# Patient Record
Sex: FEMALE | Race: WHITE | Employment: OTHER | URBAN - METROPOLITAN AREA
[De-identification: names, ages, dates, MRNs, and addresses within clinical notes are randomized per-mention and may not be internally consistent; named-entity substitution may affect disease eponyms.]

---

## 2017-08-15 ENCOUNTER — APPOINTMENT (OUTPATIENT)
Dept: CT IMAGING | Age: 82
End: 2017-08-15
Attending: EMERGENCY MEDICINE
Payer: MEDICARE

## 2017-08-15 ENCOUNTER — HOSPITAL ENCOUNTER (EMERGENCY)
Age: 82
Discharge: HOME | End: 2017-08-15
Attending: EMERGENCY MEDICINE
Payer: MEDICARE

## 2017-08-15 VITALS
RESPIRATION RATE: 16 BRPM | WEIGHT: 144 LBS | DIASTOLIC BLOOD PRESSURE: 61 MMHG | BODY MASS INDEX: 26.5 KG/M2 | HEIGHT: 62 IN | SYSTOLIC BLOOD PRESSURE: 126 MMHG | HEART RATE: 68 BPM | OXYGEN SATURATION: 94 % | TEMPERATURE: 98.9 F

## 2017-08-15 DIAGNOSIS — N30.00 ACUTE CYSTITIS WITHOUT HEMATURIA: Primary | ICD-10-CM

## 2017-08-15 LAB
ALBUMIN SERPL BCP-MCNC: 3.3 G/DL (ref 3.5–5)
ALBUMIN/GLOB SERPL: 0.7 {RATIO} (ref 1.1–2.2)
ALP SERPL-CCNC: 90 U/L (ref 45–117)
ALT SERPL-CCNC: 18 U/L (ref 12–78)
ANION GAP BLD CALC-SCNC: 7 MMOL/L (ref 5–15)
APPEARANCE UR: ABNORMAL
AST SERPL W P-5'-P-CCNC: 16 U/L (ref 15–37)
ATRIAL RATE: 63 BPM
BACTERIA URNS QL MICRO: ABNORMAL /HPF
BASOPHILS # BLD AUTO: 0.1 K/UL (ref 0–0.1)
BASOPHILS # BLD: 1 % (ref 0–1)
BILIRUB SERPL-MCNC: 0.3 MG/DL (ref 0.2–1)
BILIRUB UR QL: NEGATIVE
BUN SERPL-MCNC: 14 MG/DL (ref 6–20)
BUN/CREAT SERPL: 16 (ref 12–20)
CALCIUM SERPL-MCNC: 8.8 MG/DL (ref 8.5–10.1)
CALCULATED P AXIS, ECG09: 53 DEGREES
CALCULATED R AXIS, ECG10: -34 DEGREES
CALCULATED T AXIS, ECG11: -21 DEGREES
CAOX CRY URNS QL MICRO: ABNORMAL
CHLORIDE SERPL-SCNC: 107 MMOL/L (ref 97–108)
CO2 SERPL-SCNC: 26 MMOL/L (ref 21–32)
COLOR UR: ABNORMAL
CREAT SERPL-MCNC: 0.89 MG/DL (ref 0.55–1.02)
DIAGNOSIS, 93000: NORMAL
EOSINOPHIL # BLD: 0.2 K/UL (ref 0–0.4)
EOSINOPHIL NFR BLD: 2 % (ref 0–7)
EPITH CASTS URNS QL MICRO: ABNORMAL /LPF
ERYTHROCYTE [DISTWIDTH] IN BLOOD BY AUTOMATED COUNT: 14.1 % (ref 11.5–14.5)
GLOBULIN SER CALC-MCNC: 4.5 G/DL (ref 2–4)
GLUCOSE SERPL-MCNC: 93 MG/DL (ref 65–100)
GLUCOSE UR STRIP.AUTO-MCNC: NEGATIVE MG/DL
HCT VFR BLD AUTO: 41.4 % (ref 35–47)
HGB BLD-MCNC: 12.9 G/DL (ref 11.5–16)
HGB UR QL STRIP: ABNORMAL
KETONES UR QL STRIP.AUTO: NEGATIVE MG/DL
LACTATE SERPL-SCNC: 0.8 MMOL/L (ref 0.4–2)
LEUKOCYTE ESTERASE UR QL STRIP.AUTO: ABNORMAL
LYMPHOCYTES # BLD AUTO: 34 % (ref 12–49)
LYMPHOCYTES # BLD: 2.5 K/UL (ref 0.8–3.5)
MCH RBC QN AUTO: 29.9 PG (ref 26–34)
MCHC RBC AUTO-ENTMCNC: 31.2 G/DL (ref 30–36.5)
MCV RBC AUTO: 96.1 FL (ref 80–99)
MONOCYTES # BLD: 0.6 K/UL (ref 0–1)
MONOCYTES NFR BLD AUTO: 8 % (ref 5–13)
NEUTS SEG # BLD: 4.1 K/UL (ref 1.8–8)
NEUTS SEG NFR BLD AUTO: 55 % (ref 32–75)
NITRITE UR QL STRIP.AUTO: NEGATIVE
P-R INTERVAL, ECG05: 162 MS
PH UR STRIP: 5.5 [PH] (ref 5–8)
PLATELET # BLD AUTO: 234 K/UL (ref 150–400)
POTASSIUM SERPL-SCNC: 3.9 MMOL/L (ref 3.5–5.1)
PROT SERPL-MCNC: 7.8 G/DL (ref 6.4–8.2)
PROT UR STRIP-MCNC: ABNORMAL MG/DL
Q-T INTERVAL, ECG07: 468 MS
QRS DURATION, ECG06: 68 MS
QTC CALCULATION (BEZET), ECG08: 478 MS
RBC # BLD AUTO: 4.31 M/UL (ref 3.8–5.2)
RBC #/AREA URNS HPF: ABNORMAL /HPF (ref 0–5)
SODIUM SERPL-SCNC: 140 MMOL/L (ref 136–145)
SP GR UR REFRACTOMETRY: 1.03 (ref 1–1.03)
TROPONIN I SERPL-MCNC: <0.04 NG/ML
UROBILINOGEN UR QL STRIP.AUTO: 0.2 EU/DL (ref 0.2–1)
VENTRICULAR RATE, ECG03: 63 BPM
WBC # BLD AUTO: 7.4 K/UL (ref 3.6–11)
WBC URNS QL MICRO: ABNORMAL /HPF (ref 0–4)

## 2017-08-15 PROCEDURE — 74011636320 HC RX REV CODE- 636/320: Performed by: EMERGENCY MEDICINE

## 2017-08-15 PROCEDURE — 74177 CT ABD & PELVIS W/CONTRAST: CPT

## 2017-08-15 PROCEDURE — 80053 COMPREHEN METABOLIC PANEL: CPT | Performed by: EMERGENCY MEDICINE

## 2017-08-15 PROCEDURE — 74011000258 HC RX REV CODE- 258: Performed by: EMERGENCY MEDICINE

## 2017-08-15 PROCEDURE — 83605 ASSAY OF LACTIC ACID: CPT | Performed by: EMERGENCY MEDICINE

## 2017-08-15 PROCEDURE — 84484 ASSAY OF TROPONIN QUANT: CPT | Performed by: EMERGENCY MEDICINE

## 2017-08-15 PROCEDURE — 99285 EMERGENCY DEPT VISIT HI MDM: CPT

## 2017-08-15 PROCEDURE — 85025 COMPLETE CBC W/AUTO DIFF WBC: CPT | Performed by: EMERGENCY MEDICINE

## 2017-08-15 PROCEDURE — 87086 URINE CULTURE/COLONY COUNT: CPT | Performed by: EMERGENCY MEDICINE

## 2017-08-15 PROCEDURE — 96365 THER/PROPH/DIAG IV INF INIT: CPT

## 2017-08-15 PROCEDURE — 93005 ELECTROCARDIOGRAM TRACING: CPT

## 2017-08-15 PROCEDURE — 36415 COLL VENOUS BLD VENIPUNCTURE: CPT | Performed by: EMERGENCY MEDICINE

## 2017-08-15 PROCEDURE — 81001 URINALYSIS AUTO W/SCOPE: CPT | Performed by: EMERGENCY MEDICINE

## 2017-08-15 PROCEDURE — 74011250636 HC RX REV CODE- 250/636: Performed by: EMERGENCY MEDICINE

## 2017-08-15 RX ORDER — ACETAMINOPHEN 325 MG/1
TABLET ORAL
COMMUNITY

## 2017-08-15 RX ORDER — ALBUTEROL SULFATE 90 UG/1
2 AEROSOL, METERED RESPIRATORY (INHALATION)
COMMUNITY
End: 2018-10-04 | Stop reason: SDUPTHER

## 2017-08-15 RX ORDER — CEPHALEXIN 500 MG/1
500 CAPSULE ORAL 2 TIMES DAILY
Qty: 10 CAP | Refills: 0 | OUTPATIENT
Start: 2017-08-15 | End: 2020-04-30

## 2017-08-15 RX ORDER — CHOLECALCIFEROL (VITAMIN D3) 125 MCG
1 CAPSULE ORAL DAILY
COMMUNITY
End: 2021-10-20

## 2017-08-15 RX ORDER — ESCITALOPRAM OXALATE 10 MG/1
10 TABLET ORAL DAILY
COMMUNITY
End: 2018-02-10 | Stop reason: SDUPTHER

## 2017-08-15 RX ORDER — SODIUM CHLORIDE 0.9 % (FLUSH) 0.9 %
10 SYRINGE (ML) INJECTION
Status: COMPLETED | OUTPATIENT
Start: 2017-08-15 | End: 2017-08-15

## 2017-08-15 RX ADMIN — Medication 10 ML: at 15:41

## 2017-08-15 RX ADMIN — CEFTRIAXONE 1 G: 1 INJECTION, POWDER, FOR SOLUTION INTRAMUSCULAR; INTRAVENOUS at 14:37

## 2017-08-15 RX ADMIN — SODIUM CHLORIDE 100 ML: 900 INJECTION, SOLUTION INTRAVENOUS at 15:41

## 2017-08-15 RX ADMIN — IOPAMIDOL 100 ML: 755 INJECTION, SOLUTION INTRAVENOUS at 15:41

## 2017-08-15 NOTE — DISCHARGE INSTRUCTIONS
Urinary Tract Infection in Women: Care Instructions  Your Care Instructions    A urinary tract infection, or UTI, is a general term for an infection anywhere between the kidneys and the urethra (where urine comes out). Most UTIs are bladder infections. They often cause pain or burning when you urinate. UTIs are caused by bacteria and can be cured with antibiotics. Be sure to complete your treatment so that the infection goes away. Follow-up care is a key part of your treatment and safety. Be sure to make and go to all appointments, and call your doctor if you are having problems. It's also a good idea to know your test results and keep a list of the medicines you take. How can you care for yourself at home? · Take your antibiotics as directed. Do not stop taking them just because you feel better. You need to take the full course of antibiotics. · Drink extra water and other fluids for the next day or two. This may help wash out the bacteria that are causing the infection. (If you have kidney, heart, or liver disease and have to limit fluids, talk with your doctor before you increase your fluid intake.)  · Avoid drinks that are carbonated or have caffeine. They can irritate the bladder. · Urinate often. Try to empty your bladder each time. · To relieve pain, take a hot bath or lay a heating pad set on low over your lower belly or genital area. Never go to sleep with a heating pad in place. To prevent UTIs  · Drink plenty of water each day. This helps you urinate often, which clears bacteria from your system. (If you have kidney, heart, or liver disease and have to limit fluids, talk with your doctor before you increase your fluid intake.)  · Urinate when you need to. · Urinate right after you have sex. · Change sanitary pads often. · Avoid douches, bubble baths, feminine hygiene sprays, and other feminine hygiene products that have deodorants.   · After going to the bathroom, wipe from front to back.  When should you call for help? Call your doctor now or seek immediate medical care if:  · Symptoms such as fever, chills, nausea, or vomiting get worse or appear for the first time. · You have new pain in your back just below your rib cage. This is called flank pain. · There is new blood or pus in your urine. · You have any problems with your antibiotic medicine. Watch closely for changes in your health, and be sure to contact your doctor if:  · You are not getting better after taking an antibiotic for 2 days. · Your symptoms go away but then come back. Where can you learn more? Go to http://hever-radhames.info/. Enter K885 in the search box to learn more about \"Urinary Tract Infection in Women: Care Instructions. \"  Current as of: November 28, 2016  Content Version: 11.3  © 2484-9011 [employer]. Care instructions adapted under license by [employer] (which disclaims liability or warranty for this information). If you have questions about a medical condition or this instruction, always ask your healthcare professional. [address] any warranty or liability for your use of this information. We hope that we have addressed all of your medical concerns. The examination and treatment you received in the Emergency Department were for an emergent problem and were not intended as complete care. It is important that you follow up with your healthcare provider(s) for ongoing care. If your symptoms worsen or do not improve as expected, and you are unable to reach your usual health care provider(s), you should return to the Emergency Department. Today's healthcare is undergoing tremendous change, and patient satisfaction surveys are one of the many tools to assess the quality of medical care. You may receive a survey from the [employer] organization regarding your experience in the Emergency Department.   I hope that your experience has been completely positive, particularly the medical care that I provided. As such, please participate in the survey; anything less than excellent does not meet my expectations or intentions. [facility] and [employer] participate in nationally recognized quality of care measures. If your blood pressure is greater than 120/80, as reported below, we urge that you seek medical care to address the potential of high blood pressure, commonly known as hypertension. Hypertension can be hereditary or can be caused by certain medical conditions, pain, stress, or \"white coat syndrome. \"       Please make an appointment with your health care provider(s) for follow up of your Emergency Department visit. VITALS:   Patient Vitals for the past 8 hrs:   Temp Pulse Resp BP SpO2   08/15/17 1500 - - - 134/45 93 %   08/15/17 1430 - - - 130/73 93 %   08/15/17 1400 - - - 159/44 95 %   08/15/17 1330 - - - 138/53 95 %   08/15/17 1303 98.9 °F (37.2 °C) 68 16 171/65 97 %          Thank you for allowing us to provide you with medical care today. We realize that you have many choices for your emergency care needs. Please choose us in the future for any continued health care needs. [place]  [address], [address] [facility].   Office: [phone]            Recent Results (from the past 24 hour(s))   EKG, 12 LEAD, INITIAL    Collection Time: 08/15/17  1:12 PM   Result Value Ref Range    Ventricular Rate 63 BPM    Atrial Rate 63 BPM    P-R Interval 162 ms    QRS Duration 68 ms    Q-T Interval 468 ms    QTC Calculation (Bezet) 478 ms    Calculated P Axis 53 degrees    Calculated R Axis -34 degrees    Calculated T Axis -21 degrees    Diagnosis       Normal sinus rhythm  Left axis deviation  Nonspecific ST and T wave abnormality  When compared with ECG of 25-DEC-2015 15:57,  Criteria for Septal infarct are no longer present     CBC WITH AUTOMATED DIFF    Collection Time: 08/15/17  1:16 PM   Result Value Ref Range    WBC 7.4 3.6 - 11.0 K/uL    RBC 4.31 3.80 - 5.20 M/uL    HGB 12.9 11.5 - 16.0 g/dL    HCT 41.4 35.0 - 47.0 %    MCV 96.1 80.0 - 99.0 FL    MCH 29.9 26.0 - 34.0 PG    MCHC 31.2 30.0 - 36.5 g/dL    RDW 14.1 11.5 - 14.5 %    PLATELET 385 272 - 733 K/uL    NEUTROPHILS 55 32 - 75 %    LYMPHOCYTES 34 12 - 49 %    MONOCYTES 8 5 - 13 %    EOSINOPHILS 2 0 - 7 %    BASOPHILS 1 0 - 1 %    ABS. NEUTROPHILS 4.1 1.8 - 8.0 K/UL    ABS. LYMPHOCYTES 2.5 0.8 - 3.5 K/UL    ABS. MONOCYTES 0.6 0.0 - 1.0 K/UL    ABS. EOSINOPHILS 0.2 0.0 - 0.4 K/UL    ABS. BASOPHILS 0.1 0.0 - 0.1 K/UL   METABOLIC PANEL, COMPREHENSIVE    Collection Time: 08/15/17  1:16 PM   Result Value Ref Range    Sodium 140 136 - 145 mmol/L    Potassium 3.9 3.5 - 5.1 mmol/L    Chloride 107 97 - 108 mmol/L    CO2 26 21 - 32 mmol/L    Anion gap 7 5 - 15 mmol/L    Glucose 93 65 - 100 mg/dL    BUN 14 6 - 20 MG/DL    Creatinine 0.89 0.55 - 1.02 MG/DL    BUN/Creatinine ratio 16 12 - 20      GFR est AA >60 >60 ml/min/1.73m2    GFR est non-AA >60 >60 ml/min/1.73m2    Calcium 8.8 8.5 - 10.1 MG/DL    Bilirubin, total 0.3 0.2 - 1.0 MG/DL    ALT (SGPT) 18 12 - 78 U/L    AST (SGOT) 16 15 - 37 U/L    Alk.  phosphatase 90 45 - 117 U/L    Protein, total 7.8 6.4 - 8.2 g/dL    Albumin 3.3 (L) 3.5 - 5.0 g/dL    Globulin 4.5 (H) 2.0 - 4.0 g/dL    A-G Ratio 0.7 (L) 1.1 - 2.2     URINALYSIS W/ RFLX MICROSCOPIC    Collection Time: 08/15/17  1:16 PM   Result Value Ref Range    Color YELLOW/STRAW      Appearance CLOUDY (A) CLEAR      Specific gravity 1.026 1.003 - 1.030      pH (UA) 5.5 5.0 - 8.0      Protein TRACE (A) NEG mg/dL    Glucose NEGATIVE  NEG mg/dL    Ketone NEGATIVE  NEG mg/dL    Bilirubin NEGATIVE  NEG      Blood SMALL (A) NEG      Urobilinogen 0.2 0.2 - 1.0 EU/dL    Nitrites NEGATIVE  NEG      Leukocyte Esterase MODERATE (A) NEG      WBC 0-4 0 - 4 /hpf    RBC 0-5 0 - 5 /hpf    Epithelial cells FEW FEW /lpf    Bacteria 1+ (A) NEG /hpf    CA Oxalate crystals 1+ (A) NEG   TROPONIN I    Collection Time: 08/15/17  1:16 PM   Result Value Ref Range    Troponin-I, Qt. <0.04 <0.05 ng/mL   LACTIC ACID    Collection Time: 08/15/17  1:32 PM   Result Value Ref Range    Lactic acid 0.8 0.4 - 2.0 MMOL/L       Ct Abd Pelv W Cont    Result Date: 8/15/2017  EXAM:  CT ABD PELV W CONT Clinical history: Abdominal pain and diarrhea INDICATION: abd pain diarrhea COMPARISON: 1/27/2014 CONTRAST:  100 mL of Isovue-370. TECHNIQUE: Following the uneventful intravenous administration of contrast, thin axial images were obtained through the abdomen and pelvis. Coronal and sagittal reconstructions were generated. Oral contrast was not administered. CT dose reduction was achieved through use of a standardized protocol tailored for this examination and automatic exposure control for dose modulation. FINDINGS: LUNG BASES: Dependent changes. Coronary artery disease. INCIDENTALLY IMAGED HEART AND MEDIASTINUM: No hiatal hernia. LIVER: Portal vein is patent. Tiny hepatic hypodensity left hepatic lobe most likely simple cyst. GALLBLADDER: Unremarkable. SPLEEN: Splenic hypodensity likely benign slightly increased in size compared to 2014 PANCREAS: No mass or ductal dilatation. ADRENALS: Fullness of the left adrenal gland unchanged KIDNEYS: Left renal and right renal hypodensities with renal calculi that are nonobstructive. No hydronephrosis. No hydroureter. Renal cortical thinning bilaterally. Not significantly changed STOMACH: Unremarkable. SMALL BOWEL: No dilatation or wall thickening. COLON: Fecal stasis. APPENDIX: Within normal limits. PERITONEUM: No ascites or pneumoperitoneum. RETROPERITONEUM: Atherosclerotic change. Tortuosity of the aorta. REPRODUCTIVE ORGANS: Hysterectomy. URINARY BLADDER: No mass or calculus. BONES: There is scoliotic change. Spondylolisthesis. Compression deformities at T10, T11, T12 chronic in nature.  ADDITIONAL COMMENTS: N/A     IMPRESSION: No obstruction or free air. No acute intraperitoneal process. Renal hepatic cyst. Nonobstructive renal calculi.

## 2017-08-15 NOTE — ED PROVIDER NOTES
HPI Comments: 80 y.o. female with past medical history significant for dementia, arthritis, HTN, osteoporosis, diverticulosis, alcoholism who presents from home via EMS with chief complaint of altered mental state. Per pt's caregiver, pt began having increased weakness, vomiting and diarrhea starting four days ago. Since then, she has been acting more confused than her baseline. Pt took imodium for her diarrhea, which has not helped. Pt's weakness is described as her legs \"giving out\" and the pt needing to be placed in a wheelchair instead of walking as usual. Per caregiver, pt also has \"fluid in her chest,\" which causes her to have SOB and wheezing, for which she uses an inhaler that offers only brief relief. Pt has a hx of strokes and UTIs. Caregiver denies that the pt has been on ABx recently and the pt has no h/o C. Diff infection. Pt denies any fever. There are no other acute medical concerns at this time. Social hx: former smoker, +EtOH use    PCP: Leandra Romero MD    Full history, physical exam, and ROS unable to be obtained due to:  Dementia and increased confusion. Note written by Twila Moreno, as dictated by Gabriel Duke MD 1:14 PM     The history is provided by a caregiver. History limited by:  Dementia and increased confusion. No  was used.         Past Medical History:   Diagnosis Date    Alcoholism ([facility] 11/10/2011    Arthritis     Dementia 11/10/2011    DEMENTIA     Depression 11/10/2011    Diverticulosis 1/27/2014    HTN (hypertension) 6/18/2012    Hyperlipidemia 6/24/2012    Hypertension     Hypovitaminosis D 12/17/2013    Intraventricular hemorrhage, nontraumatic ([facility] 6/18/2012    Mass of pancreas 1/27/2014    1.5 CM DENSITY POST HEAD OF PANCREAS ON CT 1/27/2014    Osteoporosis 12/16/2013    Other ill-defined conditions     broken right shoulder    Stroke [facility])        Past Surgical History:   Procedure Laterality Date    HX APPENDECTOMY  HX CATARACT REMOVAL      HX CHOLECYSTECTOMY      HX HYSTERECTOMY      HX ORTHOPAEDIC      repair lt shoulder fx         History reviewed. No pertinent family history. Social History     Social History    Marital status:      Spouse name: N/A    Number of children: N/A    Years of education: N/A     Occupational History    Not on file. Social History Main Topics    Smoking status: Former Smoker     Packs/day: 1.00     Years: 50.00    Smokeless tobacco: Not on file    Alcohol use Yes      Comment: dtrs state amount unknown    Drug use: No    Sexual activity: Not on file     Other Topics Concern    Not on file     Social History Narrative    ** Merged History Encounter **              ALLERGIES: Codeine and Ibuprofen    Review of Systems   Unable to perform ROS: Dementia       Vitals:    08/15/17 1303   BP: 171/65   Pulse: 68   Resp: 16   Temp: 98.9 °F (37.2 °C)   SpO2: 97%   Weight: 65.3 kg (144 lb)   Height: 5' 2\" (1.575 m)            Physical Exam   Constitutional: She appears well-developed and well-nourished. No distress. HENT:   Head: Normocephalic and atraumatic. Eyes: Pupils are equal, round, and reactive to light. Neck: Normal range of motion. Neck supple. Cardiovascular: Normal rate, regular rhythm and normal heart sounds. Exam reveals no gallop and no friction rub. No murmur heard. Pulmonary/Chest: Effort normal and breath sounds normal. No respiratory distress. She has no wheezes. Normal breath sounds. Abdominal: Soft. Bowel sounds are normal. She exhibits no distension. There is no rebound and no guarding. Soft, nontender abdomen. Musculoskeletal: Normal range of motion. No pedal edema. Neurological: She is alert. Awake & oriented to place and self. Not oriented to time. Skin: Skin is warm. No rash noted. She is not diaphoretic. Psychiatric: She has a normal mood and affect.  Her behavior is normal. Judgment and thought content normal. Nursing note and vitals reviewed. Note written by Twila Stringer, as dictated by Janis Campbell MD 1:49 PM      [facility]  ED Course   This is a 80-year-old female past medical history of arthritis, dementia, CVA, hypertension, presenting with complaints of abdominal pain, diarrhea and decreased mentation. Caregiver says the patient is not acting per her baseline, states more lethargic than usual, with persistent watery diarrhea. She denies fevers, chills, nausea, vomiting, patient denies dysuria. Patient noted to be awake, alert, oriented to place but not time or situation on arrival. Physical exam remarkable for elderly, not acutely ill appearing individual in no acute distress with a soft nontender abdomen, clear breath sounds auscultation, not tachycardic, with regular rate and rhythm. Presentation is concerning for infectious diarrhea, dehydration, versus other source of infection, versus ongoing dementia. Plan to obtain CMP, CBC, UA, stool studies, and head CT. We will make a disposition based on the patient's diagnostics response to therapy. Procedures    ED EKG interpretation:  Rhythm: normal sinus rhythm; Rate (approx.): 63; ST/T wave: biphasic T wave in v3, v4, and v5. Note written by Twila Stringer, as dictated by Janis Campbell MD 1:45 PM      4:36 PM  Patient with UTI by labwork, no further vomiting or diarrhea, has received 1g cephalexin IV. Will DC home with the same, return precautions, PCP follow up. Stool sent for cDiff culture.

## 2017-08-15 NOTE — ED NOTES
Patient discharged with vital signs stable, AVS and prescriptions, in no acute distress.     [facility] eta 1 hour

## 2017-08-15 NOTE — ED NOTES
Bedside shift change report given to [address] (oncoming nurse) by Adal Bal RN (offgoing nurse). Report included the following information SBAR, ED Summary, MAR and Recent Results.

## 2017-08-15 NOTE — ED TRIAGE NOTES
Patient arrives via EMS from home for generalized weakness over the last four days. Caregiver reports patient has not wanted to get out of bed as much as usual. Denies shortness of breath or chest pain. Patient arrives alert and oriented to person. Patient has hx of dementia and stoke. BG 94 en route.

## 2017-08-16 NOTE — CALL BACK NOTE
[facility] Emergency Department Follow Up Call Record    Discharged to : Home/Family Home/Home Health/Skilled Facility/Rehab/Assisted Living/Other_ Home______  1) Did you receive your discharge instructions? Yes S poke with Otf Mackenzie, caregiver who reports she had received the discharge instructions and understood instructions. Laurita Mora has started the Keflex for cystitis. Caregiver reports Mrs. Hayden feeling better today. No further questions. Caregiver has been in touch with PCP. 2) Do you understand them? Yes         3) Are you able to follow them? Yes          If NO, what can I clarify for you? 4) Do you understand your diagnosis? Yes         5) Do you know which symptoms should prompt you to call the doctor? Yes     6) Were you able to fill and  any medications that were prescribed? Yes     7) You were prescribed ___________for ____________________. Common side effects of this medication are____________________. This is not a complete list so please review the forms given from the pharmacy for a complete list.      8) Are there any questions about your medications? No            Have you scheduled any recommended doctors appointments (specialty, PCP) NO. Dr. Romi Duncan has not made further apt. Date for this patient. If NO, what barriers are you encountering (transportation/lost contact info/cost/  didnt think necessary/no PCP  9) If discharged with Home Health, has the agency contacted you to schedule visit? N/A   Receives care from personal caregiver at Mrs. Hayden's home. 10) Is there anyone available to help you at home (meals, errands, transportation    monitoring) (adult children, neighbors, private duty companions) Yes    11) Are you on a special diet? No         If YES, do you understand the requirements for this diet? Education provided?   12) If presented with cough, bronchitis, COPD, asthma, is it ok to ask that the   respiratory disease management educator call you? Not applicable      13)  A) If presented with fall, were you issued an assistive device in the ED    Are you using? Not applicable  B) If given RX for device, have you obtained? Not applicable       If NO, barriers? C) Therapist recommended:N/A   Are you able to implement the suggestions? Not applicable        If NO, barriers to implementation? D) Are you having any difficulties with mobility inside your home?     (steps, bed, tub)Yes  Chronic mobility issues. Requires assistance with ambulation   If YES, ask if the [facility] PT can contact patient and good time and number?  14)  At the end of your discharge instructions, there is information about accessing [facility], have you had a chance to review those? No         Do you have any questions about signing up for this service? We encourage our patients to be active participants in their healthcare and this site is one of the ways to do that. It will allow you to access parts of your medical record, email your doctors office, schedule appointments, and request medications refills . 15) Are there any other questions that I can answer for you regarding    your Emergency department visit?  NO             Estimated Call Time:__3:42 PM  _________________ Date/Time:_______________

## 2017-08-17 LAB
BACTERIA SPEC CULT: NORMAL
CC UR VC: NORMAL
SERVICE CMNT-IMP: NORMAL

## 2018-11-16 ENCOUNTER — HOSPITAL ENCOUNTER (EMERGENCY)
Age: 83
Discharge: HOME | End: 2018-11-17
Attending: EMERGENCY MEDICINE
Payer: MEDICARE

## 2018-11-16 ENCOUNTER — APPOINTMENT (OUTPATIENT)
Dept: CT IMAGING | Age: 83
End: 2018-11-16
Attending: EMERGENCY MEDICINE
Payer: MEDICARE

## 2018-11-16 ENCOUNTER — APPOINTMENT (OUTPATIENT)
Dept: GENERAL RADIOLOGY | Age: 83
End: 2018-11-16
Attending: EMERGENCY MEDICINE
Payer: MEDICARE

## 2018-11-16 DIAGNOSIS — R06.89 DIFFICULTY BREATHING: Primary | ICD-10-CM

## 2018-11-16 LAB
ALBUMIN SERPL-MCNC: 3.7 G/DL (ref 3.5–5)
ALBUMIN/GLOB SERPL: 0.7 {RATIO} (ref 1.1–2.2)
ALP SERPL-CCNC: 99 U/L (ref 45–117)
ALT SERPL-CCNC: 25 U/L (ref 12–78)
ANION GAP SERPL CALC-SCNC: 11 MMOL/L (ref 5–15)
AST SERPL-CCNC: 21 U/L (ref 15–37)
BASOPHILS # BLD: 0.1 K/UL (ref 0–0.1)
BASOPHILS NFR BLD: 1 % (ref 0–1)
BILIRUB SERPL-MCNC: 0.3 MG/DL (ref 0.2–1)
BNP SERPL-MCNC: 654 PG/ML (ref 0–450)
BUN SERPL-MCNC: 14 MG/DL (ref 6–20)
BUN/CREAT SERPL: 15 (ref 12–20)
CALCIUM SERPL-MCNC: 9.4 MG/DL (ref 8.5–10.1)
CHLORIDE SERPL-SCNC: 105 MMOL/L (ref 97–108)
CO2 SERPL-SCNC: 23 MMOL/L (ref 21–32)
CREAT SERPL-MCNC: 0.96 MG/DL (ref 0.55–1.02)
DIFFERENTIAL METHOD BLD: ABNORMAL
EOSINOPHIL # BLD: 0.2 K/UL (ref 0–0.4)
EOSINOPHIL NFR BLD: 2 % (ref 0–7)
ERYTHROCYTE [DISTWIDTH] IN BLOOD BY AUTOMATED COUNT: 13.3 % (ref 11.5–14.5)
GLOBULIN SER CALC-MCNC: 5.2 G/DL (ref 2–4)
GLUCOSE SERPL-MCNC: 119 MG/DL (ref 65–100)
HCT VFR BLD AUTO: 46.3 % (ref 35–47)
HGB BLD-MCNC: 14.5 G/DL (ref 11.5–16)
IMM GRANULOCYTES # BLD: 0.1 K/UL (ref 0–0.04)
IMM GRANULOCYTES NFR BLD AUTO: 1 % (ref 0–0.5)
LYMPHOCYTES # BLD: 3.1 K/UL (ref 0.8–3.5)
LYMPHOCYTES NFR BLD: 33 % (ref 12–49)
MCH RBC QN AUTO: 30.5 PG (ref 26–34)
MCHC RBC AUTO-ENTMCNC: 31.3 G/DL (ref 30–36.5)
MCV RBC AUTO: 97.5 FL (ref 80–99)
MONOCYTES # BLD: 0.6 K/UL (ref 0–1)
MONOCYTES NFR BLD: 6 % (ref 5–13)
NEUTS SEG # BLD: 5.3 K/UL (ref 1.8–8)
NEUTS SEG NFR BLD: 57 % (ref 32–75)
NRBC # BLD: 0 K/UL (ref 0–0.01)
NRBC BLD-RTO: 0 PER 100 WBC
PLATELET # BLD AUTO: 250 K/UL (ref 150–400)
PMV BLD AUTO: 10 FL (ref 8.9–12.9)
POTASSIUM SERPL-SCNC: 3.7 MMOL/L (ref 3.5–5.1)
PROT SERPL-MCNC: 8.9 G/DL (ref 6.4–8.2)
RBC # BLD AUTO: 4.75 M/UL (ref 3.8–5.2)
SODIUM SERPL-SCNC: 139 MMOL/L (ref 136–145)
TROPONIN I SERPL-MCNC: <0.05 NG/ML
WBC # BLD AUTO: 9.3 K/UL (ref 3.6–11)

## 2018-11-16 PROCEDURE — 74011000258 HC RX REV CODE- 258: Performed by: RADIOLOGY

## 2018-11-16 PROCEDURE — 71275 CT ANGIOGRAPHY CHEST: CPT

## 2018-11-16 PROCEDURE — 77030029684 HC NEB SM VOL KT MONA -A

## 2018-11-16 PROCEDURE — 71045 X-RAY EXAM CHEST 1 VIEW: CPT

## 2018-11-16 PROCEDURE — 36415 COLL VENOUS BLD VENIPUNCTURE: CPT

## 2018-11-16 PROCEDURE — 93005 ELECTROCARDIOGRAM TRACING: CPT

## 2018-11-16 PROCEDURE — 84484 ASSAY OF TROPONIN QUANT: CPT

## 2018-11-16 PROCEDURE — 80053 COMPREHEN METABOLIC PANEL: CPT

## 2018-11-16 PROCEDURE — 74011636320 HC RX REV CODE- 636/320: Performed by: RADIOLOGY

## 2018-11-16 PROCEDURE — 94640 AIRWAY INHALATION TREATMENT: CPT

## 2018-11-16 PROCEDURE — 99285 EMERGENCY DEPT VISIT HI MDM: CPT

## 2018-11-16 PROCEDURE — 83880 ASSAY OF NATRIURETIC PEPTIDE: CPT

## 2018-11-16 PROCEDURE — 94664 DEMO&/EVAL PT USE INHALER: CPT

## 2018-11-16 PROCEDURE — 74011000250 HC RX REV CODE- 250: Performed by: EMERGENCY MEDICINE

## 2018-11-16 PROCEDURE — 85025 COMPLETE CBC W/AUTO DIFF WBC: CPT

## 2018-11-16 RX ORDER — SODIUM CHLORIDE 0.9 % (FLUSH) 0.9 %
10 SYRINGE (ML) INJECTION
Status: COMPLETED | OUTPATIENT
Start: 2018-11-16 | End: 2018-11-16

## 2018-11-16 RX ORDER — IPRATROPIUM BROMIDE AND ALBUTEROL SULFATE 2.5; .5 MG/3ML; MG/3ML
3 SOLUTION RESPIRATORY (INHALATION)
Status: COMPLETED | OUTPATIENT
Start: 2018-11-16 | End: 2018-11-16

## 2018-11-16 RX ORDER — ONDANSETRON 2 MG/ML
4 INJECTION INTRAMUSCULAR; INTRAVENOUS
Status: DISCONTINUED | OUTPATIENT
Start: 2018-11-16 | End: 2018-11-17 | Stop reason: HOSPADM

## 2018-11-16 RX ADMIN — IPRATROPIUM BROMIDE AND ALBUTEROL SULFATE 3 ML: .5; 3 SOLUTION RESPIRATORY (INHALATION) at 22:09

## 2018-11-16 RX ADMIN — Medication 10 ML: at 23:12

## 2018-11-16 RX ADMIN — SODIUM CHLORIDE 100 ML: 900 INJECTION, SOLUTION INTRAVENOUS at 23:12

## 2018-11-16 RX ADMIN — IOPAMIDOL 80 ML: 755 INJECTION, SOLUTION INTRAVENOUS at 23:12

## 2018-11-17 VITALS
HEART RATE: 84 BPM | DIASTOLIC BLOOD PRESSURE: 50 MMHG | TEMPERATURE: 98.2 F | SYSTOLIC BLOOD PRESSURE: 138 MMHG | RESPIRATION RATE: 15 BRPM | OXYGEN SATURATION: 94 %

## 2018-11-17 LAB
ATRIAL RATE: 106 BPM
CALCULATED P AXIS, ECG09: 97 DEGREES
CALCULATED R AXIS, ECG10: -31 DEGREES
CALCULATED T AXIS, ECG11: 90 DEGREES
DIAGNOSIS, 93000: NORMAL
P-R INTERVAL, ECG05: 152 MS
Q-T INTERVAL, ECG07: 344 MS
QRS DURATION, ECG06: 60 MS
QTC CALCULATION (BEZET), ECG08: 456 MS
VENTRICULAR RATE, ECG03: 106 BPM

## 2018-11-17 NOTE — ED NOTES
MD reviewed discharge instructions and options with patient and patient verbalized understanding. RN reviewed discharge instructions using teachback method. Pt ambulated to exit without difficulty and in no signs of acute distress escorted by care takeer, and she  will drive home. No complaints or needs expressed at this time. Patient was counseled on medications prescribed at discharge. VSS, verbalized relief from most intense pain. Patient to call Dr. Wallace Tolbert in the morning for appointment.

## 2018-11-17 NOTE — ED PROVIDER NOTES
HPI  
 
  80y F with hx of dementia, HTN, prior CVA here with trouble breathing. About 2 hours prior to arrival started to have vomiting and was bringing up with looked like clear phlegm. Had trouble breathing. No prior hx of breathing problems but had an identical episode about 3 years ago that resolved on its own (did come to the ED at that time). Prior to onset had been in her usual state of health. Was not complaining of any pain. No fever. No diarrhea. No recent illnesses. All history is from family and caregiver. Pt does not provide any details. Past Medical History:  
Diagnosis Date  Alcoholism ([facility] [address] 11/10/2011  Arthritis  Dementia 11/10/2011  Dementia  Depression 11/10/2011  Diverticulosis 1/27/2014  
 HTN (hypertension) 6/18/2012  Hyperlipidemia 6/24/2012  Hypertension  Hypovitaminosis D 12/17/2013  Intraventricular hemorrhage, nontraumatic ([facility] [address] 6/18/2012  Mass of pancreas 1/27/2014  
 1.5 CM DENSITY POST HEAD OF PANCREAS ON CT 1/27/2014  Osteoporosis 12/16/2013  Other ill-defined conditions   
 broken right shoulder  Stroke ([facility] [address] Past Surgical History:  
Procedure Laterality Date  HX APPENDECTOMY  HX CATARACT REMOVAL    
 HX CHOLECYSTECTOMY  HX HYSTERECTOMY  HX ORTHOPAEDIC    
 repair lt shoulder fx No family history on file. Social History Socioeconomic History  Marital status:  Spouse name: Not on file  Number of children: Not on file  Years of education: Not on file  Highest education level: Not on file Social Needs  Financial resource strain: Not on file  Food insecurity - worry: Not on file  Food insecurity - inability: Not on file  Transportation needs - medical: Not on file  Transportation needs - non-medical: Not on file Occupational History  Not on file Tobacco Use  Smoking status: Former Smoker Packs/day: 1.00 Years: 50.00 Pack years: 50.00 Substance and Sexual Activity  Alcohol use: Yes Comment: dtrs state amount unknown  Drug use: No  
 Sexual activity: Not on file Other Topics Concern  Not on file Social History Narrative ** Merged History Encounter ** ALLERGIES: Codeine; Ibuprofen; and Pcn [penicillins] Review of Systems Review of Systems Constitutional: (-) weight loss. HEENT: (-) stiff neck Eyes: (-) discharge. Respiratory: (+) cough. Cardiovascular: (-) syncope. Gastrointestinal: (-) blood in stool. Genitourinary: (-) hematuria. Musculoskeletal: (-) myalgias. Neurological: (-) seizure. Skin: (-) petechiae Lymph/Immunologic: (-) enlarged lymph nodes All other systems reviewed and are negative. Vitals:  
 11/16/18 2028 11/16/18 2042 BP:  (!) 226/89 Pulse: (!) 118 (!) 116 Resp:  (!) 31 Temp:  98.4 °F (36.9 °C) SpO2: 90% Physical Exam Nursing note and vitals reviewed. Constitutional: oriented to person, place, and time. appears well-developed and well-nourished. Mod distress. Head: Normocephalic and atraumatic. Sclera anicteric Nose: No rhinorrhea Mouth/Throat: Oropharynx is clear and moist. Pharynx normal 
Eyes: Conjunctivae are normal. Pupils are equal, round, and reactive to light. Right eye exhibits no discharge. Left eye exhibits no discharge. Neck: Painless normal range of motion. Neck supple. No LAD. Cardiovascular: Normal rate, regular rhythm, normal heart sounds and intact distal pulses. Exam reveals no gallop and no friction rub. No murmur heard. Pulmonary/Chest:  Mod respiratory distress. Slight increased work of breathing. No accessory muscle use. Fair air exchange throughout. Coarse sounds throughout with audible gurgling from her mouth. Abdominal: soft, non-tender, no rebound or guarding. No hepatosplenomegaly. Normal bowel sounds throughout. Back: no tenderness to palpation, no deformities, no CVA tenderness Extremities/Musculoskeletal: Normal range of motion. no tenderness. No edema. Distal extremities are neurovasc intact. Lymphadenopathy:   No adenopathy. Neurological:  Alert and oriented to person, place, and time. Coordination normal. CN 2-12 intact. Motor and sensory function intact. Skin: Skin is warm and dry. No rash noted. No pallor. MDM 80y F here with trouble breathing. Has lots of phlegm that was suctioned out and now wheezing. Will check labs, ECG, CXR, and try a duoneb. Procedures ED EKG interpretation: 
Rhythm: sinus tachycardia; and regular . Rate (approx.): 106; Axis: left axis deviation; P wave: normal; QRS interval: normal ; ST/T wave: normal;  This EKG was interpreted by Cj Rausch MD,ED Provider. 11:53 PM 
Breathing is back to normal. CTA looks ok. Labs reassuring. Seems like this was all mucous related that then maybe caused some bronchospasm. No longer tachypneic. Speaking in full sentences. Return precautions discussed.

## 2018-11-17 NOTE — ED TRIAGE NOTES
Triage: Pt arrives from home with CC of SOB and respiratory distress. Per family pt the SOB began around 7 when the pt started coughing up \"thick mucus\". Per family pt does not have history of COPD or CHF. Pt with audible rales during triage.

## 2018-11-17 NOTE — DISCHARGE INSTRUCTIONS
Cough: Care Instructions  Your Care Instructions    A cough is your body's response to something that bothers your throat or airways. Many things can cause a cough. You might cough because of a cold or the flu, bronchitis, or asthma. Smoking, postnasal drip, allergies, and stomach acid that backs up into your throat also can cause coughs. A cough is a symptom, not a disease. Most coughs stop when the cause, such as a cold, goes away. You can take a few steps at home to cough less and feel better. Follow-up care is a key part of your treatment and safety. Be sure to make and go to all appointments, and call your doctor if you are having problems. It's also a good idea to know your test results and keep a list of the medicines you take. How can you care for yourself at home? · Drink lots of water and other fluids. This helps thin the mucus and soothes a dry or sore throat. Honey or lemon juice in hot water or tea may ease a dry cough. · Take cough medicine as directed by your doctor. · Prop up your head on pillows to help you breathe and ease a dry cough. · Try cough drops to soothe a dry or sore throat. Cough drops don't stop a cough. Medicine-flavored cough drops are no better than candy-flavored drops or hard candy. · Do not smoke. Avoid secondhand smoke. If you need help quitting, talk to your doctor about stop-smoking programs and medicines. These can increase your chances of quitting for good. When should you call for help? Call 911 anytime you think you may need emergency care.  For example, call if:    · You have severe trouble breathing.    Call your doctor now or seek immediate medical care if:    · You cough up blood.     · You have new or worse trouble breathing.     · You have a new or higher fever.     · You have a new rash.    Watch closely for changes in your health, and be sure to contact your doctor if:    · You cough more deeply or more often, especially if you notice more mucus or a change in the color of your mucus.     · You have new symptoms, such as a sore throat, an earache, or sinus pain.     · You do not get better as expected. Where can you learn more? Go to http://hever-radhames.info/. Enter D279 in the search box to learn more about \"Cough: Care Instructions. \"  Current as of: December 6, 2017  Content Version: 11.8  © 1306-4223 [employer]. Care instructions adapted under license by [employer] (which disclaims liability or warranty for this information). If you have questions about a medical condition or this instruction, always ask your healthcare professional. [address] any warranty or liability for your use of this information.

## 2019-05-02 ENCOUNTER — APPOINTMENT (OUTPATIENT)
Dept: GENERAL RADIOLOGY | Age: 84
End: 2019-05-02
Attending: EMERGENCY MEDICINE
Payer: MEDICARE

## 2019-05-02 ENCOUNTER — APPOINTMENT (OUTPATIENT)
Dept: CT IMAGING | Age: 84
End: 2019-05-02
Attending: EMERGENCY MEDICINE
Payer: MEDICARE

## 2019-05-02 ENCOUNTER — HOSPITAL ENCOUNTER (EMERGENCY)
Age: 84
Discharge: HOME | End: 2019-05-03
Attending: EMERGENCY MEDICINE | Admitting: EMERGENCY MEDICINE
Payer: MEDICARE

## 2019-05-02 DIAGNOSIS — R05.9 COUGH: Primary | ICD-10-CM

## 2019-05-02 DIAGNOSIS — N30.00 ACUTE CYSTITIS WITHOUT HEMATURIA: ICD-10-CM

## 2019-05-02 LAB
APPEARANCE UR: ABNORMAL
BACTERIA URNS QL MICRO: NEGATIVE /HPF
BASOPHILS # BLD: 0.1 K/UL (ref 0–0.1)
BASOPHILS NFR BLD: 1 % (ref 0–1)
BILIRUB UR QL: NEGATIVE
CAOX CRY URNS QL MICRO: ABNORMAL
COLOR UR: ABNORMAL
COMMENT, HOLDF: NORMAL
COMMENT, HOLDF: NORMAL
DIFFERENTIAL METHOD BLD: ABNORMAL
EOSINOPHIL # BLD: 0.3 K/UL (ref 0–0.4)
EOSINOPHIL NFR BLD: 2 % (ref 0–7)
EPITH CASTS URNS QL MICRO: ABNORMAL /LPF
ERYTHROCYTE [DISTWIDTH] IN BLOOD BY AUTOMATED COUNT: 14.1 % (ref 11.5–14.5)
GLUCOSE UR STRIP.AUTO-MCNC: NEGATIVE MG/DL
HCT VFR BLD AUTO: 50.3 % (ref 35–47)
HGB BLD-MCNC: 15.7 G/DL (ref 11.5–16)
HGB UR QL STRIP: ABNORMAL
HYALINE CASTS URNS QL MICRO: ABNORMAL /LPF (ref 0–5)
IMM GRANULOCYTES # BLD AUTO: 0.1 K/UL (ref 0–0.04)
IMM GRANULOCYTES NFR BLD AUTO: 1 % (ref 0–0.5)
KETONES UR QL STRIP.AUTO: NEGATIVE MG/DL
LEUKOCYTE ESTERASE UR QL STRIP.AUTO: ABNORMAL
LYMPHOCYTES # BLD: 3.4 K/UL (ref 0.8–3.5)
LYMPHOCYTES NFR BLD: 25 % (ref 12–49)
MCH RBC QN AUTO: 30.7 PG (ref 26–34)
MCHC RBC AUTO-ENTMCNC: 31.2 G/DL (ref 30–36.5)
MCV RBC AUTO: 98.4 FL (ref 80–99)
MONOCYTES # BLD: 0.7 K/UL (ref 0–1)
MONOCYTES NFR BLD: 5 % (ref 5–13)
NEUTS SEG # BLD: 9.2 K/UL (ref 1.8–8)
NEUTS SEG NFR BLD: 66 % (ref 32–75)
NITRITE UR QL STRIP.AUTO: NEGATIVE
NRBC # BLD: 0 K/UL (ref 0–0.01)
NRBC BLD-RTO: 0 PER 100 WBC
PH UR STRIP: 5.5 [PH] (ref 5–8)
PLATELET # BLD AUTO: 190 K/UL (ref 150–400)
PMV BLD AUTO: 11.4 FL (ref 8.9–12.9)
PROT UR STRIP-MCNC: 30 MG/DL
RBC # BLD AUTO: 5.11 M/UL (ref 3.8–5.2)
RBC #/AREA URNS HPF: ABNORMAL /HPF (ref 0–5)
SAMPLES BEING HELD,HOLD: NORMAL
SAMPLES BEING HELD,HOLD: NORMAL
SP GR UR REFRACTOMETRY: 1.02 (ref 1–1.03)
UR CULT HOLD, URHOLD: NORMAL
UROBILINOGEN UR QL STRIP.AUTO: 0.2 EU/DL (ref 0.2–1)
WBC # BLD AUTO: 13.9 K/UL (ref 3.6–11)
WBC URNS QL MICRO: ABNORMAL /HPF (ref 0–4)

## 2019-05-02 PROCEDURE — 87086 URINE CULTURE/COLONY COUNT: CPT

## 2019-05-02 PROCEDURE — 85025 COMPLETE CBC W/AUTO DIFF WBC: CPT

## 2019-05-02 PROCEDURE — 84484 ASSAY OF TROPONIN QUANT: CPT

## 2019-05-02 PROCEDURE — 81001 URINALYSIS AUTO W/SCOPE: CPT

## 2019-05-02 PROCEDURE — 93005 ELECTROCARDIOGRAM TRACING: CPT

## 2019-05-02 PROCEDURE — 82550 ASSAY OF CK (CPK): CPT

## 2019-05-02 PROCEDURE — 36415 COLL VENOUS BLD VENIPUNCTURE: CPT

## 2019-05-02 PROCEDURE — 51701 INSERT BLADDER CATHETER: CPT

## 2019-05-02 PROCEDURE — 99285 EMERGENCY DEPT VISIT HI MDM: CPT

## 2019-05-02 PROCEDURE — 36600 WITHDRAWAL OF ARTERIAL BLOOD: CPT

## 2019-05-02 PROCEDURE — 80053 COMPREHEN METABOLIC PANEL: CPT

## 2019-05-02 PROCEDURE — 71046 X-RAY EXAM CHEST 2 VIEWS: CPT

## 2019-05-02 PROCEDURE — 70450 CT HEAD/BRAIN W/O DYE: CPT

## 2019-05-02 PROCEDURE — 77030011943

## 2019-05-02 PROCEDURE — 83880 ASSAY OF NATRIURETIC PEPTIDE: CPT

## 2019-05-02 NOTE — ED TRIAGE NOTES
She arrives in a wheelchair with her care giver who gives her history. The patient is confused. Dr. Deneen Fraire saw her at home and sent them for a chest xray. [facility] did the chest xray and sent her her for \"heart failure or pneumonia\".

## 2019-05-03 VITALS
HEIGHT: 62 IN | BODY MASS INDEX: 26.34 KG/M2 | TEMPERATURE: 98.4 F | DIASTOLIC BLOOD PRESSURE: 82 MMHG | HEART RATE: 90 BPM | SYSTOLIC BLOOD PRESSURE: 135 MMHG | OXYGEN SATURATION: 97 % | RESPIRATION RATE: 16 BRPM

## 2019-05-03 LAB
ALBUMIN SERPL-MCNC: 3.2 G/DL (ref 3.5–5)
ALBUMIN/GLOB SERPL: 0.7 {RATIO} (ref 1.1–2.2)
ALP SERPL-CCNC: 98 U/L (ref 45–117)
ALT SERPL-CCNC: 24 U/L (ref 12–78)
ANION GAP SERPL CALC-SCNC: 10 MMOL/L (ref 5–15)
AST SERPL-CCNC: 14 U/L (ref 15–37)
ATRIAL RATE: 85 BPM
BILIRUB SERPL-MCNC: 0.4 MG/DL (ref 0.2–1)
BNP SERPL-MCNC: 702 PG/ML
BUN SERPL-MCNC: 13 MG/DL (ref 6–20)
BUN/CREAT SERPL: 15 (ref 12–20)
CALCIUM SERPL-MCNC: 9.5 MG/DL (ref 8.5–10.1)
CALCULATED P AXIS, ECG09: 47 DEGREES
CALCULATED R AXIS, ECG10: -31 DEGREES
CALCULATED T AXIS, ECG11: 139 DEGREES
CHLORIDE SERPL-SCNC: 104 MMOL/L (ref 97–108)
CK SERPL-CCNC: 40 U/L (ref 26–192)
CO2 SERPL-SCNC: 22 MMOL/L (ref 21–32)
CREAT SERPL-MCNC: 0.84 MG/DL (ref 0.55–1.02)
DIAGNOSIS, 93000: NORMAL
GLOBULIN SER CALC-MCNC: 4.5 G/DL (ref 2–4)
GLUCOSE SERPL-MCNC: 105 MG/DL (ref 65–100)
P-R INTERVAL, ECG05: 158 MS
POTASSIUM SERPL-SCNC: 4 MMOL/L (ref 3.5–5.1)
PROT SERPL-MCNC: 7.7 G/DL (ref 6.4–8.2)
Q-T INTERVAL, ECG07: 368 MS
QRS DURATION, ECG06: 62 MS
QTC CALCULATION (BEZET), ECG08: 437 MS
SODIUM SERPL-SCNC: 136 MMOL/L (ref 136–145)
TROPONIN I SERPL-MCNC: <0.05 NG/ML
VENTRICULAR RATE, ECG03: 85 BPM

## 2019-05-03 PROCEDURE — 74011250637 HC RX REV CODE- 250/637: Performed by: EMERGENCY MEDICINE

## 2019-05-03 RX ORDER — CEFDINIR 300 MG/1
300 CAPSULE ORAL 2 TIMES DAILY
Qty: 14 CAP | Refills: 0 | Status: SHIPPED | OUTPATIENT
Start: 2019-05-03 | End: 2019-05-10

## 2019-05-03 RX ORDER — CEFDINIR 300 MG/1
300 CAPSULE ORAL
Status: COMPLETED | OUTPATIENT
Start: 2019-05-03 | End: 2019-05-03

## 2019-05-03 RX ADMIN — CEFDINIR 300 MG: 300 CAPSULE ORAL at 00:58

## 2019-05-03 NOTE — ED NOTES
Discharge paperwork discussed by provider with caregiver. Pt wheeled from department, pt condition stable.

## 2019-05-03 NOTE — ED NOTES
12:45 AM 
Pt has been given Omnicef for UTI. Urine culture ordered. Pt will be discharged with close PCP follow up. Family feels comfortable taking the patient home.

## 2019-05-03 NOTE — DISCHARGE INSTRUCTIONS
- Please call Dr. Ambar Chua in the AM and let him know you were seen in the ED and placed on Omnicef for UTI.  - Follow-up as advised. - Urine culture is pending.  - Return to ED for any concerns. Cough: Care Instructions  Your Care Instructions    A cough is your body's response to something that bothers your throat or airways. Many things can cause a cough. You might cough because of a cold or the flu, bronchitis, or asthma. Smoking, postnasal drip, allergies, and stomach acid that backs up into your throat also can cause coughs. A cough is a symptom, not a disease. Most coughs stop when the cause, such as a cold, goes away. You can take a few steps at home to cough less and feel better. Follow-up care is a key part of your treatment and safety. Be sure to make and go to all appointments, and call your doctor if you are having problems. It's also a good idea to know your test results and keep a list of the medicines you take. How can you care for yourself at home? · Drink lots of water and other fluids. This helps thin the mucus and soothes a dry or sore throat. Honey or lemon juice in hot water or tea may ease a dry cough. · Take cough medicine as directed by your doctor. · Prop up your head on pillows to help you breathe and ease a dry cough. · Try cough drops to soothe a dry or sore throat. Cough drops don't stop a cough. Medicine-flavored cough drops are no better than candy-flavored drops or hard candy. · Do not smoke. Avoid secondhand smoke. If you need help quitting, talk to your doctor about stop-smoking programs and medicines. These can increase your chances of quitting for good. When should you call for help? Call 911 anytime you think you may need emergency care. For example, call if:    · You have severe trouble breathing.    Call your doctor now or seek immediate medical care if:    · You cough up blood.     · You have new or worse trouble breathing.     · You have a new or higher fever.   · You have a new rash.    Watch closely for changes in your health, and be sure to contact your doctor if:    · You cough more deeply or more often, especially if you notice more mucus or a change in the color of your mucus.     · You have new symptoms, such as a sore throat, an earache, or sinus pain.     · You do not get better as expected. Where can you learn more? Go to http://hever-radhames.info/. Enter D279 in the search box to learn more about \"Cough: Care Instructions. \"  Current as of: September 5, 2018  Content Version: 11.9  © 1295-1030 [employer]. Care instructions adapted under license by [employer] (which disclaims liability or warranty for this information). If you have questions about a medical condition or this instruction, always ask your healthcare professional. [address] any warranty or liability for your use of this information. Urinary Tract Infection in Women: Care Instructions  Your Care Instructions    A urinary tract infection, or UTI, is a general term for an infection anywhere between the kidneys and the urethra (where urine comes out). Most UTIs are bladder infections. They often cause pain or burning when you urinate. UTIs are caused by bacteria and can be cured with antibiotics. Be sure to complete your treatment so that the infection goes away. Follow-up care is a key part of your treatment and safety. Be sure to make and go to all appointments, and call your doctor if you are having problems. It's also a good idea to know your test results and keep a list of the medicines you take. How can you care for yourself at home? · Take your antibiotics as directed. Do not stop taking them just because you feel better. You need to take the full course of antibiotics. · Drink extra water and other fluids for the next day or two. This may help wash out the bacteria that are causing the infection.  (If you have kidney, heart, or liver disease and have to limit fluids, talk with your doctor before you increase your fluid intake.)  · Avoid drinks that are carbonated or have caffeine. They can irritate the bladder. · Urinate often. Try to empty your bladder each time. · To relieve pain, take a hot bath or lay a heating pad set on low over your lower belly or genital area. Never go to sleep with a heating pad in place. To prevent UTIs  · Drink plenty of water each day. This helps you urinate often, which clears bacteria from your system. (If you have kidney, heart, or liver disease and have to limit fluids, talk with your doctor before you increase your fluid intake.)  · Urinate when you need to. · Urinate right after you have sex. · Change sanitary pads often. · Avoid douches, bubble baths, feminine hygiene sprays, and other feminine hygiene products that have deodorants. · After going to the bathroom, wipe from front to back. When should you call for help? Call your doctor now or seek immediate medical care if:    · Symptoms such as fever, chills, nausea, or vomiting get worse or appear for the first time.     · You have new pain in your back just below your rib cage. This is called flank pain.     · There is new blood or pus in your urine.     · You have any problems with your antibiotic medicine.    Watch closely for changes in your health, and be sure to contact your doctor if:    · You are not getting better after taking an antibiotic for 2 days.     · Your symptoms go away but then come back. Where can you learn more? Go to http://hever-radhames.info/. Enter L806 in the search box to learn more about \"Urinary Tract Infection in Women: Care Instructions. \"  Current as of: March 20, 2018  Content Version: 11.9  © 4113-8487 [employer]. Care instructions adapted under license by [employer] (which disclaims liability or warranty for this information).  If you have questions about a medical condition or this instruction, always ask your healthcare professional. [place] [address] any warranty or liability for your use of this information.

## 2019-05-03 NOTE — ED PROVIDER NOTES
80 y.o. female with past medical history significant for dementia, HTN, hyperlipidemia, stroke, arthritis, alcoholism, depression, nontraumatic intraventricular hemorrhage, osteoporosis, hypovitaminosis D, mass of pancreas, and diverticulosis who presents from [facility] with chief complaint of cough. Caregiver states that the in house physician came to the pt's home today and sent her for [facility] for CXR. She then states that the pt was referred to [facility] ED to evaluate for heart failure or fluid in the lungs. Per family, the pt has been coughing for ~3 weeks. She also experienced some confusion yesterday and the caregiver and family were concerned for UTI but has not yet been evaluated for this as she wears a diaper so needs to be cathed for urine. The caregiver also notes that the pt has been experiencing severe night sweats but no fever. No cardiac hx or h/o heart failure. Pt has h/o stroke 3 years ago. Pt denies pain or fever. There are no other acute medical concerns at this time. Full history, physical exam, and ROS unable to be obtained due to:  dementia. Social hx: former tobacco smoker; (+) EtOH use PCP: Ayan Iglesias MD 
 
Note written by Twila Fleming, as dictated by Michelle Rivera MD 9:23 PM 
 
The history is provided by the patient. No  was used. Past Medical History:  
Diagnosis Date  Alcoholism ([address] 11/10/2011  Arthritis  Dementia 11/10/2011  Dementia  Depression 11/10/2011  Diverticulosis 1/27/2014  
 HTN (hypertension) 6/18/2012  Hyperlipidemia 6/24/2012  Hypertension  Hypovitaminosis D 12/17/2013  Intraventricular hemorrhage, nontraumatic ([address] 6/18/2012  Mass of pancreas 1/27/2014  
 1.5 CM DENSITY POST HEAD OF PANCREAS ON CT 1/27/2014  Osteoporosis 12/16/2013  Other ill-defined conditions(799.89) broken right shoulder  Stroke ([address] Past Surgical History:  
Procedure Laterality Date  HX APPENDECTOMY  HX CATARACT REMOVAL    
 HX CHOLECYSTECTOMY  HX HYSTERECTOMY  HX ORTHOPAEDIC    
 repair lt shoulder fx History reviewed. No pertinent family history. Social History Socioeconomic History  Marital status:  Spouse name: Not on file  Number of children: Not on file  Years of education: Not on file  Highest education level: Not on file Occupational History  Not on file Social Needs  Financial resource strain: Not on file  Food insecurity:  
  Worry: Not on file Inability: Not on file  Transportation needs:  
  Medical: Not on file Non-medical: Not on file Tobacco Use  Smoking status: Former Smoker Packs/day: 1.00 Years: 50.00 Pack years: 50.00 Substance and Sexual Activity  Alcohol use: Yes Comment: dtrs state amount unknown  Drug use: No  
 Sexual activity: Not on file Lifestyle  Physical activity:  
  Days per week: Not on file Minutes per session: Not on file  Stress: Not on file Relationships  Social connections:  
  Talks on phone: Not on file Gets together: Not on file Attends [employer] service: Not on file Active member of club or organization: Not on file Attends meetings of clubs or organizations: Not on file Relationship status: Not on file  Intimate partner violence:  
  Fear of current or ex partner: Not on file Emotionally abused: Not on file Physically abused: Not on file Forced sexual activity: Not on file Other Topics Concern  Not on file Social History Narrative ** Merged History Encounter ** ALLERGIES: Codeine; Ibuprofen; and Pcn [penicillins] Review of Systems Constitutional: Positive for diaphoresis. Negative for fever. HENT: Negative for facial swelling and nosebleeds. Eyes: Negative for pain. Respiratory: Positive for cough. Negative for chest tightness and shortness of breath. Cardiovascular: Negative for chest pain and leg swelling. Gastrointestinal: Negative for abdominal pain, diarrhea and vomiting. Endocrine: Negative for polyuria. Genitourinary: Negative for difficulty urinating and flank pain. Musculoskeletal: Negative for arthralgias and back pain. Skin: Negative for color change. Allergic/Immunologic: Negative for immunocompromised state. Neurological: Negative for dizziness and headaches. Hematological: Does not bruise/bleed easily. Psychiatric/Behavioral: Positive for confusion. Negative for agitation. All other systems reviewed and are negative. Vitals:  
 05/02/19 1940 05/02/19 2011 BP:  145/89 Pulse:  (!) 103 Resp:  16 Temp:  98.5 °F (36.9 °C) SpO2: 96% 98% Height:  5' 2\" (1.575 m) Physical Exam  
Constitutional: She appears well-developed and well-nourished. HENT:  
Head: Normocephalic and atraumatic. Right Ear: External ear normal.  
Left Ear: External ear normal.  
Nose: Nose normal.  
Mouth/Throat: Oropharynx is clear and moist.  
Eyes: Pupils are equal, round, and reactive to light. EOM are normal. No scleral icterus. Neck: Normal range of motion. Neck supple. No JVD present. No tracheal deviation present. No thyromegaly present. Cardiovascular: Normal rate, regular rhythm, normal heart sounds and intact distal pulses. Exam reveals no friction rub. No murmur heard. Pulmonary/Chest: Effort normal and breath sounds normal. No stridor. No respiratory distress. She has no wheezes. She has no rales. She exhibits no tenderness. Abdominal: Soft. Bowel sounds are normal. She exhibits no distension. There is no tenderness. There is no rebound and no guarding. Musculoskeletal: Normal range of motion. She exhibits no edema or tenderness. No edema in legs. Moving all four extremities equally. Lymphadenopathy:  
  She has no cervical adenopathy. Neurological: She has normal reflexes. No cranial nerve deficit. Coordination normal.  
Skin: Skin is warm and dry. No rash noted. No erythema. Psychiatric: She has a normal mood and affect. Her behavior is normal. Judgment and thought content normal.  
Nursing note and vitals reviewed. Note written by Twila Moser, as dictated by Claire Gonzalez MD 9:28 PM  
 
MDM Number of Diagnoses or Management Options Diagnosis management comments: 81 yo WF presents with some confusion yesterday. She had a chest x-ray done today that showed possible CHF versus pneumonia. Patient also has a history of UTI. Will check blood work, urinalysis, CT head, chest x-ray. Amount and/or Complexity of Data Reviewed Clinical lab tests: ordered and reviewed Tests in the radiology section of CPT®: ordered and reviewed Tests in the medicine section of CPT®: ordered and reviewed Decide to obtain previous medical records or to obtain history from someone other than the patient: yes Obtain history from someone other than the patient: yes Review and summarize past medical records: yes Independent visualization of images, tracings, or specimens: yes Risk of Complications, Morbidity, and/or Mortality Presenting problems: high Diagnostic procedures: high Management options: high Procedures PROGRESS NOTE: 
9:45 PM 
Labs all hemolyzed. Will have to redraw. PROGRESS NOTE: 
10:10 PM 
CXR shows no acute process. CT head shows no acute abnormality. 10:49 PM 
Labs are still pending. Care turned over to Dr Paola Brower with labs pending Pt is with family and they are very reasonable and pt can f/u w/ Dr Cartwright Bolds tomorrow if labs are reasonable

## 2019-05-03 NOTE — ED NOTES
Verbal shift change report given to Maria E Ferguson RN (oncoming nurse) by Jessica Castro RN  (offgoing nurse). Report included the following information SBAR and ED Summary.

## 2019-05-04 LAB
BACTERIA SPEC CULT: NORMAL
CC UR VC: NORMAL
SERVICE CMNT-IMP: NORMAL

## 2020-04-29 ENCOUNTER — HOSPITAL ENCOUNTER (EMERGENCY)
Age: 85
Discharge: HOME | End: 2020-04-30
Attending: EMERGENCY MEDICINE
Payer: MEDICARE

## 2020-04-29 ENCOUNTER — APPOINTMENT (OUTPATIENT)
Dept: GENERAL RADIOLOGY | Age: 85
End: 2020-04-29
Attending: EMERGENCY MEDICINE
Payer: MEDICARE

## 2020-04-29 DIAGNOSIS — N30.00 ACUTE CYSTITIS WITHOUT HEMATURIA: Primary | ICD-10-CM

## 2020-04-29 DIAGNOSIS — J45.21 MILD INTERMITTENT REACTIVE AIRWAY DISEASE WITH ACUTE EXACERBATION: ICD-10-CM

## 2020-04-29 LAB
ALBUMIN SERPL-MCNC: 3.6 G/DL (ref 3.5–5)
ALBUMIN/GLOB SERPL: 0.8 {RATIO} (ref 1.1–2.2)
ALP SERPL-CCNC: 103 U/L (ref 45–117)
ALT SERPL-CCNC: 21 U/L (ref 12–78)
ANION GAP SERPL CALC-SCNC: 7 MMOL/L (ref 5–15)
APPEARANCE UR: CLEAR
AST SERPL-CCNC: 20 U/L (ref 15–37)
ATRIAL RATE: 95 BPM
BACTERIA URNS QL MICRO: NEGATIVE /HPF
BASOPHILS # BLD: 0.1 K/UL (ref 0–0.1)
BASOPHILS NFR BLD: 1 % (ref 0–1)
BILIRUB SERPL-MCNC: 0.2 MG/DL (ref 0.2–1)
BILIRUB UR QL: NEGATIVE
BUN SERPL-MCNC: 12 MG/DL (ref 6–20)
BUN/CREAT SERPL: 13 (ref 12–20)
CALCIUM SERPL-MCNC: 9.5 MG/DL (ref 8.5–10.1)
CALCULATED P AXIS, ECG09: 66 DEGREES
CALCULATED R AXIS, ECG10: -34 DEGREES
CALCULATED T AXIS, ECG11: 56 DEGREES
CHLORIDE SERPL-SCNC: 105 MMOL/L (ref 97–108)
CO2 SERPL-SCNC: 26 MMOL/L (ref 21–32)
COLOR UR: ABNORMAL
COMMENT, HOLDF: NORMAL
CREAT SERPL-MCNC: 0.9 MG/DL (ref 0.55–1.02)
DIAGNOSIS, 93000: NORMAL
DIFFERENTIAL METHOD BLD: ABNORMAL
EOSINOPHIL # BLD: 0.4 K/UL (ref 0–0.4)
EOSINOPHIL NFR BLD: 5 % (ref 0–7)
EPITH CASTS URNS QL MICRO: ABNORMAL /LPF
ERYTHROCYTE [DISTWIDTH] IN BLOOD BY AUTOMATED COUNT: 14.1 % (ref 11.5–14.5)
GLOBULIN SER CALC-MCNC: 4.6 G/DL (ref 2–4)
GLUCOSE SERPL-MCNC: 99 MG/DL (ref 65–100)
GLUCOSE UR STRIP.AUTO-MCNC: NEGATIVE MG/DL
HCT VFR BLD AUTO: 41.9 % (ref 35–47)
HGB BLD-MCNC: 12.6 G/DL (ref 11.5–16)
HGB UR QL STRIP: ABNORMAL
HYALINE CASTS URNS QL MICRO: ABNORMAL /LPF (ref 0–5)
IMM GRANULOCYTES # BLD AUTO: 0 K/UL (ref 0–0.04)
IMM GRANULOCYTES NFR BLD AUTO: 1 % (ref 0–0.5)
KETONES UR QL STRIP.AUTO: NEGATIVE MG/DL
LACTATE SERPL-SCNC: 2.7 MMOL/L (ref 0.4–2)
LEUKOCYTE ESTERASE UR QL STRIP.AUTO: ABNORMAL
LYMPHOCYTES # BLD: 2.6 K/UL (ref 0.8–3.5)
LYMPHOCYTES NFR BLD: 31 % (ref 12–49)
MCH RBC QN AUTO: 28.6 PG (ref 26–34)
MCHC RBC AUTO-ENTMCNC: 30.1 G/DL (ref 30–36.5)
MCV RBC AUTO: 95.2 FL (ref 80–99)
MONOCYTES # BLD: 0.5 K/UL (ref 0–1)
MONOCYTES NFR BLD: 6 % (ref 5–13)
NEUTS SEG # BLD: 4.9 K/UL (ref 1.8–8)
NEUTS SEG NFR BLD: 56 % (ref 32–75)
NITRITE UR QL STRIP.AUTO: NEGATIVE
NRBC # BLD: 0 K/UL (ref 0–0.01)
NRBC BLD-RTO: 0 PER 100 WBC
P-R INTERVAL, ECG05: 206 MS
PH UR STRIP: 5 [PH] (ref 5–8)
PLATELET # BLD AUTO: 217 K/UL (ref 150–400)
PMV BLD AUTO: 10.2 FL (ref 8.9–12.9)
POTASSIUM SERPL-SCNC: 4 MMOL/L (ref 3.5–5.1)
PROT SERPL-MCNC: 8.2 G/DL (ref 6.4–8.2)
PROT UR STRIP-MCNC: 30 MG/DL
Q-T INTERVAL, ECG07: 374 MS
QRS DURATION, ECG06: 70 MS
QTC CALCULATION (BEZET), ECG08: 469 MS
RBC # BLD AUTO: 4.4 M/UL (ref 3.8–5.2)
RBC #/AREA URNS HPF: ABNORMAL /HPF (ref 0–5)
SAMPLES BEING HELD,HOLD: NORMAL
SODIUM SERPL-SCNC: 138 MMOL/L (ref 136–145)
SP GR UR REFRACTOMETRY: 1.02 (ref 1–1.03)
TROPONIN I SERPL-MCNC: <0.05 NG/ML
UR CULT HOLD, URHOLD: NORMAL
UROBILINOGEN UR QL STRIP.AUTO: 0.2 EU/DL (ref 0.2–1)
VENTRICULAR RATE, ECG03: 95 BPM
WBC # BLD AUTO: 8.5 K/UL (ref 3.6–11)
WBC URNS QL MICRO: ABNORMAL /HPF (ref 0–4)

## 2020-04-29 PROCEDURE — 87086 URINE CULTURE/COLONY COUNT: CPT

## 2020-04-29 PROCEDURE — 84484 ASSAY OF TROPONIN QUANT: CPT

## 2020-04-29 PROCEDURE — 81001 URINALYSIS AUTO W/SCOPE: CPT

## 2020-04-29 PROCEDURE — 74011250636 HC RX REV CODE- 250/636: Performed by: EMERGENCY MEDICINE

## 2020-04-29 PROCEDURE — 87040 BLOOD CULTURE FOR BACTERIA: CPT

## 2020-04-29 PROCEDURE — 93005 ELECTROCARDIOGRAM TRACING: CPT

## 2020-04-29 PROCEDURE — 77030019905 HC CATH URETH INTMIT MDII -A

## 2020-04-29 PROCEDURE — 94664 DEMO&/EVAL PT USE INHALER: CPT

## 2020-04-29 PROCEDURE — 96365 THER/PROPH/DIAG IV INF INIT: CPT

## 2020-04-29 PROCEDURE — 96361 HYDRATE IV INFUSION ADD-ON: CPT

## 2020-04-29 PROCEDURE — 74011250637 HC RX REV CODE- 250/637: Performed by: EMERGENCY MEDICINE

## 2020-04-29 PROCEDURE — 71045 X-RAY EXAM CHEST 1 VIEW: CPT

## 2020-04-29 PROCEDURE — 80053 COMPREHEN METABOLIC PANEL: CPT

## 2020-04-29 PROCEDURE — 85025 COMPLETE CBC W/AUTO DIFF WBC: CPT

## 2020-04-29 PROCEDURE — 94640 AIRWAY INHALATION TREATMENT: CPT

## 2020-04-29 PROCEDURE — 74011000258 HC RX REV CODE- 258: Performed by: EMERGENCY MEDICINE

## 2020-04-29 PROCEDURE — 36415 COLL VENOUS BLD VENIPUNCTURE: CPT

## 2020-04-29 PROCEDURE — 83605 ASSAY OF LACTIC ACID: CPT

## 2020-04-29 PROCEDURE — 96375 TX/PRO/DX INJ NEW DRUG ADDON: CPT

## 2020-04-29 PROCEDURE — 99284 EMERGENCY DEPT VISIT MOD MDM: CPT

## 2020-04-29 RX ORDER — ALBUTEROL SULFATE 90 UG/1
4-6 AEROSOL, METERED RESPIRATORY (INHALATION) ONCE
Status: COMPLETED | OUTPATIENT
Start: 2020-04-29 | End: 2020-04-29

## 2020-04-29 RX ADMIN — SODIUM CHLORIDE 500 ML: 900 INJECTION, SOLUTION INTRAVENOUS at 23:27

## 2020-04-29 RX ADMIN — ALBUTEROL SULFATE 6 PUFF: 90 AEROSOL, METERED RESPIRATORY (INHALATION) at 20:50

## 2020-04-29 RX ADMIN — SODIUM CHLORIDE 1000 ML: 900 INJECTION, SOLUTION INTRAVENOUS at 22:48

## 2020-04-29 RX ADMIN — CEFTRIAXONE 1 G: 1 INJECTION, POWDER, FOR SOLUTION INTRAMUSCULAR; INTRAVENOUS at 22:48

## 2020-04-30 VITALS
TEMPERATURE: 98.2 F | RESPIRATION RATE: 18 BRPM | HEART RATE: 87 BPM | DIASTOLIC BLOOD PRESSURE: 57 MMHG | SYSTOLIC BLOOD PRESSURE: 150 MMHG | OXYGEN SATURATION: 96 %

## 2020-04-30 LAB
BACTERIA SPEC CULT: NORMAL
LACTATE SERPL-SCNC: 1.2 MMOL/L (ref 0.4–2)
SERVICE CMNT-IMP: NORMAL

## 2020-04-30 PROCEDURE — 74011250637 HC RX REV CODE- 250/637: Performed by: EMERGENCY MEDICINE

## 2020-04-30 PROCEDURE — 83605 ASSAY OF LACTIC ACID: CPT

## 2020-04-30 PROCEDURE — 36415 COLL VENOUS BLD VENIPUNCTURE: CPT

## 2020-04-30 PROCEDURE — 74011250636 HC RX REV CODE- 250/636: Performed by: EMERGENCY MEDICINE

## 2020-04-30 PROCEDURE — 96375 TX/PRO/DX INJ NEW DRUG ADDON: CPT

## 2020-04-30 RX ORDER — CEPHALEXIN 500 MG/1
500 CAPSULE ORAL 2 TIMES DAILY
Qty: 20 CAP | Refills: 0 | Status: SHIPPED | OUTPATIENT
Start: 2020-04-30 | End: 2020-05-10

## 2020-04-30 RX ORDER — ALBUTEROL SULFATE 90 UG/1
6 AEROSOL, METERED RESPIRATORY (INHALATION) ONCE
Status: COMPLETED | OUTPATIENT
Start: 2020-04-30 | End: 2020-04-30

## 2020-04-30 RX ORDER — PREDNISONE 20 MG/1
60 TABLET ORAL DAILY
Qty: 12 TAB | Refills: 0 | Status: SHIPPED | OUTPATIENT
Start: 2020-04-30 | End: 2020-05-04

## 2020-04-30 RX ORDER — CEPHALEXIN 500 MG/1
500 CAPSULE ORAL 2 TIMES DAILY
Qty: 20 CAP | Refills: 0 | Status: SHIPPED | OUTPATIENT
Start: 2020-04-30 | End: 2020-04-30

## 2020-04-30 RX ADMIN — METHYLPREDNISOLONE SODIUM SUCCINATE 125 MG: 125 INJECTION, POWDER, FOR SOLUTION INTRAMUSCULAR; INTRAVENOUS at 02:32

## 2020-04-30 RX ADMIN — ALBUTEROL SULFATE 6 PUFF: 90 AEROSOL, METERED RESPIRATORY (INHALATION) at 02:23

## 2020-04-30 NOTE — ED PROVIDER NOTES
HPI     80year old female with dementia, diverticulosis, htn, chol, h/o IVH,      Per caregiver and :  Was eating dinner, started coughing, turning blue- not sure if coughed. More confused today then baseline. Had a coughing spell in past with wheezing 2 weeks ago. Similar episode 2 weeks ago- congested with URI. Maybe has UTI. Per , she does not have a living will but  would not want CPR/intubation. Past Medical History:   Diagnosis Date    Alcoholism ([facility] [address] 11/10/2011    Arthritis     Dementia 11/10/2011    Dementia     Depression 11/10/2011    Diverticulosis 1/27/2014    HTN (hypertension) 6/18/2012    Hyperlipidemia 6/24/2012    Hypertension     Hypovitaminosis D 12/17/2013    [facility], [facility] 6/18/2012    Mass of pancreas 1/27/2014    1.5 CM DENSITY POST HEAD OF PANCREAS ON CT 1/27/2014    Osteoporosis 12/16/2013    Other ill-defined conditions(799.89)     broken right shoulder    Stroke [facility])        Past Surgical History:   Procedure Laterality Date    HX APPENDECTOMY      HX CATARACT REMOVAL      HX CHOLECYSTECTOMY      HX HYSTERECTOMY      HX ORTHOPAEDIC      repair lt shoulder fx         No family history on file.     Social History     Socioeconomic History    Marital status:      Spouse name: Not on file    Number of children: Not on file    Years of education: Not on file    Highest education level: Not on file   Occupational History    Not on file   Social Needs    Financial resource strain: Not on file    Food insecurity     Worry: Not on file     Inability: Not on file    Transportation needs     Medical: Not on file     Non-medical: Not on file   Tobacco Use    Smoking status: Former Smoker     Packs/day: 1.00     Years: 50.00     Pack years: 50.00   Substance and Sexual Activity    Alcohol use: Yes     Comment: dtrs state amount unknown    Drug use: No    Sexual activity: Not on file   Lifestyle    Physical activity     Days per week: Not on file     Minutes per session: Not on file    Stress: Not on file   Relationships    Social connections     Talks on phone: Not on file     Gets together: Not on file     Attends Yarsanism service: Not on file     Active member of club or organization: Not on file     Attends meetings of clubs or organizations: Not on file     Relationship status: Not on file    Intimate partner violence     Fear of current or ex partner: Not on file     Emotionally abused: Not on file     Physically abused: Not on file     Forced sexual activity: Not on file   Other Topics Concern    Not on file   Social History Narrative    ** Merged History Encounter **              ALLERGIES: Codeine; Ibuprofen; and Pcn [penicillins]    Review of Systems   Unable to perform ROS: Dementia       Vitals:    04/29/20 2016   BP: 124/62   Pulse: 89   Resp: 18   Temp: 98.8 °F (37.1 °C)   SpO2: 93%            Physical Exam  Constitutional:       General: She is not in acute distress. Appearance: She is well-developed. HENT:      Head: Normocephalic and atraumatic. Mouth/Throat:      Pharynx: No oropharyngeal exudate. Eyes:      General: No scleral icterus. Right eye: No discharge. Left eye: No discharge. Pupils: Pupils are equal, round, and reactive to light. Neck:      Musculoskeletal: Normal range of motion and neck supple. Vascular: No JVD. Cardiovascular:      Rate and Rhythm: Normal rate and regular rhythm. Heart sounds: Normal heart sounds. No murmur. Pulmonary:      Effort: Pulmonary effort is normal. No respiratory distress. Breath sounds: No stridor. Wheezing present. No rales. Chest:      Chest wall: No tenderness. Abdominal:      General: Bowel sounds are normal. There is no distension. Palpations: Abdomen is soft. There is no mass. Tenderness: There is no abdominal tenderness. There is no guarding or rebound.    Musculoskeletal: Normal range of motion. Skin:     General: Skin is warm and dry. Capillary Refill: Capillary refill takes less than 2 seconds. Findings: No rash. Neurological:      Mental Status: Mental status is at baseline. MDM       Procedures        ED EKG interpretation:  Rhythm: normal sinus rhythm; and regular . Rate (approx.): 95; Axis: left axis deviation; P wave: normal; QRS interval: normal ; ST/T wave: non-specific changes; . This EKG was interpreted by Sandeep Villa MD,ED Provider. CXR clear. Labs at baseline except for elevated lactate at 2.7. Urine concerning for UTI. Will give fluids, dose of rocephin and repeat lactate.

## 2020-04-30 NOTE — DISCHARGE INSTRUCTIONS
Patient Education      Mrs. Afsaneh Rod does have a urinary tract infection which we have treated with a dose of IV antibiotics here and have sent her home with a prescription to fill tomorrow. She will need to take one tablet twice day for 10 days. She also has some wheezing in her lung. Chest xray is clear, no sign of pneumonia or fluid in her lungs. We have started her on a short course of higher dose Prednisone (3 of the 20 mg tablets (60mg) once daily for 4 more days)  I have sent a message to Dr. Kristian Johnson so he is aware of her visit. She should be reevaluated in 1-2 days. If you feel her breathing is getting worse, she develops a fever, stops eating, has vomiting, increasing confusion, etc. Return here. Urinary Tract Infection in Women: Care Instructions  Your Care Instructions    A urinary tract infection, or UTI, is a general term for an infection anywhere between the kidneys and the urethra (where urine comes out). Most UTIs are bladder infections. They often cause pain or burning when you urinate. UTIs are caused by bacteria and can be cured with antibiotics. Be sure to complete your treatment so that the infection goes away. Follow-up care is a key part of your treatment and safety. Be sure to make and go to all appointments, and call your doctor if you are having problems. It's also a good idea to know your test results and keep a list of the medicines you take. How can you care for yourself at home? · Take your antibiotics as directed. Do not stop taking them just because you feel better. You need to take the full course of antibiotics. · Drink extra water and other fluids for the next day or two. This may help wash out the bacteria that are causing the infection. (If you have kidney, heart, or liver disease and have to limit fluids, talk with your doctor before you increase your fluid intake.)  · Avoid drinks that are carbonated or have caffeine. They can irritate the bladder.   · Urinate often. Try to empty your bladder each time. · To relieve pain, take a hot bath or lay a heating pad set on low over your lower belly or genital area. Never go to sleep with a heating pad in place. To prevent UTIs  · Drink plenty of water each day. This helps you urinate often, which clears bacteria from your system. (If you have kidney, heart, or liver disease and have to limit fluids, talk with your doctor before you increase your fluid intake.)  · Urinate when you need to. · Urinate right after you have sex. · Change sanitary pads often. · Avoid douches, bubble baths, feminine hygiene sprays, and other feminine hygiene products that have deodorants. · After going to the bathroom, wipe from front to back. When should you call for help? Call your doctor now or seek immediate medical care if:    · Symptoms such as fever, chills, nausea, or vomiting get worse or appear for the first time.     · You have new pain in your back just below your rib cage. This is called flank pain.     · There is new blood or pus in your urine.     · You have any problems with your antibiotic medicine.    Watch closely for changes in your health, and be sure to contact your doctor if:    · You are not getting better after taking an antibiotic for 2 days.     · Your symptoms go away but then come back. Where can you learn more? Go to http://hever-radhames.info/  Enter P872 in the search box to learn more about \"Urinary Tract Infection in Women: Care Instructions. \"  Current as of: August 21, 2019Content Version: 12.4  © 8954-3738 Healthwise, Incorporated. Care instructions adapted under license by [employer] (which disclaims liability or warranty for this information). If you have questions about a medical condition or this instruction, always ask your healthcare professional. [address] any warranty or liability for your use of this information.        Patient Education Reactive Airway Disease: Care Instructions  Your Care Instructions    Reactive airway disease is a breathing problem that appears as wheezing, a whistling noise in your airways. It may be caused by a viral or bacterial infection, allergies, tobacco smoke, or something else in the environment. When you are around these triggers, your body releases chemicals that make the airways get tight. Reactive airway disease is a lot like asthma. Both can cause wheezing. But asthma is ongoing, while reactive airway disease may occur only now and then. Tests can be done to tell whether you have asthma. You may take the same medicines used to treat asthma. Good home care and follow-up care with your doctor can help you recover. Follow-up care is a key part of your treatment and safety. Be sure to make and go to all appointments, and call your doctor if you are having problems. It's also a good idea to know your test results and keep a list of the medicines you take. How can you care for yourself at home? · Take your medicines exactly as prescribed. Call your doctor if you think you are having a problem with your medicine. · Do not smoke or allow others to smoke around you. If you need help quitting, talk to your doctor about stop-smoking programs and medicines. These can increase your chances of quitting for good. · If you know what caused your wheezing (such as perfume or the odor of household chemicals), try to avoid it in the future. · Wash your hands several times a day, and consider using hand gels or wipes that contain alcohol. This can prevent colds and other infections. When should you call for help? Call 911 anytime you think you may need emergency care. For example, call if:    · You have severe trouble breathing.    Watch closely for changes in your health, and be sure to contact your doctor if:    · You cough up yellow, dark brown, or bloody mucus.     · You have a fever.     · Your wheezing gets worse. Where can you learn more? Go to http://hever-radhames.info/  Enter G945 in the search box to learn more about \"Reactive Airway Disease: Care Instructions. \"  Current as of: June 9, 2019Content Version: 12.4  © 7941-5019 Healthwise, Incorporated. Care instructions adapted under license by [employer] (which disclaims liability or warranty for this information). If you have questions about a medical condition or this instruction, always ask your healthcare professional. [address] any warranty or liability for your use of this information.

## 2020-04-30 NOTE — ED TRIAGE NOTES
Pt choked on food at home and was coughing significantly afterwards. Family requested transport to hospital to have patient \"checked out\". Pt appears in no distress, c/o pain to head and throat. Hx of dementia.

## 2020-05-01 ENCOUNTER — PATIENT OUTREACH (OUTPATIENT)
Dept: CASE MANAGEMENT | Age: 85
End: 2020-05-01

## 2020-05-01 NOTE — PROGRESS NOTES
Patient contacted regarding recent discharge and COVID-19 risk   Care Transition Nurse/ Ambulatory Care Manager contacted the family (patients  and caregiver) by telephone to perform post discharge assessment. Verified name and  with family as identifiers. Patient has following risk factors of: no known risk factors. CTN/ACM reviewed discharge instructions, medical action plan and red flags related to discharge diagnosis. Reviewed and educated them on any new and changed medications related to discharge diagnosis. Advised obtaining a 90-day supply of all daily and as-needed medications. Education provided regarding infection prevention, and signs and symptoms of COVID-19 and when to seek medical attention with family who verbalized understanding. Discussed exposure protocols and quarantine from [address] you at higher risk for severe illness  and given an opportunity for questions and concerns. The family agrees to contact the COVID-19 hotline [phone] or PCP office for questions related to their healthcare. CTN/ACM provided contact information for future reference. From CDC: Are you at higher risk for severe illness?  Wash your hands often.  Avoid close contact (6 feet, which is about two arm lengths) with people who are sick.  Put distance between yourself and other people if COVID-19 is spreading in your community.  Clean and disinfect frequently touched surfaces.  Avoid all cruise travel and non-essential air travel.  Call your healthcare professional if you have concerns about COVID-19 and your underlying condition or if you are sick.     For more information on steps you can take to protect yourself, see CDC's How to Protect Yourself      Patient/family/caregiver given information for Joey Laureano and agrees to enroll no  Patient's preferred e-mail:  n/a  Patient's preferred phone number: n/a  Based on Loop alert triggers, patient will be contacted by nurse care manager for worsening symptoms. Plan for follow-up call in 7-14 days based on severity of symptoms and risk factors. Patients  states Farideh Nova is much better today\". He verified that she was able to pickup and start all ordered medications. Provided COVID teaching and phone number for exposure line should she have any sx's. Recommended / caregiver call and schedule PCP follow up today and they agreed. No further questions at this time.

## 2020-05-05 LAB
BACTERIA SPEC CULT: NORMAL
BACTERIA SPEC CULT: NORMAL
SERVICE CMNT-IMP: NORMAL
SERVICE CMNT-IMP: NORMAL

## 2020-05-14 ENCOUNTER — PATIENT OUTREACH (OUTPATIENT)
Dept: CASE MANAGEMENT | Age: 85
End: 2020-05-14

## 2020-05-14 NOTE — PROGRESS NOTES
Patient resolved from Transition of Care episode on 5/14/20  Patient/family has been provided the following resources and education related to COVID-19:                         Signs, symptoms and red flags related to COVID-19            CDC exposure and quarantine guidelines            [facility] exposure contact - [phone]            Contact for their local Department of Health                 Patient currently reports that the following symptoms have improved:  patients caregiver reports that Yumiko Olivarez is doing fine, thank you for calling\". No further concerns noted at this time. No further outreach scheduled with this CTN/ACM. Episode of Care resolved. Patient has this CTN/ACM contact information if future needs arise.

## 2021-01-02 ENCOUNTER — APPOINTMENT (OUTPATIENT)
Dept: GENERAL RADIOLOGY | Age: 86
End: 2021-01-02
Attending: EMERGENCY MEDICINE
Payer: MEDICARE

## 2021-01-02 ENCOUNTER — HOSPITAL ENCOUNTER (EMERGENCY)
Age: 86
Discharge: HOME | End: 2021-01-02
Attending: EMERGENCY MEDICINE
Payer: MEDICARE

## 2021-01-02 ENCOUNTER — APPOINTMENT (OUTPATIENT)
Dept: CT IMAGING | Age: 86
End: 2021-01-02
Attending: EMERGENCY MEDICINE
Payer: MEDICARE

## 2021-01-02 VITALS
RESPIRATION RATE: 18 BRPM | OXYGEN SATURATION: 95 % | TEMPERATURE: 97.6 F | HEART RATE: 81 BPM | DIASTOLIC BLOOD PRESSURE: 63 MMHG | SYSTOLIC BLOOD PRESSURE: 121 MMHG

## 2021-01-02 DIAGNOSIS — R06.02 SOB (SHORTNESS OF BREATH): Primary | ICD-10-CM

## 2021-01-02 LAB
APPEARANCE UR: CLEAR
BACTERIA URNS QL MICRO: NEGATIVE /HPF
BILIRUB UR QL: NEGATIVE
COLOR UR: ABNORMAL
COMMENT, HOLDF: NORMAL
EPITH CASTS URNS QL MICRO: ABNORMAL /LPF
GLUCOSE UR STRIP.AUTO-MCNC: NEGATIVE MG/DL
HGB UR QL STRIP: ABNORMAL
KETONES UR QL STRIP.AUTO: NEGATIVE MG/DL
LEUKOCYTE ESTERASE UR QL STRIP.AUTO: NEGATIVE
NITRITE UR QL STRIP.AUTO: NEGATIVE
PH UR STRIP: 5.5 [PH] (ref 5–8)
PROT UR STRIP-MCNC: ABNORMAL MG/DL
RBC #/AREA URNS HPF: ABNORMAL /HPF (ref 0–5)
SAMPLES BEING HELD,HOLD: NORMAL
SP GR UR REFRACTOMETRY: 1.02 (ref 1–1.03)
UA: UC IF INDICATED,UAUC: ABNORMAL
UROBILINOGEN UR QL STRIP.AUTO: 0.2 EU/DL (ref 0.2–1)
WBC URNS QL MICRO: ABNORMAL /HPF (ref 0–4)

## 2021-01-02 PROCEDURE — 71045 X-RAY EXAM CHEST 1 VIEW: CPT

## 2021-01-02 PROCEDURE — 96374 THER/PROPH/DIAG INJ IV PUSH: CPT

## 2021-01-02 PROCEDURE — 70490 CT SOFT TISSUE NECK W/O DYE: CPT

## 2021-01-02 PROCEDURE — 87635 SARS-COV-2 COVID-19 AMP PRB: CPT

## 2021-01-02 PROCEDURE — 99285 EMERGENCY DEPT VISIT HI MDM: CPT

## 2021-01-02 PROCEDURE — 74011250636 HC RX REV CODE- 250/636: Performed by: EMERGENCY MEDICINE

## 2021-01-02 PROCEDURE — 94664 DEMO&/EVAL PT USE INHALER: CPT

## 2021-01-02 PROCEDURE — 74011250637 HC RX REV CODE- 250/637: Performed by: EMERGENCY MEDICINE

## 2021-01-02 PROCEDURE — 94640 AIRWAY INHALATION TREATMENT: CPT

## 2021-01-02 PROCEDURE — 81001 URINALYSIS AUTO W/SCOPE: CPT

## 2021-01-02 RX ORDER — DEXAMETHASONE SODIUM PHOSPHATE 10 MG/ML
10 INJECTION INTRAMUSCULAR; INTRAVENOUS ONCE
Status: COMPLETED | OUTPATIENT
Start: 2021-01-02 | End: 2021-01-02

## 2021-01-02 RX ORDER — ALBUTEROL SULFATE 90 UG/1
AEROSOL, METERED RESPIRATORY (INHALATION)
Qty: 1 INHALER | Refills: 11 | Status: SHIPPED | OUTPATIENT
Start: 2021-01-02

## 2021-01-02 RX ORDER — GUAIFENESIN 600 MG/1
600 TABLET, EXTENDED RELEASE ORAL 2 TIMES DAILY
Qty: 20 TAB | Refills: 0 | Status: SHIPPED | OUTPATIENT
Start: 2021-01-02 | End: 2021-01-06 | Stop reason: ALTCHOICE

## 2021-01-02 RX ORDER — ALBUTEROL SULFATE 90 UG/1
8 AEROSOL, METERED RESPIRATORY (INHALATION)
Status: DISCONTINUED | OUTPATIENT
Start: 2021-01-02 | End: 2021-01-02

## 2021-01-02 RX ORDER — ALBUTEROL SULFATE 90 UG/1
8 AEROSOL, METERED RESPIRATORY (INHALATION)
Status: COMPLETED | OUTPATIENT
Start: 2021-01-02 | End: 2021-01-02

## 2021-01-02 RX ORDER — ALBUTEROL SULFATE 0.83 MG/ML
SOLUTION RESPIRATORY (INHALATION)
Qty: 75 ML | Refills: 2 | Status: SHIPPED | OUTPATIENT
Start: 2021-01-02 | End: 2021-02-09

## 2021-01-02 RX ADMIN — DEXAMETHASONE SODIUM PHOSPHATE 10 MG: 10 INJECTION, SOLUTION INTRAMUSCULAR; INTRAVENOUS at 14:41

## 2021-01-02 RX ADMIN — ALBUTEROL SULFATE 8 PUFF: 108 AEROSOL, METERED RESPIRATORY (INHALATION) at 14:16

## 2021-01-02 NOTE — ED PROVIDER NOTES
Patient is an 80-year-old with a history of dementia who was sent in for shortness of breath. Patient reports that she feels like her throat is tight but she denies any choking episodes, fevers, and sore throat. She reports a recent cough but does not feel like the shortness of breath is coming from her chest.  It is unknown whether or not the patient has had fevers. The history is provided by the patient. The history is limited by the condition of the patient. Cough  The cough is non-productive. There has been no fever. Associated symptoms include shortness of breath. Pertinent negatives include no chest pain, no chills and no sore throat. She has tried nothing for the symptoms. Her past medical history does not include bronchitis, pneumonia, COPD or asthma. Past Medical History:   Diagnosis Date    Alcoholism ([facility] [address] 11/10/2011    Arthritis     Dementia 11/10/2011    Dementia     Depression 11/10/2011    Diverticulosis 1/27/2014    HTN (hypertension) 6/18/2012    Hyperlipidemia 6/24/2012    Hypertension     Hypovitaminosis D 12/17/2013    Intraventricular hemorrhage, nontraumatic ([facility] [address] 6/18/2012    Mass of pancreas 1/27/2014    1.5 CM DENSITY POST HEAD OF PANCREAS ON CT 1/27/2014    Osteoporosis 12/16/2013    Other ill-defined conditions(799.89)     broken right shoulder    Stroke [facility])        Past Surgical History:   Procedure Laterality Date    HX APPENDECTOMY      HX CATARACT REMOVAL      HX CHOLECYSTECTOMY      HX HYSTERECTOMY      HX ORTHOPAEDIC      repair lt shoulder fx         No family history on file.     Social History     Socioeconomic History    Marital status:      Spouse name: Not on file    Number of children: Not on file    Years of education: Not on file    Highest education level: Not on file   Occupational History    Not on file   Social Needs    Financial resource strain: Not on file    Food insecurity     Worry: Not on file     Inability: Not on file    Transportation needs     Medical: Not on file     Non-medical: Not on file   Tobacco Use    Smoking status: Former Smoker     Packs/day: 1.00     Years: 50.00     Pack years: 50.00   Substance and Sexual Activity    Alcohol use: Yes     Comment: dtrs state amount unknown    Drug use: No    Sexual activity: Not on file   Lifestyle    Physical activity     Days per week: Not on file     Minutes per session: Not on file    Stress: Not on file   Relationships    Social connections     Talks on phone: Not on file     Gets together: Not on file     Attends [employer] service: Not on file     Active member of club or organization: Not on file     Attends meetings of clubs or organizations: Not on file     Relationship status: Not on file    Intimate partner violence     Fear of current or ex partner: Not on file     Emotionally abused: Not on file     Physically abused: Not on file     Forced sexual activity: Not on file   Other Topics Concern    Not on file   Social History Narrative    ** Merged History Encounter **              ALLERGIES: Codeine, Ibuprofen, and Pcn [penicillins]    Review of Systems   Unable to perform ROS: Dementia   Constitutional: Negative for chills. HENT: Negative for sore throat. Respiratory: Positive for cough and shortness of breath. Cardiovascular: Negative for chest pain. Vitals:    01/02/21 1330   BP: (!) 107/53   Pulse: 91   Resp: 22   Temp: 97.6 °F (36.4 °C)   SpO2: 97%            Physical Exam  Vitals signs and nursing note reviewed. Constitutional:       Appearance: Normal appearance. She is well-developed. HENT:      Head: Normocephalic and atraumatic. Eyes:      Pupils: Pupils are equal, round, and reactive to light. Neck:      Musculoskeletal: Normal range of motion and neck supple. Cardiovascular:      Rate and Rhythm: Normal rate and regular rhythm. Pulmonary:      Effort: Pulmonary effort is normal. No respiratory distress.       Breath sounds: Normal breath sounds. Stridor present. Abdominal:      General: There is no distension. Palpations: Abdomen is soft. Tenderness: There is no abdominal tenderness. Musculoskeletal:      Right lower leg: No edema. Left lower leg: No edema. Skin:     General: Skin is warm and dry. Capillary Refill: Capillary refill takes less than 2 seconds. Neurological:      General: No focal deficit present. Mental Status: She is alert. She is disoriented. Psychiatric:         Mood and Affect: Mood normal.         Behavior: Behavior normal.          MDM  Number of Diagnoses or Management Options  SOB (shortness of breath)  Diagnosis management comments: 80-year-old woman presents to the emergency department with shortness of breath and wheezing. On exam the wheezing is consistent with transmitted upper airway sounds and patient reports improved symptoms after dexamethasone and albuterol. Patient has normal vital signs and work-up was negative for airway obstruction, pharyngitis, pneumonia, pneumothorax, respiratory failure. Procedures        Noni Bamberger was evaluated in the Emergency Department on 1/2/2021 for the symptoms described in the history of present illness. He/she was evaluated in the context of the global COVID-19 pandemic, which necessitated consideration that the patient might be at risk for infection with the SARS-CoV-2 virus that causes COVID-19. Institutional protocols and algorithms that pertain to the evaluation of patients at risk for COVID-19 are in a state of rapid change based on information released by regulatory bodies including the CDC and federal and state organizations. These policies and algorithms were followed during the patient's care in the ED.     Surrogate Decision Maker (Who do you want to make decisions for you in the event you are not able to?): Extended Emergency Contact Information  Primary Emergency Contact: Prince Hayden  Address: [address], [address] [place] [address] Phone: [phone]  Relation: Spouse  Secondary Emergency Contact: Claudia Kuo  Address: [address]           Alyse, [address] [place] [address] Phone: [phone]  Mobile Phone: [phone]  Relation: Caregiver

## 2021-01-02 NOTE — ED NOTES
Triage Note: Patient is coming in for cough, wheezing and wants to be checked for UTI. Aide that is with the patient dropped her off then left. Patient is alert and oriented x 1. Patient is audibly wheezing in triage.

## 2021-01-03 LAB
HEALTH STATUS, XMCV2T: NORMAL
SARS-COV-2, COV2: NOT DETECTED
SOURCE, COVRS: NORMAL
SPECIMEN SOURCE, FCOV2M: NORMAL
SPECIMEN TYPE, XMCV1T: NORMAL

## 2021-02-10 ENCOUNTER — APPOINTMENT (OUTPATIENT)
Dept: GENERAL RADIOLOGY | Age: 86
DRG: 194 | End: 2021-02-10
Attending: STUDENT IN AN ORGANIZED HEALTH CARE EDUCATION/TRAINING PROGRAM
Payer: MEDICARE

## 2021-02-10 ENCOUNTER — APPOINTMENT (OUTPATIENT)
Dept: CT IMAGING | Age: 86
DRG: 194 | End: 2021-02-10
Attending: STUDENT IN AN ORGANIZED HEALTH CARE EDUCATION/TRAINING PROGRAM
Payer: MEDICARE

## 2021-02-10 ENCOUNTER — HOSPITAL ENCOUNTER (INPATIENT)
Age: 86
LOS: 7 days | Discharge: HOME HEALTH | DRG: 194 | End: 2021-02-17
Attending: STUDENT IN AN ORGANIZED HEALTH CARE EDUCATION/TRAINING PROGRAM | Admitting: HOSPITALIST
Payer: MEDICARE

## 2021-02-10 DIAGNOSIS — R06.2 WHEEZING: ICD-10-CM

## 2021-02-10 DIAGNOSIS — R06.02 SOB (SHORTNESS OF BREATH): Primary | ICD-10-CM

## 2021-02-10 DIAGNOSIS — J18.9 PNEUMONIA DUE TO INFECTIOUS ORGANISM, UNSPECIFIED LATERALITY, UNSPECIFIED PART OF LUNG: ICD-10-CM

## 2021-02-10 LAB
ALBUMIN SERPL-MCNC: 3.4 G/DL (ref 3.5–5)
ALBUMIN/GLOB SERPL: 0.7 {RATIO} (ref 1.1–2.2)
ALP SERPL-CCNC: 121 U/L (ref 45–117)
ALT SERPL-CCNC: 24 U/L (ref 12–78)
ANION GAP SERPL CALC-SCNC: 5 MMOL/L (ref 5–15)
APPEARANCE UR: CLEAR
AST SERPL-CCNC: 20 U/L (ref 15–37)
ATRIAL RATE: 93 BPM
BACTERIA URNS QL MICRO: NEGATIVE /HPF
BASOPHILS # BLD: 0.1 K/UL (ref 0–0.1)
BASOPHILS NFR BLD: 1 % (ref 0–1)
BILIRUB SERPL-MCNC: 0.3 MG/DL (ref 0.2–1)
BILIRUB UR QL: NEGATIVE
BUN SERPL-MCNC: 21 MG/DL (ref 6–20)
BUN/CREAT SERPL: 19 (ref 12–20)
CALCIUM SERPL-MCNC: 9.9 MG/DL (ref 8.5–10.1)
CALCULATED P AXIS, ECG09: 99 DEGREES
CALCULATED R AXIS, ECG10: -33 DEGREES
CALCULATED T AXIS, ECG11: 104 DEGREES
CHLORIDE SERPL-SCNC: 108 MMOL/L (ref 97–108)
CO2 SERPL-SCNC: 25 MMOL/L (ref 21–32)
COLOR UR: ABNORMAL
COMMENT, HOLDF: NORMAL
CREAT SERPL-MCNC: 1.12 MG/DL (ref 0.55–1.02)
DIAGNOSIS, 93000: NORMAL
DIFFERENTIAL METHOD BLD: ABNORMAL
EOSINOPHIL # BLD: 0.6 K/UL (ref 0–0.4)
EOSINOPHIL NFR BLD: 6 % (ref 0–7)
EPITH CASTS URNS QL MICRO: ABNORMAL /LPF
ERYTHROCYTE [DISTWIDTH] IN BLOOD BY AUTOMATED COUNT: 13.7 % (ref 11.5–14.5)
GLOBULIN SER CALC-MCNC: 5 G/DL (ref 2–4)
GLUCOSE SERPL-MCNC: 112 MG/DL (ref 65–100)
GLUCOSE UR STRIP.AUTO-MCNC: NEGATIVE MG/DL
HCT VFR BLD AUTO: 40 % (ref 35–47)
HGB BLD-MCNC: 12.5 G/DL (ref 11.5–16)
HGB UR QL STRIP: NEGATIVE
HYALINE CASTS URNS QL MICRO: ABNORMAL /LPF (ref 0–5)
IMM GRANULOCYTES # BLD AUTO: 0.1 K/UL (ref 0–0.04)
IMM GRANULOCYTES NFR BLD AUTO: 1 % (ref 0–0.5)
KETONES UR QL STRIP.AUTO: NEGATIVE MG/DL
LEUKOCYTE ESTERASE UR QL STRIP.AUTO: ABNORMAL
LYMPHOCYTES # BLD: 1.9 K/UL (ref 0.8–3.5)
LYMPHOCYTES NFR BLD: 20 % (ref 12–49)
MCH RBC QN AUTO: 29.4 PG (ref 26–34)
MCHC RBC AUTO-ENTMCNC: 31.3 G/DL (ref 30–36.5)
MCV RBC AUTO: 94.1 FL (ref 80–99)
MONOCYTES # BLD: 0.9 K/UL (ref 0–1)
MONOCYTES NFR BLD: 10 % (ref 5–13)
NEUTS SEG # BLD: 6.1 K/UL (ref 1.8–8)
NEUTS SEG NFR BLD: 62 % (ref 32–75)
NITRITE UR QL STRIP.AUTO: NEGATIVE
NRBC # BLD: 0 K/UL (ref 0–0.01)
NRBC BLD-RTO: 0 PER 100 WBC
P-R INTERVAL, ECG05: 138 MS
PH UR STRIP: 5 [PH] (ref 5–8)
PLATELET # BLD AUTO: 220 K/UL (ref 150–400)
PMV BLD AUTO: 11 FL (ref 8.9–12.9)
POTASSIUM SERPL-SCNC: 4.2 MMOL/L (ref 3.5–5.1)
PROT SERPL-MCNC: 8.4 G/DL (ref 6.4–8.2)
PROT UR STRIP-MCNC: ABNORMAL MG/DL
Q-T INTERVAL, ECG07: 362 MS
QRS DURATION, ECG06: 70 MS
QTC CALCULATION (BEZET), ECG08: 450 MS
RBC # BLD AUTO: 4.25 M/UL (ref 3.8–5.2)
RBC #/AREA URNS HPF: ABNORMAL /HPF (ref 0–5)
SAMPLES BEING HELD,HOLD: NORMAL
SODIUM SERPL-SCNC: 138 MMOL/L (ref 136–145)
SP GR UR REFRACTOMETRY: 1.02 (ref 1–1.03)
TROPONIN I SERPL-MCNC: <0.05 NG/ML
UR CULT HOLD, URHOLD: NORMAL
UROBILINOGEN UR QL STRIP.AUTO: 0.2 EU/DL (ref 0.2–1)
VENTRICULAR RATE, ECG03: 93 BPM
WBC # BLD AUTO: 9.7 K/UL (ref 3.6–11)
WBC URNS QL MICRO: ABNORMAL /HPF (ref 0–4)

## 2021-02-10 PROCEDURE — 71045 X-RAY EXAM CHEST 1 VIEW: CPT

## 2021-02-10 PROCEDURE — 74018 RADEX ABDOMEN 1 VIEW: CPT

## 2021-02-10 PROCEDURE — 99285 EMERGENCY DEPT VISIT HI MDM: CPT

## 2021-02-10 PROCEDURE — 87086 URINE CULTURE/COLONY COUNT: CPT

## 2021-02-10 PROCEDURE — 74011000250 HC RX REV CODE- 250: Performed by: STUDENT IN AN ORGANIZED HEALTH CARE EDUCATION/TRAINING PROGRAM

## 2021-02-10 PROCEDURE — 74011000258 HC RX REV CODE- 258: Performed by: STUDENT IN AN ORGANIZED HEALTH CARE EDUCATION/TRAINING PROGRAM

## 2021-02-10 PROCEDURE — 80053 COMPREHEN METABOLIC PANEL: CPT

## 2021-02-10 PROCEDURE — 85025 COMPLETE CBC W/AUTO DIFF WBC: CPT

## 2021-02-10 PROCEDURE — 81001 URINALYSIS AUTO W/SCOPE: CPT

## 2021-02-10 PROCEDURE — 65270000029 HC RM PRIVATE

## 2021-02-10 PROCEDURE — 71250 CT THORAX DX C-: CPT

## 2021-02-10 PROCEDURE — 74011250636 HC RX REV CODE- 250/636: Performed by: STUDENT IN AN ORGANIZED HEALTH CARE EDUCATION/TRAINING PROGRAM

## 2021-02-10 PROCEDURE — 93005 ELECTROCARDIOGRAM TRACING: CPT

## 2021-02-10 PROCEDURE — 94640 AIRWAY INHALATION TREATMENT: CPT

## 2021-02-10 PROCEDURE — 36415 COLL VENOUS BLD VENIPUNCTURE: CPT

## 2021-02-10 PROCEDURE — 84484 ASSAY OF TROPONIN QUANT: CPT

## 2021-02-10 RX ORDER — SODIUM CHLORIDE 0.9 % (FLUSH) 0.9 %
5-40 SYRINGE (ML) INJECTION EVERY 8 HOURS
Status: DISCONTINUED | OUTPATIENT
Start: 2021-02-10 | End: 2021-02-17 | Stop reason: HOSPADM

## 2021-02-10 RX ORDER — IPRATROPIUM BROMIDE AND ALBUTEROL SULFATE 2.5; .5 MG/3ML; MG/3ML
3 SOLUTION RESPIRATORY (INHALATION)
Status: DISCONTINUED | OUTPATIENT
Start: 2021-02-11 | End: 2021-02-12

## 2021-02-10 RX ORDER — ENOXAPARIN SODIUM 100 MG/ML
30 INJECTION SUBCUTANEOUS DAILY
Status: DISCONTINUED | OUTPATIENT
Start: 2021-02-11 | End: 2021-02-12 | Stop reason: DRUGHIGH

## 2021-02-10 RX ORDER — ACETAMINOPHEN 325 MG/1
650 TABLET ORAL
Status: DISCONTINUED | OUTPATIENT
Start: 2021-02-10 | End: 2021-02-17 | Stop reason: HOSPADM

## 2021-02-10 RX ORDER — POLYETHYLENE GLYCOL 3350 17 G/17G
17 POWDER, FOR SOLUTION ORAL DAILY PRN
Status: DISCONTINUED | OUTPATIENT
Start: 2021-02-10 | End: 2021-02-17 | Stop reason: HOSPADM

## 2021-02-10 RX ORDER — ONDANSETRON 2 MG/ML
4 INJECTION INTRAMUSCULAR; INTRAVENOUS
Status: DISCONTINUED | OUTPATIENT
Start: 2021-02-10 | End: 2021-02-17 | Stop reason: HOSPADM

## 2021-02-10 RX ORDER — PROMETHAZINE HYDROCHLORIDE 25 MG/1
12.5 TABLET ORAL
Status: DISCONTINUED | OUTPATIENT
Start: 2021-02-10 | End: 2021-02-17 | Stop reason: HOSPADM

## 2021-02-10 RX ORDER — ALBUTEROL SULFATE 0.83 MG/ML
2.5 SOLUTION RESPIRATORY (INHALATION)
Status: COMPLETED | OUTPATIENT
Start: 2021-02-10 | End: 2021-02-10

## 2021-02-10 RX ORDER — GUAIFENESIN/DEXTROMETHORPHAN 100-10MG/5
5 SYRUP ORAL
Status: DISCONTINUED | OUTPATIENT
Start: 2021-02-10 | End: 2021-02-17 | Stop reason: HOSPADM

## 2021-02-10 RX ORDER — ALBUTEROL SULFATE 0.83 MG/ML
2.5 SOLUTION RESPIRATORY (INHALATION)
Status: DISCONTINUED | OUTPATIENT
Start: 2021-02-10 | End: 2021-02-17 | Stop reason: HOSPADM

## 2021-02-10 RX ORDER — DEXAMETHASONE 4 MG/1
6 TABLET ORAL DAILY
Status: DISCONTINUED | OUTPATIENT
Start: 2021-02-10 | End: 2021-02-10

## 2021-02-10 RX ORDER — ACETAMINOPHEN 650 MG/1
650 SUPPOSITORY RECTAL
Status: DISCONTINUED | OUTPATIENT
Start: 2021-02-10 | End: 2021-02-17 | Stop reason: HOSPADM

## 2021-02-10 RX ORDER — LISINOPRIL 10 MG/1
5 TABLET ORAL DAILY
Status: DISCONTINUED | OUTPATIENT
Start: 2021-02-11 | End: 2021-02-10

## 2021-02-10 RX ORDER — ESCITALOPRAM OXALATE 10 MG/1
10 TABLET ORAL DAILY
Status: DISCONTINUED | OUTPATIENT
Start: 2021-02-11 | End: 2021-02-17 | Stop reason: HOSPADM

## 2021-02-10 RX ORDER — SODIUM CHLORIDE 0.9 % (FLUSH) 0.9 %
5-40 SYRINGE (ML) INJECTION AS NEEDED
Status: DISCONTINUED | OUTPATIENT
Start: 2021-02-10 | End: 2021-02-17 | Stop reason: HOSPADM

## 2021-02-10 RX ADMIN — Medication 10 ML: at 23:47

## 2021-02-10 RX ADMIN — ALBUTEROL SULFATE 2.5 MG: 2.5 SOLUTION RESPIRATORY (INHALATION) at 15:51

## 2021-02-10 RX ADMIN — METHYLPREDNISOLONE SODIUM SUCCINATE 40 MG: 40 INJECTION, POWDER, FOR SOLUTION INTRAMUSCULAR; INTRAVENOUS at 22:32

## 2021-02-10 RX ADMIN — DOXYCYCLINE 100 MG: 100 INJECTION, POWDER, LYOPHILIZED, FOR SOLUTION INTRAVENOUS at 22:32

## 2021-02-10 NOTE — ED PROVIDER NOTES
The patient is an 80-year-old female history of alcoholism, dementia, hypertension and hyperlipidemia presenting today secondary to fatigue and low oxygen saturations with a cough. Patient has history of dementia and is a very poor historian therefore history is limited. I spoke to her nurse that takes care of her at home as well as her  he states that over the past 3 to 4 days she has had increasing fatigue and a cough. When she has coughing spells her oxygen levels drop, as low as 70%. She has had constipation. She was seen by Dr. Jovon Kaplan today who sent her for further evaluation. Full history, physical exam, and ROS unable to be obtained due to:  dementia. Past Medical History:   Diagnosis Date    Alcoholism ([facility] [address] 11/10/2011    Arthritis     Dementia     Dementia ([facility] [address] 11/10/2011    Depression 11/10/2011    Diverticulosis 1/27/2014    HTN (hypertension) 6/18/2012    Hyperlipidemia 6/24/2012    Hypertension     Hypovitaminosis D 12/17/2013    Intraventricular hemorrhage, nontraumatic ([facility] [address] 6/18/2012    Mass of pancreas 1/27/2014    1.5 CM DENSITY POST HEAD OF PANCREAS ON CT 1/27/2014    Osteoporosis 12/16/2013    Other ill-defined conditions(799.89)     broken right shoulder    Stroke [facility])        Past Surgical History:   Procedure Laterality Date    HX APPENDECTOMY      HX CATARACT REMOVAL      HX CHOLECYSTECTOMY      HX HYSTERECTOMY      HX ORTHOPAEDIC      repair lt shoulder fx         History reviewed. No pertinent family history.     Social History     Socioeconomic History    Marital status:      Spouse name: Not on file    Number of children: Not on file    Years of education: Not on file    Highest education level: Not on file   Occupational History    Not on file   Social Needs    Financial resource strain: Not on file    Food insecurity     Worry: Not on file     Inability: Not on file    Transportation needs     Medical: Not on file Non-medical: Not on file   Tobacco Use    Smoking status: Former Smoker     Packs/day: 1.00     Years: 50.00     Pack years: 50.00   Substance and Sexual Activity    Alcohol use: Yes     Comment: dtrs state amount unknown    Drug use: No    Sexual activity: Not on file   Lifestyle    Physical activity     Days per week: Not on file     Minutes per session: Not on file    Stress: Not on file   Relationships    Social connections     Talks on phone: Not on file     Gets together: Not on file     Attends Orthodox service: Not on file     Active member of club or organization: Not on file     Attends meetings of clubs or organizations: Not on file     Relationship status: Not on file    Intimate partner violence     Fear of current or ex partner: Not on file     Emotionally abused: Not on file     Physically abused: Not on file     Forced sexual activity: Not on file   Other Topics Concern    Not on file   Social History Narrative    ** Merged History Encounter **              ALLERGIES: Codeine, Ibuprofen, and Pcn [penicillins]    Review of Systems   Unable to perform ROS: Dementia       Vitals:    02/10/21 1255 02/10/21 1530   BP: 121/78 (!) 152/73   Pulse: 94    Resp: 16    Temp: 97.6 °F (36.4 °C)    SpO2: 95% 96%   Weight: 63.8 kg (140 lb 10.5 oz)             Physical Exam  Vitals signs and nursing note reviewed. Constitutional:       General: She is not in acute distress. Appearance: She is well-developed. She is not diaphoretic. HENT:      Head: Normocephalic. Mouth/Throat:      Pharynx: No oropharyngeal exudate. Eyes:      General:         Right eye: No discharge. Left eye: No discharge. Pupils: Pupils are equal, round, and reactive to light. Neck:      Musculoskeletal: Normal range of motion and neck supple. Cardiovascular:      Rate and Rhythm: Normal rate and regular rhythm. Heart sounds: Normal heart sounds. No murmur. No friction rub. No gallop.     Pulmonary: Effort: Pulmonary effort is normal. No respiratory distress. Breath sounds: No stridor. Wheezing (expiratory) present. No rales. Abdominal:      General: Bowel sounds are normal. There is no distension. Palpations: Abdomen is soft. Tenderness: There is no abdominal tenderness. There is no guarding or rebound. Musculoskeletal: Normal range of motion. General: No deformity. Skin:     General: Skin is warm and dry. Capillary Refill: Capillary refill takes less than 2 seconds. Findings: No rash. Neurological:      Mental Status: She is alert. Comments: Confused but follows commands   Psychiatric:         Behavior: Behavior normal.         Labs Reviewed:   UA with questionable evidence of UTI  No leukocytosis  No anemia  Renal function acceptable  Electrolytes okay  Blood sugar okay    Imaging Reviewed:   Chest x-ray was negative  CT shows mucus in the trachea, bronchial wall thickening with lower lobe mucous plugging, right lung base groundglass opacity, left lung base opacity      Course:  DuoNeb ordered for wheezing  IV doxycycline ordered    I discussed with her PCP who agreed with admission. She was tachypneic in the office and wheezing. Perfect Serve Consult for Admission  8:27 PM    ED Room Number: ER24/24  Patient Name and age:  Milagro Lovett 80 y.o.  female  Working Diagnosis:   1. SOB (shortness of breath)    2. Wheezing    3. Pneumonia due to infectious organism, unspecified laterality, unspecified part of lung        COVID-19 Suspicion:  yes  Sepsis present:  no  Reassessment needed: no  Code Status:  Full Code  Readmission: no  Isolation Requirements:  no  Recommended Level of Care:  med/surg  Department:[facility] Adult ED - 21   Other:  80 y.o. female coming with sob, wheezing, hypoxic to 70's at home (ok here), has PNA. Progressive fatigue. Starting steroids for wheezing, got neb. D/w Dr. Manas Tejada who will take over care tomorrow.        MDM: 59-year-old female presenting today with hypoxic episodes at home, cough and progressive fatigue. Seen by PCP today who found her to be tachypneic and wheezing. Here she was given a albuterol treatment. She was also given IV antibiotics for what looks like pneumonia. Covid test sent. Vital signs stable overall. Will admit for further management and work-up. Clinical Impression:     ICD-10-CM ICD-9-CM    1. SOB (shortness of breath)  R06.02 786.05    2. Wheezing  R06.2 786.07    3.  Pneumonia due to infectious organism, unspecified laterality, unspecified part of lung  J18.9 486            Disposition: [facility], DO

## 2021-02-10 NOTE — ED TRIAGE NOTES
Pt brought in with care giver who stated yesterday her oxygen sats dropped to 70's , +lethargic, may be constipated too, +cough, denies n/v

## 2021-02-11 LAB
CRP SERPL-MCNC: 1.78 MG/DL (ref 0–0.6)
PROCALCITONIN SERPL-MCNC: 0.08 NG/ML
SARS-COV-2, COV2: NORMAL
SARS-COV-2, XPLCVT: NOT DETECTED
SOURCE, COVRS: NORMAL

## 2021-02-11 PROCEDURE — 74011000258 HC RX REV CODE- 258: Performed by: HOSPITALIST

## 2021-02-11 PROCEDURE — U0003 INFECTIOUS AGENT DETECTION BY NUCLEIC ACID (DNA OR RNA); SEVERE ACUTE RESPIRATORY SYNDROME CORONAVIRUS 2 (SARS-COV-2) (CORONAVIRUS DISEASE [COVID-19]), AMPLIFIED PROBE TECHNIQUE, MAKING USE OF HIGH THROUGHPUT TECHNOLOGIES AS DESCRIBED BY CMS-2020-01-R: HCPCS

## 2021-02-11 PROCEDURE — 82785 ASSAY OF IGE: CPT

## 2021-02-11 PROCEDURE — 74011250637 HC RX REV CODE- 250/637: Performed by: INTERNAL MEDICINE

## 2021-02-11 PROCEDURE — 74011250636 HC RX REV CODE- 250/636: Performed by: HOSPITALIST

## 2021-02-11 PROCEDURE — 86140 C-REACTIVE PROTEIN: CPT

## 2021-02-11 PROCEDURE — 65270000029 HC RM PRIVATE

## 2021-02-11 PROCEDURE — 94640 AIRWAY INHALATION TREATMENT: CPT

## 2021-02-11 PROCEDURE — 92610 EVALUATE SWALLOWING FUNCTION: CPT | Performed by: SPEECH-LANGUAGE PATHOLOGIST

## 2021-02-11 PROCEDURE — 77030029684 HC NEB SM VOL KT MONA -A

## 2021-02-11 PROCEDURE — 36415 COLL VENOUS BLD VENIPUNCTURE: CPT

## 2021-02-11 PROCEDURE — 87040 BLOOD CULTURE FOR BACTERIA: CPT

## 2021-02-11 PROCEDURE — 84145 PROCALCITONIN (PCT): CPT

## 2021-02-11 PROCEDURE — 94664 DEMO&/EVAL PT USE INHALER: CPT

## 2021-02-11 PROCEDURE — 86003 ALLG SPEC IGE CRUDE XTRC EA: CPT

## 2021-02-11 PROCEDURE — 74011000250 HC RX REV CODE- 250: Performed by: HOSPITALIST

## 2021-02-11 PROCEDURE — 74011250636 HC RX REV CODE- 250/636: Performed by: INTERNAL MEDICINE

## 2021-02-11 RX ORDER — AMLODIPINE BESYLATE 5 MG/1
2.5 TABLET ORAL DAILY
Status: DISCONTINUED | OUTPATIENT
Start: 2021-02-11 | End: 2021-02-14

## 2021-02-11 RX ORDER — BUDESONIDE 0.5 MG/2ML
500 INHALANT ORAL
Status: DISCONTINUED | OUTPATIENT
Start: 2021-02-11 | End: 2021-02-17 | Stop reason: HOSPADM

## 2021-02-11 RX ADMIN — DOXYCYCLINE 100 MG: 100 INJECTION, POWDER, LYOPHILIZED, FOR SOLUTION INTRAVENOUS at 23:06

## 2021-02-11 RX ADMIN — ENOXAPARIN SODIUM 30 MG: 30 INJECTION SUBCUTANEOUS at 10:30

## 2021-02-11 RX ADMIN — Medication 10 ML: at 23:06

## 2021-02-11 RX ADMIN — Medication 10 ML: at 02:04

## 2021-02-11 RX ADMIN — AMLODIPINE BESYLATE 2.5 MG: 5 TABLET ORAL at 11:45

## 2021-02-11 RX ADMIN — DOXYCYCLINE 100 MG: 100 INJECTION, POWDER, LYOPHILIZED, FOR SOLUTION INTRAVENOUS at 11:37

## 2021-02-11 RX ADMIN — Medication 10 ML: at 13:43

## 2021-02-11 RX ADMIN — IPRATROPIUM BROMIDE AND ALBUTEROL SULFATE 3 ML: .5; 3 SOLUTION RESPIRATORY (INHALATION) at 13:54

## 2021-02-11 RX ADMIN — METHYLPREDNISOLONE SODIUM SUCCINATE 60 MG: 125 INJECTION, POWDER, FOR SOLUTION INTRAMUSCULAR; INTRAVENOUS at 16:07

## 2021-02-11 NOTE — CONSULTS
Name: [place] [facility]   : 3/16/1931 Admit Date: 2/10/2021   Phone: [phone]  Room: 607/01   PCP: MIGUEL ANGEL Friedman IV, MD  MRN: [id number]   Date: 2021  Code: Full Code        HPI:    12:57 PM       History was obtained from patient and medical records.    I was asked by Efren Garcia MD to see Lili L Catracho in consultation for a chief complaint of hypoxemia.    History of Present Illness: 89 year old female with past medical history as given below who presented to [facility] with increased cough and noted to have hypoxemia in the ED and subsequently admitted. Patient is a poor historian and denies any cough, shortness of breath or fever to me. Per notes she reportedly had cough for the past 4 days and her O2 levels dropped to 70%. She saw her pcp and subsequently was referred to [facility] for admission.    Patient is a former smoker 1 ppd x 50 years.    Ct Chest reviewed - LLL infiltrate vs atelectasis. Mucous in trachea and LLL bronchial thickening.  Eosinophils 6%.    Started on decadron 6 mg iv q 24 and Doxycycline.  PCT 0.08  COVID-19 pending.    Past Medical History:   Diagnosis Date   • Alcoholism (HCC) 11/10/2011   • Arthritis    • Dementia    • Dementia (HCC) 11/10/2011   • Depression 11/10/2011   • Diverticulosis 2014   • HTN (hypertension) 2012   • Hyperlipidemia 2012   • Hypertension    • Hypovitaminosis D 2013   • Intraventricular hemorrhage, nontraumatic (HCC) 2012   • Mass of pancreas 2014    1.5 CM DENSITY POST HEAD OF PANCREAS ON CT 2014   • Osteoporosis 2013   • Other ill-defined conditions(799.89)     broken right shoulder   • Stroke (HCC)        Past Surgical History:   Procedure Laterality Date   • HX APPENDECTOMY     • HX CATARACT REMOVAL     • HX CHOLECYSTECTOMY     • HX HYSTERECTOMY     • HX ORTHOPAEDIC      repair lt shoulder fx       History reviewed. No pertinent family history.    Social History  Tobacco Use    Smoking status: Former Smoker     Packs/day: 1.00     Years: 50.00     Pack years: 50.00   Substance Use Topics    Alcohol use: Yes     Comment: dtrs state amount unknown       Allergies   Allergen Reactions    Codeine Hives     Pt cannot really remember allergy, uncertain of reactioni    Ibuprofen Other (comments)     Abn LFT'S & FEVER    Pcn [Penicillins] Hives       Current Facility-Administered Medications   Medication Dose Route Frequency    amLODIPine (NORVASC) tablet 2.5 mg  2.5 mg Oral DAILY    sodium chloride (NS) flush 5-40 mL  5-40 mL IntraVENous Q8H    sodium chloride (NS) flush 5-40 mL  5-40 mL IntraVENous PRN    acetaminophen (TYLENOL) tablet 650 mg  650 mg Oral Q6H PRN    Or    acetaminophen (TYLENOL) suppository 650 mg  650 mg Rectal Q6H PRN    polyethylene glycol (MIRALAX) packet 17 g  17 g Oral DAILY PRN    promethazine (PHENERGAN) tablet 12.5 mg  12.5 mg Oral Q6H PRN    Or    ondansetron (ZOFRAN) injection 4 mg  4 mg IntraVENous Q6H PRN    enoxaparin (LOVENOX) injection 30 mg  30 mg SubCUTAneous DAILY    escitalopram oxalate (LEXAPRO) tablet 10 mg  10 mg Oral DAILY    albuterol-ipratropium (DUO-NEB) 2.5 MG-0.5 MG/3 ML  3 mL Nebulization QID RT    albuterol (PROVENTIL VENTOLIN) nebulizer solution 2.5 mg  2.5 mg Nebulization Q4H PRN    guaiFENesin-dextromethorphan (ROBITUSSIN DM) 100-10 mg/5 mL syrup 5 mL  5 mL Oral Q4H PRN    dexAMETHasone (DECADRON) tablet 6 mg  6 mg Oral DAILY    doxycycline (VIBRAMYCIN) 100 mg in 0.9% sodium chloride (MBP/ADV) 100 mL MBP  100 mg IntraVENous Q12H         REVIEW OF SYSTEMS   Negative except as stated in the HPI. Physical Exam:   Visit Vitals  BP (!) 150/58 (BP 1 Location: Right upper arm, BP Patient Position: At rest)   Pulse 84   Temp 97.4 °F (36.3 °C)   Resp 16   Wt 63.1 kg (139 lb 1.8 oz)   SpO2 95%   BMI 25.44 kg/m²       General:  Alert, cooperative. Head:  Normocephalic. Eyes:  Conjunctivae/corneas clear. Nose: Nares normal. Septum midline. Throat: Lips, mucosa, and tongue normal. Teeth and gums normal.   Neck: Supple, symmetrical, trachea midline. Lungs:   Decreased air entry at the bases. Heart:  Regular rate and rhythm, S1, S2 normal, no murmur, click, rub or gallop. Abdomen:   Soft, non-tender. Bowel sounds normal. No masses,  No organomegaly. Extremities: No edema. Skin: Skin color, texture, turgor normal. No rashes or lesions       Neurologic: Grossly nonfocal       Lab Results   Component Value Date/Time    Sodium 138 02/10/2021 01:33 PM    Potassium 4.2 02/10/2021 01:33 PM    Chloride 108 02/10/2021 01:33 PM    CO2 25 02/10/2021 01:33 PM    BUN 21 (H) 02/10/2021 01:33 PM    Creatinine 1.12 (H) 02/10/2021 01:33 PM    Glucose 112 (H) 02/10/2021 01:33 PM    Calcium 9.9 02/10/2021 01:33 PM    Magnesium 1.7 01/17/2014 07:04 PM    Phosphorus 3.3 06/19/2012 04:00 AM    Lactic acid 1.2 04/30/2020 12:16 AM       Lab Results   Component Value Date/Time    WBC 9.7 02/10/2021 01:33 PM    HGB 12.5 02/10/2021 01:33 PM    PLATELET 064 06/68/7277 01:33 PM    MCV 94.1 02/10/2021 01:33 PM       Lab Results   Component Value Date/Time    Color YELLOW/STRAW 02/10/2021 03:33 PM    Appearance CLEAR 02/10/2021 03:33 PM    pH (UA) 5.0 02/10/2021 03:33 PM    Protein TRACE (A) 02/10/2021 03:33 PM    Glucose Negative 02/10/2021 03:33 PM    Ketone Negative 02/10/2021 03:33 PM    Bilirubin Negative 02/10/2021 03:33 PM    Blood Negative 02/10/2021 03:33 PM    Urobilinogen 0.2 02/10/2021 03:33 PM    Nitrites Negative 02/10/2021 03:33 PM    Leukocyte Esterase TRACE (A) 02/10/2021 03:33 PM    WBC 5-10 02/10/2021 03:33 PM    RBC 0-5 02/10/2021 03:33 PM    Bacteria Negative 02/10/2021 03:33 PM       IMPRESSION:  ==========  -LLL pneumonia. -Bilateral Lower lobe L > R bronchial wall thickening with mucous with elevated eosinophils - asthma?  -Eosinophilia.   -COPD with 50 pack year smoking hx.  -Former smoker. PLAN:  =====  -Follow COVID-19 pcr. If negative change Decadron to solumedrol.  -agree with a short course of abx.  -send IgE and RAST. -ICS/LABA agent.  -swallow study before discharge. Thank you for the consult.     Terry Montemayor MD

## 2021-02-11 NOTE — PROGRESS NOTES
TRANSFER - IN REPORT:    Verbal report received from Clarence(name) on Lili Hayden  being received from ED(unit) for routine progression of care      Report consisted of patient’s Situation, Background, Assessment and   Recommendations(SBAR).     Information from the following report(s) SBAR, Kardex, ED Summary, MAR and Recent Results was reviewed with the receiving nurse.    Opportunity for questions and clarification was provided.      Assessment completed upon patient’s arrival to unit and care assumed.

## 2021-02-11 NOTE — ROUTINE PROCESS
TRANSFER - OUT REPORT: 
 
Verbal report given to Jazlyn (name) on Garrick Prima  being transferred to 607(unit) for routine progression of care Report consisted of patients Situation, Background, Assessment and  
Recommendations(SBAR). Information from the following report(s) SBAR, ED Summary and Recent Results was reviewed with the receiving nurse. Lines:  
Peripheral IV 02/10/21 Right Forearm (Active) Site Assessment Clean, dry, & intact 02/10/21 1332 Phlebitis Assessment 0 02/10/21 1332 Infiltration Assessment 0 02/10/21 1332 Dressing Status Clean, dry, & intact 02/10/21 1332 Dressing Type Transparent 02/10/21 1332 Hub Color/Line Status Blue 02/10/21 1332 Alcohol Cap Used No 02/10/21 1332 Opportunity for questions and clarification was provided. Patient transported with: 
 [employer]

## 2021-02-11 NOTE — PROGRESS NOTES
Problem: Dysphagia (Adult)  Goal: *Acute Goals and Plan of Care (Insert Text)  Description: Speech therapy goals  Initiated 2/11/2021   1. Patient will tolerate dysphagia 2/nectar thick liquid diet without s/s of aspiration within 7 days   2. Patient will participate in [facility] [address] study for further objective imaging of the swallow within 7 days   Outcome: Progressing Towards Goal     SPEECH [address] EVALUATION  Patient: Kim Mckoy (46 y.o. female)  Date: 2/11/2021  Primary Diagnosis: Pneumonia [J18.9]        Precautions: aspiration       ASSESSMENT :  Based on the objective data described below, the patient presents with suspected at least mild/moderate oropharyngeal dysphagia with delayed and incomplete mastication, decreased bolus formation/control, suspected delayed swallow initiation and reduced hyolaryngeal elevation/excursion via palpation. Patient with incoordinated swallow noted intermittently specifically with thin liquids. Repeated coughing followed by wheezing with ~50% of thin liquid trials. No s/s of aspiration with nectar thick liquids including with large sips (patient impulsive). Will benefit from MBS study to determine extent of aspiration risks and safety to resume thin liquids vs need for continued modified diet. Will tentatively plan for tomorrow now that COVID PCR test is back negative. Patient will benefit from skilled intervention to address the above impairments. Patients rehabilitation potential is considered to be Guarded     PLAN :  Recommendations and Planned Interventions:  --recommend dysphagia 2/nectar(mildly) thick liquid diet for now.   Assistance with intake, encourage slow rate  --agree with Dr. Michel Lynch regarding benefit of MBS study - can tentatively complete tomorrow now that COVID PCR test is back negative (cannot complete while PUI or COVID positive)  Frequency/Duration: Patient will be followed by speech-language pathology 3 times a week to address goals. Discharge Recommendations: To Be Determined     SUBJECTIVE:   Patient stated We need to look at that salmon up there. We can't do anything with that salmon up there. Looking at the ceiling. Patient confused. Difficult to redirect at times as she was fixated on salmon. OBJECTIVE:     Past Medical History:   Diagnosis Date    Alcoholism ([facility] [address] 11/10/2011    Arthritis     Dementia     Dementia ([facility] [address] 11/10/2011    Depression 11/10/2011    Diverticulosis 1/27/2014    HTN (hypertension) 6/18/2012    Hyperlipidemia 6/24/2012    Hypertension     Hypovitaminosis D 12/17/2013    Intraventricular hemorrhage, nontraumatic ([facility] [address] 6/18/2012    Mass of pancreas 1/27/2014    1.5 CM DENSITY POST HEAD OF PANCREAS ON CT 1/27/2014    Osteoporosis 12/16/2013    Other ill-defined conditions(799.89)     broken right shoulder    Stroke [facility])      Past Surgical History:   Procedure Laterality Date    HX APPENDECTOMY      HX CATARACT REMOVAL      HX CHOLECYSTECTOMY      HX HYSTERECTOMY      HX ORTHOPAEDIC      repair lt shoulder fx     Prior Level of Function/Home Situation:      Diet prior to admission: regular  Current Diet:  regular    Cognitive and Communication Status:  Neurologic State: Alert, Confused  Orientation Level: Unable to verbalize(would not verbalize her name, fixated on salmon )  Cognition: Decreased command following, Decreased attention/concentration     Perseveration: Perseverates during conversation  Safety/Judgement: Not assessed  Oral Assessment:  Oral Assessment  Labial: Other (comment)(did not follow commands to assess)  P.O. Trials:  Patient Position: upright in bed   Vocal quality prior to P.O.: No impairment  Consistency Presented: Thin liquid;Puree; Solid; Nectar thick liquid  How Presented: SLP-fed/presented;Spoon;Straw;Successive swallows     Bolus Acceptance: No impairment  Bolus Formation/Control: Impaired  Type of Impairment: Incomplete;Mastication  Propulsion: Delayed (# of seconds)  Oral Residue: None  Initiation of Swallow: Delayed (# of seconds)  Laryngeal Elevation: Decreased  Aspiration Signs/Symptoms: Weak cough(repeated on ~50% thin trials followed by wheeze)  Pharyngeal Phase Characteristics: Double swallow(decreased coordination of the swallow )  Effective Modifications: (nectar thick improved tolerance bedside)  Cues for Modifications: Moderate-maximal  Comments: mental status is a limiting factor in safe PO intake     Oral Phase Severity: Mild-moderate  Pharyngeal Phase Severity : Mild-moderate(suspected )    NOMS:   The NOMS functional outcome measure was used to quantify this patient's level of swallowing impairment. Based on the NOMS, the patient was determined to be at level 3 for swallow function       NOMS Swallowing Levels:  Level 1 (CN): NPO  Level 2 (CM): NPO but takes consistency in therapy  Level 3 (CL): Takes less than 50% of nutrition p.o. and continues with nonoral feedings; and/or safe with mod cues; and/or max diet restriction  Level 4 (CK): Safe swallow but needs mod cues; and/or mod diet restriction; and/or still requires some nonoral feeding/supplements  Level 5 (CJ): Safe swallow with min diet restriction; and/or needs min cues  Level 6 (CI): Independent with p.o.; rare cues; usually self cues; may need to avoid some foods or needs extra time  Level 7 ([address] [place] [address]): Independent for all p.o.  JORY. (2003). National Outcomes Measurement System (NOMS): Adult Speech-Language Pathology User's Guide. Pain:  Pain Scale 1: Numeric (0 - 10)  Pain Intensity 1: 0       After treatment:   Patient left in no apparent distress in bed, Call bell within reach, and Nursing notified    COMMUNICATION/EDUCATION:   The patient's plan of care including recommendations, planned interventions, and recommended diet changes were discussed with: Registered nurse. Patient is unable to participate in goal setting and plan of care.     Thank you for this referral.  Sally Magdaleno, [facility]  CCC-SLP   Time Calculation: 18 mins

## 2021-02-11 NOTE — PROGRESS NOTES
Medical Progress Note      NAME: Bry Berg   :  3/16/1931  MRM:  [id number]    Date/Time: 2021           Problem List:     Active Problems:    Pneumonia (2/10/2021)             Subjective:     PCP    Quiet night. Drank water without cough   She has Dementia and gives limited hx  She denies sob or cough     Past Medical History:   Diagnosis Date    Alcoholism ([address] 11/10/2011    Arthritis     Dementia     Dementia ([facility] 11/10/2011    Depression 11/10/2011    Diverticulosis 2014    HTN (hypertension) 2012    Hyperlipidemia 2012    Hypertension     Hypovitaminosis D 2013    Intraventricular hemorrhage, nontraumatic ([facility] [address] 2012    Mass of pancreas 2014    1.5 CM DENSITY POST HEAD OF PANCREAS ON CT 2014    Osteoporosis 2013    Other ill-defined conditions(799.89)     broken right shoulder    Stroke ([facility] [address]             Objective:         Vitals:      Last 24hrs VS reviewed since prior progress note. Most recent are:    Visit Vitals  BP (!) 165/68 (BP 1 Location: Right upper arm, BP Patient Position: At rest;Lying left side)   Pulse 85   Temp 97.6 °F (36.4 °C)   Resp 16   Wt 139 lb 1.8 oz (63.1 kg)   SpO2 93%   BMI 25.44 kg/m²     SpO2 Readings from Last 6 Encounters:   21 93%   02/10/21 97%   21 99%   21 95%   20 96%   19 97%        No intake or output data in the 24 hours ending 21 8713               Exam:      General:  Alert, cooperative, no distress, appears stated age. Lungs:   E>I b/l wheezes are improved vs yesterday    Heart:  Regular rate and rhythm, S1, S2 normal, no murmur, click, rub or gallop. Abdomen:   Soft, non-tender. Bowel sounds normal. No masses,  No organomegaly.    Extremities:  no pedal edema     Neuro A and O to 's name, Wang, not year        Lab Data Reviewed: (see below)  Recent Results (from the past 24 hour(s))   EKG, 12 LEAD, INITIAL    Collection Time: 02/10/21  1:23 PM Result Value Ref Range    Ventricular Rate 93 BPM    Atrial Rate 93 BPM    P-R Interval 138 ms    QRS Duration 70 ms    Q-T Interval 362 ms    QTC Calculation (Bezet) 450 ms    Calculated P Axis 99 degrees    Calculated R Axis -33 degrees    Calculated T Axis 104 degrees    Diagnosis       Normal sinus rhythm  Left axis deviation  Left ventricular hypertrophy with repolarization abnormality  When compared with ECG of 29-APR-2020 21:16,  No significant change was found  Confirmed by Emily Kemp MD, Chavez ([id number]) on 2/10/2021 2:22:28 PM     CBC WITH AUTOMATED DIFF    Collection Time: 02/10/21  1:33 PM   Result Value Ref Range    WBC 9.7 3.6 - 11.0 K/uL    RBC 4.25 3.80 - 5.20 M/uL    HGB 12.5 11.5 - 16.0 g/dL    HCT 40.0 35.0 - 47.0 %    MCV 94.1 80.0 - 99.0 FL    MCH 29.4 26.0 - 34.0 PG    MCHC 31.3 30.0 - 36.5 g/dL    RDW 13.7 11.5 - 14.5 %    PLATELET 073 419 - 351 K/uL    MPV 11.0 8.9 - 12.9 FL    NRBC 0.0 0  WBC    ABSOLUTE NRBC 0.00 0.00 - 0.01 K/uL    NEUTROPHILS 62 32 - 75 %    LYMPHOCYTES 20 12 - 49 %    MONOCYTES 10 5 - 13 %    EOSINOPHILS 6 0 - 7 %    BASOPHILS 1 0 - 1 %    IMMATURE GRANULOCYTES 1 (H) 0.0 - 0.5 %    ABS. NEUTROPHILS 6.1 1.8 - 8.0 K/UL    ABS. LYMPHOCYTES 1.9 0.8 - 3.5 K/UL    ABS. MONOCYTES 0.9 0.0 - 1.0 K/UL    ABS. EOSINOPHILS 0.6 (H) 0.0 - 0.4 K/UL    ABS. BASOPHILS 0.1 0.0 - 0.1 K/UL    ABS. IMM.  GRANS. 0.1 (H) 0.00 - 0.04 K/UL    DF AUTOMATED     METABOLIC PANEL, COMPREHENSIVE    Collection Time: 02/10/21  1:33 PM   Result Value Ref Range    Sodium 138 136 - 145 mmol/L    Potassium 4.2 3.5 - 5.1 mmol/L    Chloride 108 97 - 108 mmol/L    CO2 25 21 - 32 mmol/L    Anion gap 5 5 - 15 mmol/L    Glucose 112 (H) 65 - 100 mg/dL    BUN 21 (H) 6 - 20 MG/DL    Creatinine 1.12 (H) 0.55 - 1.02 MG/DL    BUN/Creatinine ratio 19 12 - 20      GFR est AA 56 (L) >60 ml/min/1.73m2    GFR est non-AA 46 (L) >60 ml/min/1.73m2    Calcium 9.9 8.5 - 10.1 MG/DL    Bilirubin, total 0.3 0.2 - 1.0 MG/DL    ALT (SGPT) 24 12 - 78 U/L    AST (SGOT) 20 15 - 37 U/L    Alk. phosphatase 121 (H) 45 - 117 U/L    Protein, total 8.4 (H) 6.4 - 8.2 g/dL    Albumin 3.4 (L) 3.5 - 5.0 g/dL    Globulin 5.0 (H) 2.0 - 4.0 g/dL    A-G Ratio 0.7 (L) 1.1 - 2.2     SAMPLES BEING HELD    Collection Time: 02/10/21  1:33 PM   Result Value Ref Range    SAMPLES BEING HELD 1RED     COMMENT        Add-on orders for these samples will be processed based on acceptable specimen integrity and analyte stability, which may vary by analyte. TROPONIN I    Collection Time: 02/10/21  1:33 PM   Result Value Ref Range    Troponin-I, Qt. <0.05 <0.05 ng/mL   URINALYSIS W/MICROSCOPIC    Collection Time: 02/10/21  3:33 PM   Result Value Ref Range    Color YELLOW/STRAW      Appearance CLEAR CLEAR      Specific gravity 1.019 1.003 - 1.030      pH (UA) 5.0 5.0 - 8.0      Protein TRACE (A) NEG mg/dL    Glucose Negative NEG mg/dL    Ketone Negative NEG mg/dL    Bilirubin Negative NEG      Blood Negative NEG      Urobilinogen 0.2 0.2 - 1.0 EU/dL    Nitrites Negative NEG      Leukocyte Esterase TRACE (A) NEG      WBC 5-10 0 - 4 /hpf    RBC 0-5 0 - 5 /hpf    Epithelial cells FEW FEW /lpf    Bacteria Negative NEG /hpf    Hyaline cast 0-2 0 - 5 /lpf   URINE CULTURE HOLD SAMPLE    Collection Time: 02/10/21  3:33 PM    Specimen: Serum; Urine   Result Value Ref Range    Urine culture hold        Urine on hold in Microbiology dept for 2 days. If unpreserved urine is submitted, it cannot be used for addtional testing after 24 hours, recollection will be required.    SARS-COV-2    Collection Time: 02/11/21  1:50 AM   Result Value Ref Range    SARS-CoV-2 Please find results under separate order     PROCALCITONIN    Collection Time: 02/11/21  1:59 AM   Result Value Ref Range    Procalcitonin 0.08 ng/mL       Medications Reviewed: (see below)    ______________________________________________________________________    Medications:     Current Facility-Administered Medications Medication Dose Route Frequency    sodium chloride (NS) flush 5-40 mL  5-40 mL IntraVENous Q8H    sodium chloride (NS) flush 5-40 mL  5-40 mL IntraVENous PRN    acetaminophen (TYLENOL) tablet 650 mg  650 mg Oral Q6H PRN    Or    acetaminophen (TYLENOL) suppository 650 mg  650 mg Rectal Q6H PRN    polyethylene glycol (MIRALAX) packet 17 g  17 g Oral DAILY PRN    promethazine (PHENERGAN) tablet 12.5 mg  12.5 mg Oral Q6H PRN    Or    ondansetron (ZOFRAN) injection 4 mg  4 mg IntraVENous Q6H PRN    enoxaparin (LOVENOX) injection 30 mg  30 mg SubCUTAneous DAILY    escitalopram oxalate (LEXAPRO) tablet 10 mg  10 mg Oral DAILY    albuterol-ipratropium (DUO-NEB) 2.5 MG-0.5 MG/3 ML  3 mL Nebulization QID RT    albuterol (PROVENTIL VENTOLIN) nebulizer solution 2.5 mg  2.5 mg Nebulization Q4H PRN    guaiFENesin-dextromethorphan (ROBITUSSIN DM) 100-10 mg/5 mL syrup 5 mL  5 mL Oral Q4H PRN    dexAMETHasone (DECADRON) tablet 6 mg  6 mg Oral DAILY    doxycycline (VIBRAMYCIN) 100 mg in 0.9% sodium chloride (MBP/ADV) 100 mL MBP  100 mg IntraVENous Q12H                   Assessment:   Pneumonia . R/o Aspiration r/o COVID 19 .  P: Pulm, ENT, Speech Therapy c/s  Continue Doxycycline , Dexamethasone, Duo Neb. Dementia     HTN. Lisinopril stopped 3 d ago.  Will add Norvasc     Patient Active Problem List   Diagnosis Code    Right hip pain M25.551    Dementia ([facility] [address] F03.90    Depression F32.9    Intraventricular hemorrhage, nontraumatic (HCC) I61.5    HTN (hypertension) I10    Hyperlipidemia E78.5    Alkaline phosphatase elevation R74.8    Altered mental status R41.82    Low back pain M54.5    Osteoporosis M81.0    Hypovitaminosis D E55.9    GI bleed K92.2    Rectal bleeding K62.5    Mass of pancreas K86.89    Diverticulosis K57.90    Pneumonia J18.9          Plan:     Continue care.                                                       ___________________________________________________      Attending Physician: Nelli Whitt MD

## 2021-02-11 NOTE — H&P
HISTORY AND PHYSICAL      PCP: Bouchra Sanon MD  History source: ER report, the patient is a poor historian      CC: fatigue and low oxygen levels      HPI: 80 y.o lady with dementia, HTN, hyperlipidemia, CVA, who presents with cough and reported hypoxia. She is a poor historian and does not offer acute complaints besides not feeling well. She was reportedly more fatigued than usual over the past 4 days and has had a cough. Her oxygen levels were noted to drop as low as 70%. She saw her PCP and was sent to the ED for further evaluation. When asked about dyspnea or coughing, the patient denies these symptoms. PMH/PSH:  Past Medical History:   Diagnosis Date    Alcoholism ([facility] [address] 11/10/2011    Arthritis     Dementia     Dementia ([facility] [address] 11/10/2011    Depression 11/10/2011    Diverticulosis 1/27/2014    HTN (hypertension) 6/18/2012    Hyperlipidemia 6/24/2012    Hypertension     Hypovitaminosis D 12/17/2013    Intraventricular hemorrhage, nontraumatic ([facility] [address] 6/18/2012    Mass of pancreas 1/27/2014    1.5 CM DENSITY POST HEAD OF PANCREAS ON CT 1/27/2014    Osteoporosis 12/16/2013    Other ill-defined conditions(799.89)     broken right shoulder    Stroke [facility])      Past Surgical History:   Procedure Laterality Date    HX APPENDECTOMY      HX CATARACT REMOVAL      HX CHOLECYSTECTOMY      HX HYSTERECTOMY      HX ORTHOPAEDIC      repair lt shoulder fx       Home meds:   Prior to Admission medications    Medication Sig Start Date End Date Taking? Authorizing Provider   albuterol (PROVENTIL VENTOLIN) 2.5 mg /3 mL (0.083 %) nebu USE 1 VIAL VIA NEBULIZER EVERY 4 HOURS AS NEEDED FOR WHEEZING 2/9/21   Elvira Hernández MD   albuterol (Ventolin HFA) 90 mcg/actuation inhaler INHALE 2 PUFFS EVERY 4 HOURS AS NEEDED FOR WHEEZING 1/2/21   Tigre Perkins MD   inhalational spacing device 1 Each by Does Not Apply route as needed (wheezing).  1/2/21   Tigre Perkins MD   levoFLOXacin (LEVAQUIN) 500 mg tablet Take 1 Tab by mouth daily. 12/15/20   Miranda Mahoney MD   escitalopram oxalate (LEXAPRO) 10 mg tablet TAKE ONE TABLET BY MOUTH EVERY DAY 11/19/20   Miranda Mahoney MD   cholestyramine-sucrose 4 gram powder DISSOLVE 1 LEVEL SCOOPFUL IN 4 TO 6 OUNCES OF WATER OR OTHER BEVERAGEAND DRINK, TWICE DAILY WITH MEALS 10/8/20   Miranda Mahoney MD   lisinopriL (PRINIVIL, ZESTRIL) 5 mg tablet TAKE ONE TABLET BY MOUTH EVERY DAY 9/20/20   Miranda Mahoney MD   nystatin (Nyamyc) powder APPLY TO AFFECTED AREA 4 TIMES A DAY. 9/4/20   Miranda Mahoney MD   Cjbdtqn-Exhbyqvktbh-MP-Acetam (NYQUIL D) 6.25-30- mg/15 mL liqd Take 30 mL by mouth every four to six (4-6) hours as needed. 12/28/18   Davidson Claudio MD   nystatin (MYCOSTATIN) powder Apply  to affected area four (4) times daily. 9/14/17   Davidson Claudio MD   acetaminophen (TYLENOL) 325 mg tablet Take  by mouth every four (4) hours as needed for Pain. Provider, Historical   cholecalciferol, vitamin D3, (VITAMIN D3) 2,000 unit tab Take 1 Tab by mouth daily. Provider, Historical       Allergies: Allergies   Allergen Reactions    Codeine Hives     Pt cannot really remember allergy, uncertain of reactioni    Ibuprofen Other (comments)     Abn LFT'S & FEVER    Pcn [Penicillins] Hives       FH:  History reviewed. No pertinent family history. SH:  Social History     Tobacco Use    Smoking status: Former Smoker     Packs/day: 1.00     Years: 50.00     Pack years: 50.00   Substance Use Topics    Alcohol use: Yes     Comment: dtrs state amount unknown       ROS: Review of systems not obtained due to patient factors.       PHYSICAL EXAM:  Visit Vitals  BP (!) 130/51   Pulse 94   Temp 97.6 °F (36.4 °C)   Resp 16   Wt 63.8 kg (140 lb 10.5 oz)   SpO2 94%   BMI 25.73 kg/m²       Gen: NAD  HEENT: anicteric sclerae, normal conjunctiva  Neck: supple, trachea midline, no adenopathy  Heart: RRR, no MRG, no JVD, no peripheral edema  Lungs: bilateral wheezing, non-labored respirations  Abd: soft, NT, ND, BS+  Extr: warm  Skin: dry, no rash  Neuro: CN II-XII grossly intact, normal speech, moves all extremities  Psych: flat affect      Labs/Imaging:  Recent Results (from the past 24 hour(s))   EKG, 12 LEAD, INITIAL    Collection Time: 02/10/21  1:23 PM   Result Value Ref Range    Ventricular Rate 93 BPM    Atrial Rate 93 BPM    P-R Interval 138 ms    QRS Duration 70 ms    Q-T Interval 362 ms    QTC Calculation (Bezet) 450 ms    Calculated P Axis 99 degrees    Calculated R Axis -33 degrees    Calculated T Axis 104 degrees    Diagnosis       Normal sinus rhythm  Left axis deviation  Left ventricular hypertrophy with repolarization abnormality  When compared with ECG of 29-APR-2020 21:16,  No significant change was found  Confirmed by Enriqueta Bazan MD, Chavez ([id number]) on 2/10/2021 2:22:28 PM     CBC WITH AUTOMATED DIFF    Collection Time: 02/10/21  1:33 PM   Result Value Ref Range    WBC 9.7 3.6 - 11.0 K/uL    RBC 4.25 3.80 - 5.20 M/uL    HGB 12.5 11.5 - 16.0 g/dL    HCT 40.0 35.0 - 47.0 %    MCV 94.1 80.0 - 99.0 FL    MCH 29.4 26.0 - 34.0 PG    MCHC 31.3 30.0 - 36.5 g/dL    RDW 13.7 11.5 - 14.5 %    PLATELET 789 465 - 810 K/uL    MPV 11.0 8.9 - 12.9 FL    NRBC 0.0 0  WBC    ABSOLUTE NRBC 0.00 0.00 - 0.01 K/uL    NEUTROPHILS 62 32 - 75 %    LYMPHOCYTES 20 12 - 49 %    MONOCYTES 10 5 - 13 %    EOSINOPHILS 6 0 - 7 %    BASOPHILS 1 0 - 1 %    IMMATURE GRANULOCYTES 1 (H) 0.0 - 0.5 %    ABS. NEUTROPHILS 6.1 1.8 - 8.0 K/UL    ABS. LYMPHOCYTES 1.9 0.8 - 3.5 K/UL    ABS. MONOCYTES 0.9 0.0 - 1.0 K/UL    ABS. EOSINOPHILS 0.6 (H) 0.0 - 0.4 K/UL    ABS. BASOPHILS 0.1 0.0 - 0.1 K/UL    ABS. IMM.  GRANS. 0.1 (H) 0.00 - 0.04 K/UL    DF AUTOMATED     METABOLIC PANEL, COMPREHENSIVE    Collection Time: 02/10/21  1:33 PM   Result Value Ref Range    Sodium 138 136 - 145 mmol/L    Potassium 4.2 3.5 - 5.1 mmol/L    Chloride 108 97 - 108 mmol/L    CO2 25 21 - 32 mmol/L    Anion gap 5 5 - 15 mmol/L    Glucose 112 (H) 65 - 100 mg/dL    BUN 21 (H) 6 - 20 MG/DL    Creatinine 1.12 (H) 0.55 - 1.02 MG/DL    BUN/Creatinine ratio 19 12 - 20      GFR est AA 56 (L) >60 ml/min/1.73m2    GFR est non-AA 46 (L) >60 ml/min/1.73m2    Calcium 9.9 8.5 - 10.1 MG/DL    Bilirubin, total 0.3 0.2 - 1.0 MG/DL    ALT (SGPT) 24 12 - 78 U/L    AST (SGOT) 20 15 - 37 U/L    Alk. phosphatase 121 (H) 45 - 117 U/L    Protein, total 8.4 (H) 6.4 - 8.2 g/dL    Albumin 3.4 (L) 3.5 - 5.0 g/dL    Globulin 5.0 (H) 2.0 - 4.0 g/dL    A-G Ratio 0.7 (L) 1.1 - 2.2     SAMPLES BEING HELD    Collection Time: 02/10/21  1:33 PM   Result Value Ref Range    SAMPLES BEING HELD 1RED     COMMENT        Add-on orders for these samples will be processed based on acceptable specimen integrity and analyte stability, which may vary by analyte. TROPONIN I    Collection Time: 02/10/21  1:33 PM   Result Value Ref Range    Troponin-I, Qt. <0.05 <0.05 ng/mL   URINALYSIS W/MICROSCOPIC    Collection Time: 02/10/21  3:33 PM   Result Value Ref Range    Color YELLOW/STRAW      Appearance CLEAR CLEAR      Specific gravity 1.019 1.003 - 1.030      pH (UA) 5.0 5.0 - 8.0      Protein TRACE (A) NEG mg/dL    Glucose Negative NEG mg/dL    Ketone Negative NEG mg/dL    Bilirubin Negative NEG      Blood Negative NEG      Urobilinogen 0.2 0.2 - 1.0 EU/dL    Nitrites Negative NEG      Leukocyte Esterase TRACE (A) NEG      WBC 5-10 0 - 4 /hpf    RBC 0-5 0 - 5 /hpf    Epithelial cells FEW FEW /lpf    Bacteria Negative NEG /hpf    Hyaline cast 0-2 0 - 5 /lpf   URINE CULTURE HOLD SAMPLE    Collection Time: 02/10/21  3:33 PM    Specimen: Serum; Urine   Result Value Ref Range    Urine culture hold        Urine on hold in Microbiology dept for 2 days. If unpreserved urine is submitted, it cannot be used for addtional testing after 24 hours, recollection will be required.        Recent Labs     02/10/21  1333   WBC 9.7   HGB 12.5   HCT 40.0        Recent Labs     02/10/21  1333      K 4.2   CL 108   CO2 25   BUN 21*   CREA 1.12*   *   CA 9.9     Recent Labs     02/10/21  1333   ALT 24   *   TBILI 0.3   TP 8.4*   ALB 3.4*   GLOB 5.0*       Recent Labs     02/10/21  1333   TROIQ <0.05       No results for input(s): INR, PTP, APTT, INREXT in the last 72 hours. No results for input(s): PH, PCO2, PO2 in the last 72 hours. Xr Abd (kub)    Result Date: 2/10/2021  Gas pattern is within normal limits. There is no significant fecal stasis. Ct Chest Wo Cont    Result Date: 2/10/2021  1. There is mucus in the trachea. There is also bronchial wall thickening lower lobes with mucous plugging. At the right lung base, there is slight atelectasis and a small groundglass opacity right middle lobe. At the left base, there are areas of atelectasis but also areas of airspace disease/ground glass opacity which could represent pneumonia. There are severe changes of atherosclerosis.     Xr Chest Port    Result Date: 2/10/2021  Basilar subsegmental atelectasis          Assessment & Plan:     Pneumonia: (POA)  -COVID-PUI  -continue doxycycline started in the ED  -check procalcitonin, blood cultures  -scheduled inhaled bronchodilators for bronchospasm  -currently not hypoxic or in respiratory distress, but given bronchospasm, reports of hypoxia at home, will start empiric dexamethasone    HTN: lisinopril recently stopped per last PCP note    Dementia without behavioral disturbances    History of CVA    DVT ppx: sq Lovenox  Code status: full  Disposition: TBD    Signed By: Josesito Fish MD     February 10, 2021

## 2021-02-12 ENCOUNTER — APPOINTMENT (OUTPATIENT)
Dept: GENERAL RADIOLOGY | Age: 86
DRG: 194 | End: 2021-02-12
Attending: INTERNAL MEDICINE
Payer: MEDICARE

## 2021-02-12 LAB
ANION GAP SERPL CALC-SCNC: 6 MMOL/L (ref 5–15)
BASOPHILS # BLD: 0 K/UL (ref 0–0.1)
BASOPHILS NFR BLD: 0 % (ref 0–1)
BUN SERPL-MCNC: 23 MG/DL (ref 6–20)
BUN/CREAT SERPL: 25 (ref 12–20)
CALCIUM SERPL-MCNC: 8.6 MG/DL (ref 8.5–10.1)
CHLORIDE SERPL-SCNC: 110 MMOL/L (ref 97–108)
CO2 SERPL-SCNC: 23 MMOL/L (ref 21–32)
CREAT SERPL-MCNC: 0.92 MG/DL (ref 0.55–1.02)
DIFFERENTIAL METHOD BLD: ABNORMAL
EOSINOPHIL # BLD: 0 K/UL (ref 0–0.4)
EOSINOPHIL NFR BLD: 0 % (ref 0–7)
ERYTHROCYTE [DISTWIDTH] IN BLOOD BY AUTOMATED COUNT: 13.4 % (ref 11.5–14.5)
GLUCOSE SERPL-MCNC: 141 MG/DL (ref 65–100)
HCT VFR BLD AUTO: 35.9 % (ref 35–47)
HGB BLD-MCNC: 11.4 G/DL (ref 11.5–16)
IMM GRANULOCYTES # BLD AUTO: 0.1 K/UL (ref 0–0.04)
IMM GRANULOCYTES NFR BLD AUTO: 1 % (ref 0–0.5)
LYMPHOCYTES # BLD: 1.4 K/UL (ref 0.8–3.5)
LYMPHOCYTES NFR BLD: 15 % (ref 12–49)
MCH RBC QN AUTO: 29.2 PG (ref 26–34)
MCHC RBC AUTO-ENTMCNC: 31.8 G/DL (ref 30–36.5)
MCV RBC AUTO: 92.1 FL (ref 80–99)
MONOCYTES # BLD: 0.2 K/UL (ref 0–1)
MONOCYTES NFR BLD: 2 % (ref 5–13)
NEUTS SEG # BLD: 7.9 K/UL (ref 1.8–8)
NEUTS SEG NFR BLD: 82 % (ref 32–75)
NRBC # BLD: 0 K/UL (ref 0–0.01)
NRBC BLD-RTO: 0 PER 100 WBC
PLATELET # BLD AUTO: 243 K/UL (ref 150–400)
PMV BLD AUTO: 10.2 FL (ref 8.9–12.9)
POTASSIUM SERPL-SCNC: 4.2 MMOL/L (ref 3.5–5.1)
RBC # BLD AUTO: 3.9 M/UL (ref 3.8–5.2)
SODIUM SERPL-SCNC: 139 MMOL/L (ref 136–145)
WBC # BLD AUTO: 9.7 K/UL (ref 3.6–11)

## 2021-02-12 PROCEDURE — 74011250637 HC RX REV CODE- 250/637: Performed by: HOSPITALIST

## 2021-02-12 PROCEDURE — 94640 AIRWAY INHALATION TREATMENT: CPT

## 2021-02-12 PROCEDURE — 74011250636 HC RX REV CODE- 250/636: Performed by: HOSPITALIST

## 2021-02-12 PROCEDURE — 65270000029 HC RM PRIVATE

## 2021-02-12 PROCEDURE — 85025 COMPLETE CBC W/AUTO DIFF WBC: CPT

## 2021-02-12 PROCEDURE — 74230 X-RAY XM SWLNG FUNCJ C+: CPT

## 2021-02-12 PROCEDURE — 36415 COLL VENOUS BLD VENIPUNCTURE: CPT

## 2021-02-12 PROCEDURE — 74011000250 HC RX REV CODE- 250: Performed by: HOSPITALIST

## 2021-02-12 PROCEDURE — 74011250636 HC RX REV CODE- 250/636: Performed by: INTERNAL MEDICINE

## 2021-02-12 PROCEDURE — 92611 MOTION FLUOROSCOPY/SWALLOW: CPT | Performed by: SPEECH-LANGUAGE PATHOLOGIST

## 2021-02-12 PROCEDURE — 80048 BASIC METABOLIC PNL TOTAL CA: CPT

## 2021-02-12 PROCEDURE — 74011000250 HC RX REV CODE- 250: Performed by: INTERNAL MEDICINE

## 2021-02-12 PROCEDURE — 74011000258 HC RX REV CODE- 258: Performed by: HOSPITALIST

## 2021-02-12 PROCEDURE — 74011250637 HC RX REV CODE- 250/637: Performed by: INTERNAL MEDICINE

## 2021-02-12 RX ORDER — IPRATROPIUM BROMIDE AND ALBUTEROL SULFATE 2.5; .5 MG/3ML; MG/3ML
3 SOLUTION RESPIRATORY (INHALATION)
Status: DISCONTINUED | OUTPATIENT
Start: 2021-02-12 | End: 2021-02-15

## 2021-02-12 RX ORDER — ENOXAPARIN SODIUM 100 MG/ML
40 INJECTION SUBCUTANEOUS EVERY 24 HOURS
Status: DISCONTINUED | OUTPATIENT
Start: 2021-02-12 | End: 2021-02-17 | Stop reason: HOSPADM

## 2021-02-12 RX ADMIN — BUDESONIDE 500 MCG: 0.5 INHALANT RESPIRATORY (INHALATION) at 08:10

## 2021-02-12 RX ADMIN — DOXYCYCLINE 100 MG: 100 INJECTION, POWDER, LYOPHILIZED, FOR SOLUTION INTRAVENOUS at 22:13

## 2021-02-12 RX ADMIN — Medication 10 ML: at 13:06

## 2021-02-12 RX ADMIN — Medication 10 ML: at 22:17

## 2021-02-12 RX ADMIN — BUDESONIDE 500 MCG: 0.5 INHALANT RESPIRATORY (INHALATION) at 22:38

## 2021-02-12 RX ADMIN — METHYLPREDNISOLONE SODIUM SUCCINATE 60 MG: 125 INJECTION, POWDER, FOR SOLUTION INTRAMUSCULAR; INTRAVENOUS at 17:00

## 2021-02-12 RX ADMIN — IPRATROPIUM BROMIDE AND ALBUTEROL SULFATE 3 ML: .5; 3 SOLUTION RESPIRATORY (INHALATION) at 08:10

## 2021-02-12 RX ADMIN — DOXYCYCLINE 100 MG: 100 INJECTION, POWDER, LYOPHILIZED, FOR SOLUTION INTRAVENOUS at 12:07

## 2021-02-12 RX ADMIN — AMLODIPINE BESYLATE 2.5 MG: 5 TABLET ORAL at 10:21

## 2021-02-12 RX ADMIN — METHYLPREDNISOLONE SODIUM SUCCINATE 60 MG: 125 INJECTION, POWDER, FOR SOLUTION INTRAMUSCULAR; INTRAVENOUS at 05:44

## 2021-02-12 RX ADMIN — ENOXAPARIN SODIUM 40 MG: 40 INJECTION SUBCUTANEOUS at 10:20

## 2021-02-12 RX ADMIN — ESCITALOPRAM OXALATE 10 MG: 10 TABLET ORAL at 10:21

## 2021-02-12 RX ADMIN — IPRATROPIUM BROMIDE AND ALBUTEROL SULFATE 3 ML: .5; 3 SOLUTION RESPIRATORY (INHALATION) at 22:38

## 2021-02-12 NOTE — PROGRESS NOTES
Lovenox Monitoring  Indication: DVT Prophylaxis  Recent Labs     02/12/21  0312 02/10/21  1333   HGB 11.4* 12.5    220   CREA 0.92 1.12*     Current Weight: 62 kg  Est. CrCl = 36 ml/min  Current Dose: 30 mg subcutaneously every 24 hours.   Plan: Change to enoxaparin 40 mg Q24h for crcl > 30 ml/min

## 2021-02-12 NOTE — PROGRESS NOTES
Problem: Dysphagia (Adult)  Goal: *Acute Goals and Plan of Care (Insert Text)  Description: Speech therapy goals  Initiated 2/11/2021   1. Patient will tolerate dysphagia 2/nectar thick liquid diet without s/s of aspiration within 7 days   2. Patient will participate in [address] study for further objective imaging of the swallow within 7 days MET 2/12/2021   Outcome: Progressing Towards Goal     SPEECH PATHOLOGY MODIFIED BARIUM SWALLOW STUDY  Patient: June Pat (89 y.o. female)  Date: 2/12/2021  Primary Diagnosis: Pneumonia [J18.9]        Precautions: aspiration, fall        ASSESSMENT :  Based on the objective data described below, the patient presents with mild/moderate oropharyngeal dysphagia characterized by delayed and incomplete mastication of solids needing increased time for breakdown and clearance of solids, delayed posterior propulsion, decreased bolus formation and control with premature spillage, mild/moderate oral residue (inconsistent), and piecemeal propulsion. Furthermore, patient impulsive and difficult to cue for small/single sips with no carryover from one presentation to the next. Pharyngeal dysphagia characterized by delayed swallow initiation, reduced laryngeal closure, reduced tongue base retraction, and mild/moderate pharyngeal residue. Patient with inconsistent aspiration with thin liquids, ranging from trace to lana, very large volume. Aspiration was silent except with very large volume which elicited a weak ineffective cough with barium remaining down the anterior tracheal wall. Aspiration occurred both during the swallow secondary to reduced laryngeal closure, as well as after the swallow related to piecemeal propulsion with larger sip with inability to contain the bolus in the oral cavity and immediate spillage of large volume into the airway before she was able to elicit an additional swallow.   There was occasional trace flash penetration with nectar thick liquids that cleared immediately with the force of the swallow. No residual penetration or aspiration including with rapid successive straw sips nectar. Risks for aspiration remain related to mental status and decreased ability to carryover strategies for safety. Patient will benefit from skilled intervention to address the above impairments. Patients rehabilitation potential is considered to be Guarded     PLAN :  Recommendations and Planned Interventions:  --recommend continue with dysphagia 2/nectar thick liquid diet. Straws ok. Meds as tolerated. Encourage slow rate of intake, upright for PO. --will follow for diet tolerance   Frequency/Duration: Patient will be followed by speech-language pathology 2 times a week to address goals. Discharge Recommendations: To Be Determined     SUBJECTIVE:   Patient alert, cooperative. OBJECTIVE:     Past Medical History:   Diagnosis Date    Alcoholism ([facility] [address] 11/10/2011    Arthritis     Dementia     Dementia ([facility] [address] 11/10/2011    Depression 11/10/2011    Diverticulosis 1/27/2014    HTN (hypertension) 6/18/2012    Hyperlipidemia 6/24/2012    Hypertension     Hypovitaminosis D 12/17/2013    Intraventricular hemorrhage, nontraumatic ([address] 6/18/2012    Mass of pancreas 1/27/2014    1.5 CM DENSITY POST HEAD OF PANCREAS ON CT 1/27/2014    Osteoporosis 12/16/2013    Other ill-defined conditions(799.89)     broken right shoulder    Stroke ([facility] [address]      Past Surgical History:   Procedure Laterality Date    HX APPENDECTOMY      HX CATARACT REMOVAL      HX CHOLECYSTECTOMY      HX HYSTERECTOMY      HX ORTHOPAEDIC      repair lt shoulder fx     Prior Level of Function/Home Situation:      Diet prior to admission: regular  Current Diet:  dysphagia 2/nectar thick liquids   Radiologist: Dr. Marycarmen Coughlin Views: Lateral;Fluoro  Patient Position: upright in hausted chair     Trial 1: Trial 2:   Consistency Presented: Thin liquid Consistency Presented: Nectar thick liquid;Puree; Solid   How Presented: SLP-fed/presented;Self-fed/presented;Cup/sip;Cup/gulp;Straw;Successive swallows(unable to cue appropriately for slow rate ) How Presented: Self-fed/presented;SLP-fed/presented;Cup/sip;Cup/gulp;Straw;Successive swallows;Spoon   Consistency Amount: (50cc thin Ba ) Consistency Amount: (200cc nectar thick Ba 1 tbsp Ba paste )   Bolus Acceptance: No impairment Bolus Acceptance: No impairment   Bolus Formation/Control: Impaired: Premature spillage;Delayed;Piecemeal Bolus Formation/Control: Impaired: Incomplete;Delayed;Mastication;Premature spillage   Propulsion: Discoordination;Delayed (# of seconds) Propulsion: Delayed (# of seconds); Discoordination   Oral Residue: Lingual Oral Residue: Lingual(cleraed independently )   Initiation of Swallow: Triggered at vallecula;Triggered at pyriform sinus(es)(mostly pyriforms with thins ) Initiation of Swallow: Triggered at valleculae   Timing: Pooling 1-5 sec Timing: Pooling 1-5 sec   Penetration: During swallow; To cords; After swallow;From initial swallow;From residual Penetration: During swallow(trace with nectar, cleared immediately )   Aspiration/Timing: Repeated;During; After;From initial swallow;From residual Aspiration/Timing: No evidence of aspiration   Pharyngeal Clearance: Vallecular residue;Pyriform residue ; Less than 10% Pharyngeal Clearance: Vallecular residue;Pyriform residue ; Less than 10%   Attempted Modifications: Cup/sip;Small sips and bites     Effective Modifications: None     Cues for Modifications: Maximal     Comments: mental status limits ability to carryover strategies. cough only present with large volume aspiratio n       Decreased Tongue Base Retraction?: Yes  Laryngeal Elevation: Incomplete laryngeal closure; Reduced excursion with laryngeal vestibule gap; Inadequate epiglottic inversion  Aspiration/Penetration Score: 8 (Aspiration-Contrast passes cords/glottis with no effort to eject, ie/silent aspiration)  Pharyngeal Symmetry: Not assessed  Pharyngeal-Esophageal Segment: No impairment  Pharyngeal Dysfunction: Decreased tongue base retraction;Decreased strength;Decreased elevation/closure    Oral Phase Severity: Mild-moderate  Pharyngeal Phase Severity: Mild moderate  NOMS:   The NOMS functional outcome measure was used to quantify this patient's level of swallowing impairment. Based on the NOMS, the patient was determined to be at level 4 for swallow function         NOMS Swallowing Levels:  Level 1 (CN): NPO  Level 2 (CM): NPO but takes consistency in therapy  Level 3 (CL): Takes less than 50% of nutrition p.o. and continues with nonoral feedings; and/or safe with mod cues; and/or max diet restriction  Level 4 (CK): Safe swallow but needs mod cues; and/or mod diet restriction; and/or still requires some nonoral feeding/supplements  Level 5 (CJ): Safe swallow with min diet restriction; and/or needs min cues  Level 6 (CI): Independent with p.o.; rare cues; usually self cues; may need to avoid some foods or needs extra time  Level 7 ([address] [place] [address]): Independent for all p.o.  JORY. (2003). National Outcomes Measurement System (NOMS): Adult Speech-Language Pathology User's Guide. COMMUNICATION/EDUCATION:     The patients plan of care including findings from MBS, recommendations, planned interventions, and recommended diet changes were discussed with: Registered nurse. Patient/family have participated as able in goal setting and plan of care. Patient/family agree to work toward stated goals and plan of care. Thank you for this referral.  Chelsea Lynn M.CD.  CCC-SLP   Time Calculation: 20 mins

## 2021-02-12 NOTE — PROGRESS NOTES
Medical Progress Note      NAME: Lavern Boas   :  3/16/1931  MRM:  [id number]    Date/Time: 2021           Problem List:     Active Problems:    Dementia ([address] (11/10/2011)      HTN (hypertension) (2012)      Pneumonia (2/10/2021)             Subjective:     Hx Dementia. She gives limited hx. Denies sob. No cough problem overnight. Past Medical History:   Diagnosis Date    Alcoholism ([facility] [address] 11/10/2011    Arthritis     Dementia     Dementia ([facility] [address] 11/10/2011    Depression 11/10/2011    Diverticulosis 2014    HTN (hypertension) 2012    Hyperlipidemia 2012    Hypertension     Hypovitaminosis D 2013    Intraventricular hemorrhage, nontraumatic ([facility] [address] 2012    Mass of pancreas 2014    1.5 CM DENSITY POST HEAD OF PANCREAS ON CT 2014    Osteoporosis 2013    Other ill-defined conditions(799.89)     broken right shoulder    Stroke ([facility] [address]             Objective:         Vitals:      Last 24hrs VS reviewed since prior progress note. Most recent are:    Visit Vitals  /76 (BP 1 Location: Right upper arm, BP Patient Position: At rest)   Pulse 79   Temp 97.7 °F (36.5 °C)   Resp 17   Wt 139 lb 1.8 oz (63.1 kg)   SpO2 93%   BMI 25.44 kg/m²     SpO2 Readings from Last 6 Encounters:   21 93%   02/10/21 97%   21 99%   21 95%   20 96%   19 97%        No intake or output data in the 24 hours ending 21 0725               Exam:      General:  Alert, cooperative, no distress, appears stated age. ENT : throat clear   No stridor noted      Lungs:   Clearer vs yesterday  Mild scattered exp wheezes     Heart:  Regular rate and rhythm, S1, S2 normal, no murmur, click, rub or gallop. Abdomen:   Soft, non-tender. Bowel sounds normal. No masses,  No organomegaly. Extremities: No pedal  Edema. Neuro A and O to self only.       Lab Data Reviewed: (see below)  Recent Results (from the past 24 hour(s))   C REACTIVE PROTEIN, QT Collection Time: 02/11/21  3:19 PM   Result Value Ref Range    C-Reactive protein 1.78 (H) 0.00 - 0.60 mg/dL   CBC WITH AUTOMATED DIFF    Collection Time: 02/12/21  3:12 AM   Result Value Ref Range    WBC 9.7 3.6 - 11.0 K/uL    RBC 3.90 3.80 - 5.20 M/uL    HGB 11.4 (L) 11.5 - 16.0 g/dL    HCT 35.9 35.0 - 47.0 %    MCV 92.1 80.0 - 99.0 FL    MCH 29.2 26.0 - 34.0 PG    MCHC 31.8 30.0 - 36.5 g/dL    RDW 13.4 11.5 - 14.5 %    PLATELET 474 704 - 007 K/uL    MPV 10.2 8.9 - 12.9 FL    NRBC 0.0 0  WBC    ABSOLUTE NRBC 0.00 0.00 - 0.01 K/uL    NEUTROPHILS 82 (H) 32 - 75 %    LYMPHOCYTES 15 12 - 49 %    MONOCYTES 2 (L) 5 - 13 %    EOSINOPHILS 0 0 - 7 %    BASOPHILS 0 0 - 1 %    IMMATURE GRANULOCYTES 1 (H) 0.0 - 0.5 %    ABS. NEUTROPHILS 7.9 1.8 - 8.0 K/UL    ABS. LYMPHOCYTES 1.4 0.8 - 3.5 K/UL    ABS. MONOCYTES 0.2 0.0 - 1.0 K/UL    ABS. EOSINOPHILS 0.0 0.0 - 0.4 K/UL    ABS. BASOPHILS 0.0 0.0 - 0.1 K/UL    ABS. IMM.  GRANS. 0.1 (H) 0.00 - 0.04 K/UL    DF AUTOMATED     METABOLIC PANEL, BASIC    Collection Time: 02/12/21  3:12 AM   Result Value Ref Range    Sodium 139 136 - 145 mmol/L    Potassium 4.2 3.5 - 5.1 mmol/L    Chloride 110 (H) 97 - 108 mmol/L    CO2 23 21 - 32 mmol/L    Anion gap 6 5 - 15 mmol/L    Glucose 141 (H) 65 - 100 mg/dL    BUN 23 (H) 6 - 20 MG/DL    Creatinine 0.92 0.55 - 1.02 MG/DL    BUN/Creatinine ratio 25 (H) 12 - 20      GFR est AA >60 >60 ml/min/1.73m2    GFR est non-AA 57 (L) >60 ml/min/1.73m2    Calcium 8.6 8.5 - 10.1 MG/DL       Medications Reviewed: (see below)    ______________________________________________________________________    Medications:     Current Facility-Administered Medications   Medication Dose Route Frequency    amLODIPine (NORVASC) tablet 2.5 mg  2.5 mg Oral DAILY    budesonide (PULMICORT) 500 mcg/2 ml nebulizer suspension  500 mcg Nebulization BID RT    methylPREDNISolone (PF) (SOLU-MEDROL) injection 60 mg  60 mg IntraVENous Q12H    sodium chloride (NS) flush 5-40 mL  5-40 mL IntraVENous Q8H    sodium chloride (NS) flush 5-40 mL  5-40 mL IntraVENous PRN    acetaminophen (TYLENOL) tablet 650 mg  650 mg Oral Q6H PRN    Or    acetaminophen (TYLENOL) suppository 650 mg  650 mg Rectal Q6H PRN    polyethylene glycol (MIRALAX) packet 17 g  17 g Oral DAILY PRN    promethazine (PHENERGAN) tablet 12.5 mg  12.5 mg Oral Q6H PRN    Or    ondansetron (ZOFRAN) injection 4 mg  4 mg IntraVENous Q6H PRN    enoxaparin (LOVENOX) injection 30 mg  30 mg SubCUTAneous DAILY    escitalopram oxalate (LEXAPRO) tablet 10 mg  10 mg Oral DAILY    albuterol-ipratropium (DUO-NEB) 2.5 MG-0.5 MG/3 ML  3 mL Nebulization QID RT    albuterol (PROVENTIL VENTOLIN) nebulizer solution 2.5 mg  2.5 mg Nebulization Q4H PRN    guaiFENesin-dextromethorphan (ROBITUSSIN DM) 100-10 mg/5 mL syrup 5 mL  5 mL Oral Q4H PRN    doxycycline (VIBRAMYCIN) 100 mg in 0.9% sodium chloride (MBP/ADV) 100 mL MBP  100 mg IntraVENous Q12H                   Assessment:   LLL Pneumonia   B/l  Lower lobe L>R bronchial wall with mucous with thickening with Eosinophils -asthma ? COPD   COVID negative   Abnormal swallowing on speech therapy evaluation. For MBS today . HTN improved control on Norvasc. Dementia   Patient Active Problem List   Diagnosis Code    Right hip pain M25.551    Dementia (HCC) F03.90    Depression F32.9    Intraventricular hemorrhage, nontraumatic (HCC) I61.5    HTN (hypertension) I10    Hyperlipidemia E78.5    Alkaline phosphatase elevation R74.8    Altered mental status R41.82    Low back pain M54.5    Osteoporosis M81.0    Hypovitaminosis D E55.9    GI bleed K92.2    Rectal bleeding K62.5    Mass of pancreas K86.89    Diverticulosis K57.90    Pneumonia J18.9          Plan:     On Solumedrol, Doxycycline, Duoneb, Budesonide nebs. All c/s's appreciated  Can transfer to [facility]. Mobilize with PT   I spoke w/  on phone yesterday. ___________________________________________________      Attending Physician: Sofiya Webster MD

## 2021-02-12 NOTE — PROGRESS NOTES
Name: Ruben Murray: [facility] CHARLIE   : 3/16/1931 Admit Date: 2/10/2021   Phone: [phone]  Room: 607/   PCP: Bouchra Sanon MD  MRN: [id number]   Date: 2021  Code: Full Code        HPI:      No breathing issues at this time  On room air       12:57 PM       History was obtained from patient and medical records. I was asked by Tarah Cabral MD to see Arlene Baum in consultation for a chief complaint of hypoxemia. History of Present Illness: 80year old female with past medical history as given below who presented to [facility] with increased cough and noted to have hypoxemia in the ED and subsequently admitted. Patient is a poor historian and denies any cough, shortness of breath or fever to me. Per notes she reportedly had cough for the past 4 days and her O2 levels dropped to 70%. She saw her pcp and subsequently was referred to [facility] for admission. Patient is a former smoker 1 ppd x 50 years. Ct Chest reviewed - LLL infiltrate vs atelectasis. Mucous in trachea and LLL bronchial thickening. Eosinophils 6%. Started on decadron 6 mg iv q 24 and Doxycycline. PCT 0.08  COVID-19 pending.     Past Medical History:   Diagnosis Date    Alcoholism ([address] 11/10/2011    Arthritis     Dementia     Dementia ([facility] [address] 11/10/2011    Depression 11/10/2011    Diverticulosis 2014    HTN (hypertension) 2012    Hyperlipidemia 2012    Hypertension     Hypovitaminosis D 2013    Intraventricular hemorrhage, nontraumatic ([address] 2012    Mass of pancreas 2014    1.5 CM DENSITY POST HEAD OF PANCREAS ON CT 2014    Osteoporosis 2013    Other ill-defined conditions(799.68)     broken right shoulder    Stroke [facility])        Past Surgical History:   Procedure Laterality Date    HX APPENDECTOMY      HX CATARACT REMOVAL      HX CHOLECYSTECTOMY      HX HYSTERECTOMY      HX ORTHOPAEDIC      repair lt shoulder fx       History reviewed. No pertinent family history. Social History     Tobacco Use    Smoking status: Former Smoker     Packs/day: 1.00     Years: 50.00     Pack years: 50.00   Substance Use Topics    Alcohol use: Yes     Comment: dtrs state amount unknown       Allergies   Allergen Reactions    Codeine Hives     Pt cannot really remember allergy, uncertain of reactioni    Ibuprofen Other (comments)     Abn LFT'S & FEVER    Pcn [Penicillins] Hives       Current Facility-Administered Medications   Medication Dose Route Frequency    amLODIPine (NORVASC) tablet 2.5 mg  2.5 mg Oral DAILY    budesonide (PULMICORT) 500 mcg/2 ml nebulizer suspension  500 mcg Nebulization BID RT    methylPREDNISolone (PF) (SOLU-MEDROL) injection 60 mg  60 mg IntraVENous Q12H    sodium chloride (NS) flush 5-40 mL  5-40 mL IntraVENous Q8H    sodium chloride (NS) flush 5-40 mL  5-40 mL IntraVENous PRN    acetaminophen (TYLENOL) tablet 650 mg  650 mg Oral Q6H PRN    Or    acetaminophen (TYLENOL) suppository 650 mg  650 mg Rectal Q6H PRN    polyethylene glycol (MIRALAX) packet 17 g  17 g Oral DAILY PRN    promethazine (PHENERGAN) tablet 12.5 mg  12.5 mg Oral Q6H PRN    Or    ondansetron (ZOFRAN) injection 4 mg  4 mg IntraVENous Q6H PRN    enoxaparin (LOVENOX) injection 30 mg  30 mg SubCUTAneous DAILY    escitalopram oxalate (LEXAPRO) tablet 10 mg  10 mg Oral DAILY    albuterol-ipratropium (DUO-NEB) 2.5 MG-0.5 MG/3 ML  3 mL Nebulization QID RT    albuterol (PROVENTIL VENTOLIN) nebulizer solution 2.5 mg  2.5 mg Nebulization Q4H PRN    guaiFENesin-dextromethorphan (ROBITUSSIN DM) 100-10 mg/5 mL syrup 5 mL  5 mL Oral Q4H PRN    doxycycline (VIBRAMYCIN) 100 mg in 0.9% sodium chloride (MBP/ADV) 100 mL MBP  100 mg IntraVENous Q12H         REVIEW OF SYSTEMS   Negative except as stated in the HPI.       Physical Exam:   Visit Vitals  /76 (BP 1 Location: Right upper arm, BP Patient Position: At rest)   Pulse 79   Temp 97.7 °F (36.5 °C) Resp 17   Wt 62.4 kg (137 lb 8 oz)   SpO2 93%   BMI 25.15 kg/m²       General:  Alert, cooperative. Head:  Normocephalic. Eyes:  Conjunctivae/corneas clear. Nose: Nares normal. Septum midline. Throat: Lips, mucosa, and tongue normal. Teeth and gums normal.   Neck: Supple, symmetrical, trachea midline. Lungs:   Decreased air entry at the bases. Heart:  Regular rate and rhythm, S1, S2 normal, no murmur, click, rub or gallop. Abdomen:   Soft, non-tender. Bowel sounds normal. No masses,  No organomegaly. Extremities: No edema. Skin: Skin color, texture, turgor normal. No rashes or lesions       Neurologic: Grossly nonfocal       Lab Results   Component Value Date/Time    Sodium 139 02/12/2021 03:12 AM    Potassium 4.2 02/12/2021 03:12 AM    Chloride 110 (H) 02/12/2021 03:12 AM    CO2 23 02/12/2021 03:12 AM    BUN 23 (H) 02/12/2021 03:12 AM    Creatinine 0.92 02/12/2021 03:12 AM    Glucose 141 (H) 02/12/2021 03:12 AM    Calcium 8.6 02/12/2021 03:12 AM    Magnesium 1.7 01/17/2014 07:04 PM    Phosphorus 3.3 06/19/2012 04:00 AM    Lactic acid 1.2 04/30/2020 12:16 AM       Lab Results   Component Value Date/Time    WBC 9.7 02/12/2021 03:12 AM    HGB 11.4 (L) 02/12/2021 03:12 AM    PLATELET 527 71/38/3768 03:12 AM    MCV 92.1 02/12/2021 03:12 AM       Lab Results   Component Value Date/Time    Color YELLOW/STRAW 02/10/2021 03:33 PM    Appearance CLEAR 02/10/2021 03:33 PM    pH (UA) 5.0 02/10/2021 03:33 PM    Protein TRACE (A) 02/10/2021 03:33 PM    Glucose Negative 02/10/2021 03:33 PM    Ketone Negative 02/10/2021 03:33 PM    Bilirubin Negative 02/10/2021 03:33 PM    Blood Negative 02/10/2021 03:33 PM    Urobilinogen 0.2 02/10/2021 03:33 PM    Nitrites Negative 02/10/2021 03:33 PM    Leukocyte Esterase TRACE (A) 02/10/2021 03:33 PM    WBC 5-10 02/10/2021 03:33 PM    RBC 0-5 02/10/2021 03:33 PM    Bacteria Negative 02/10/2021 03:33 PM       IMPRESSION:  ==========  -LLL pneumonia.   -Bilateral Lower lobe L > R bronchial wall thickening with mucous with elevated eosinophils - asthma?  -Eosinophilia. -COPD with 50 pack year smoking hx.  -Former smoker. PLAN:  =====  -IV solumedrol, transition to PO over the weekend for taper  -on doxy  -send IgE and RAST.   -ICS/LABA agent.  -recommend swallow study before discharge.  -could be discharged soon from pulmonary aspect   -prn over the weekend     Magdi Khanna PA-C

## 2021-02-12 NOTE — PROGRESS NOTES
Comprehensive Nutrition Assessment    Type and Reason for Visit: Initial, Positive nutrition screen    Nutrition Recommendations/Plan:    1. Diet per SLP/MBS (per daughter, pt was doing pureed at home as it was easier to swallow with the amount of mucous she was producing. Also avoiding dairy products d/t mucous). 2. If pt allowed thin liquids, add Ensure Clear BID. Otherwise, do not add as it cannot be thickened. Would continue Magic Cup/ Boost Pudding if not making swallowing/mucous production worse. However, regular Ensure \"too thick and milky\" and was difficult for pt to swallow at home. 3. Per daughter - pt's current caregiver is \"Trish\" @ [phone]. The caregiver Rakesh Marroquin" listed in demographics no longer works with pt. Nutrition Assessment:     80 y.o. female who has a PMHx of alcoholism, arthritis, dementia, depression, hemorrhagic stroke, and COPD(former smoker) among others. Admitted with Pneumonia [J18.9]    MST received on admission for \"unsure of wt loss\" - however, SLP consult pending at that time. Noted yesterday's eval - now on NDD2 with nectar thick liquids, but down for MBS today to determine extent of aspiration and ability to resume thin liquids vs continuing modified diet. COVID negative. Discussed in rounds - reported to be from home, has caretaker. Did not eat BF this am.  PO 0-25% yesterday. Wt hx scattered. Unclear if on modified diet PTA. PCP records indicate dysphagia and wheezing at Jan 6, 2021 appointment - [facility] with swallow eval ordered, but unclear if occurred/ results. Remote RD notes from 2012 indicate pt on dysphagia 2 diet at that time as well. Pt seen by pulmonary and ENT consult pending (d/t mucous issues). I called caregiver listed in demographics, but no answer. Next I called pt's daughter Shante Vences who was able to give me a limited amount of information as she says they have been trying to limit interaction with pt d/t COVID.   She states that the caregiver listed in chart is no longer working for them and new caregiver is True David - [phone]. However, when I called this number, no one answered and no name given on voicemail. Per limited information given by daughter, they had started to puree pt's food as of recently d/t the amount of mucous she was producing. From what she knows, she believes pt was doing well on this diet. However, states she really likes yogurt and also like Ensure, but they were told to avoid these/ dairy products d/t the mucous. She states they had switched to Ensure Clear, but not sure of the receptiveness. Unfortunately, Ensure Clear cannot be thickened. Ensure Enlive can (although prefer to not do so in hospital d/t risk of it not being properly thickened). ONS Magic Cup and Boost Pudding were added yesterday upon diet advancement, but unclear of receptiveness and now concerned should be limiting dairy if mucous is such a concern. Will await MBS results, adjust supplements if needed/ able. Still unsure of wt loss at this time as unable to reach caregiver. RD will continue to follow.       Malnutrition Assessment:  Malnutrition Status:  Insufficient data      Nutritionally Significant Medications:   Doxycycline, solumedrol      Estimated Daily Nutrient Needs:  Energy (kcal): 7829-1349(MSJ x 1.2-1.3)  Protein (g): 60(1 gm/kg)  Fluid (ml/day): 1500(older adult)    Nutrition Related Findings:   Edema: none  Last BM: 2/11 - soft  Abdomen: WDL    Wounds:    None       Current Nutrition Therapies:  DIET DYSPHAGIA MECH ALTERED (NDD2) 2 [place]/2 Mildly Thick  DIET NUTRITIONAL SUPPLEMENTS Breakfast, Lunch; Pudding, Fortified  DIET NUTRITIONAL SUPPLEMENTS Lunch, Dinner; [employer] intake:   Patient Vitals for the past 168 hrs:   % Diet Eaten   02/11/21 1233 25 %   02/11/21 0852 0 %         Anthropometric Measures:  · Height:  5' 2\" (157.5 cm)  · Current Body Wt:  62.4 kg (137 lb 9.1 oz)   · Admission Body Wt:  140 lb 10.5 oz(63.8 kg)    · Usual Body Wt:        · Ideal Body Wt:  110 lbs:  125.1 %   · Adjusted Body Weight:   ; Weight Adjustment for:     · Adjusted BMI:       · BMI Category: Overweight (BMI 25.0-29. 9)       Wt Readings from Last 20 Encounters:   02/12/21 62.4 kg (137 lb 8 oz)   02/10/21 63.8 kg (140 lb 10.5 oz) - bed, admission   09/14/17 65.3 kg (144 lb)   08/15/17 65.3 kg (144 lb)   07/23/14 54.9 kg (121 lb)   01/27/14 55 kg (121 lb 4.1 oz)   01/19/14 55.5 kg (122 lb 5.7 oz)   12/15/13 63.5 kg (140 lb)   09/01/13 63.5 kg (140 lb)   08/05/13 65.3 kg (144 lb)   06/25/13 65.3 kg (144 lb)   07/20/12 49.9 kg (110 lb)   06/22/12 50.4 kg (111 lb 1.8 oz)   11/10/11 50.3 kg (111 lb)   06/04/11 53.5 kg (118 lb)       Nutrition Diagnosis:   · Inadequate oral intake related to cognitive or neurological impairment, swallowing difficulty(mucous) as evidenced by intake 0-25%      Nutrition Interventions:   Food and/or Nutrient Delivery: Continue current diet, Continue oral nutrition supplement  Nutrition Education and Counseling: No recommendations at this time  Coordination of Nutrition Care: Continue to monitor while inpatient, Feeding assistance/environmental change, Interdisciplinary rounds, Speech therapy, Swallow evaluation    Goals:  PO >25% of most meals with 2 ONS daily within 5 days       Nutrition Monitoring and Evaluation:   Behavioral-Environmental Outcomes: None identified  Food/Nutrient Intake Outcomes: Food and nutrient intake, Diet advancement/tolerance, Supplement intake  Physical Signs/Symptoms Outcomes: Biochemical data, GI status, Meal time behavior, Nutrition focused physical findings, Weight    Discharge Planning:     Too soon to determine     Recent Labs     02/12/21  0312 02/10/21  1333   * 112*   BUN 23* 21*   CREA 0.92 1.12*    138   K 4.2 4.2   * 108   CO2 23 25   CA 8.6 9.9       Recent Labs     02/10/21  1333   ALT 24   *   TBILI 0.3   TP 8.4*   ALB 3.4*   GLOB 5.0* No results for input(s): LAC in the last 72 hours.     Recent Labs     02/12/21  0312 02/10/21  1333   WBC 9.7 9.7   HGB 11.4* 12.5   HCT 35.9 40.0    220       Manual PARMINDER Nicole  Available via [employer]  Or Pager# [phone]

## 2021-02-12 NOTE — PROGRESS NOTES
Reason for Admission:   Pneumonia                  RUR Score:     15%             PCP: First and Last name:  Swetha Manuel   Name of Practice:    Are you a current patient: Yes/No: Yes   Approximate date of last visit:  2/11/21   Can you participate in a virtual visit if needed:     Do you (patient/family) have any concerns for transition/discharge? No                Plan for utilizing home health:  Not indicated at this time     Current Advanced Directive/Advance Care Plan:  No AMD on file    Healthcare Decision Maker:   [address] [place] [address] [phone]            Transition of Care Plan:   EMR reviewed. Patient is covid negative. History includes alcoholism, dementia, CVA, HTN, and arthritis. Call placed to spouse phone answered by caregiver Verito Jones [phone] introduced to role of CM. Phone transferred to speaker and communicated w/ spouse and caregiver. Additional history obtained and permission obtained to contact daughter if needed. Couple live in 2 story house w/ basement and chairlift is utilized.  has DME however uses hand assistance for ambulation.  does not reference any previous [facility] providers. Care is provided in the home 8A-8P / 7 days/week. No transitional care needs verbalized at this time. [facility] Medicare A/B and [employer] are insurance providers. [facility] is local pharmacy used. No AMD on file. Case Management will follow. Care Management Interventions  PCP Verified by CM: Yes  Last Visit to PCP: 02/11/21  Palliative Care Criteria Met (RRAT>21 & CHF Dx)?: No  Transition of Care Consult (CM Consult):  Other(Assessment)  MyChart Signup: No  Discharge Durable Medical Equipment: No  Health Maintenance Reviewed: Yes  Physical Therapy Consult: Yes  Occupational Therapy Consult: No  Speech Therapy Consult: Yes  Current Support Network: Lives with Spouse, Own Home, Has Personal Caregivers  [employer] Information Provided?: No  Discharge Location  Discharge Placement: (TBD)

## 2021-02-12 NOTE — PROGRESS NOTES
PCP addendum. I spoke with matthias Walker on phone  [phone] .  My svc please call her on w/e rounds

## 2021-02-13 LAB
A ALTERNATA IGE QN: <0.1 KU/L
A FUMIGATUS IGE QN: <0.1 KU/L
AMER ROACH IGE QN: <0.1 KU/L
AMER SYCAMORE IGE QN: <0.1 KU/L
BACTERIA SPEC CULT: NORMAL
BAHIA GRASS IGE QN: <0.1 KU/L
BERMUDA GRASS IGE QN: <0.1 KU/L
BOXELDER IGE QN: <0.1 KU/L
C HERBARUM IGE QN: <0.1 KU/L
CAT DANDER IGG QN: <0.1 KU/L
CLASS DESCRIPTION, 600268: NORMAL
COMMON RAGWEED IGE QN: <0.1 KU/L
D FARINAE IGE QN: <0.1 KU/L
D PTERONYSS IGE QN: <0.1 KU/L
DEPRECATED IGE QN: <0.1 KU/L
DOG DANDER IGE QN: <0.1 KU/L
ENGL PLANTAIN IGE QN: <0.1 KU/L
IGE SERPL-ACNC: 11 IU/ML (ref 6–495)
JOHNSON GRASS IGE QN: <0.1 KU/L
M RACEMOSUS IGE QN: <0.1 KU/L
MT JUNIPER IGE QN: <0.1 KU/L
MUGWORT IGE QN: <0.1 KU/L
NETTLE IGE QN: <0.1 KU/L
P NOTATUM IGE QN: <0.1 KU/L
S BOTRYOSUM IGE QN: <0.1 KU/L
SERVICE CMNT-IMP: NORMAL
SHEEP SORREL IGE QN: <0.1 KU/L
SWEET GUM IGE QN: <0.1 KU/L
TIMOTHY IGE QN: <0.1 KU/L
WHITE BIRCH IGE QN: <0.1 KU/L
WHITE ELM IGG QN: <0.1 KU/L
WHITE HICKORY IGE QN: <0.1 KU/L
WHITE MULBERRY IGE QN: <0.1 KU/L
WHITE OAK IGE QN: <0.1 KU/L

## 2021-02-13 PROCEDURE — 74011250636 HC RX REV CODE- 250/636: Performed by: HOSPITALIST

## 2021-02-13 PROCEDURE — 74011250636 HC RX REV CODE- 250/636: Performed by: INTERNAL MEDICINE

## 2021-02-13 PROCEDURE — 74011000250 HC RX REV CODE- 250: Performed by: HOSPITALIST

## 2021-02-13 PROCEDURE — 74011250637 HC RX REV CODE- 250/637: Performed by: INTERNAL MEDICINE

## 2021-02-13 PROCEDURE — 74011250637 HC RX REV CODE- 250/637: Performed by: HOSPITALIST

## 2021-02-13 PROCEDURE — 74011000258 HC RX REV CODE- 258: Performed by: HOSPITALIST

## 2021-02-13 PROCEDURE — 94664 DEMO&/EVAL PT USE INHALER: CPT

## 2021-02-13 PROCEDURE — 74011000258 HC RX REV CODE- 258: Performed by: INTERNAL MEDICINE

## 2021-02-13 PROCEDURE — 65270000029 HC RM PRIVATE

## 2021-02-13 PROCEDURE — 74011000250 HC RX REV CODE- 250: Performed by: INTERNAL MEDICINE

## 2021-02-13 PROCEDURE — 94640 AIRWAY INHALATION TREATMENT: CPT

## 2021-02-13 RX ADMIN — IPRATROPIUM BROMIDE AND ALBUTEROL SULFATE 3 ML: .5; 3 SOLUTION RESPIRATORY (INHALATION) at 20:20

## 2021-02-13 RX ADMIN — METHYLPREDNISOLONE SODIUM SUCCINATE 60 MG: 125 INJECTION, POWDER, FOR SOLUTION INTRAMUSCULAR; INTRAVENOUS at 17:03

## 2021-02-13 RX ADMIN — BUDESONIDE 500 MCG: 0.5 INHALANT RESPIRATORY (INHALATION) at 20:20

## 2021-02-13 RX ADMIN — DOXYCYCLINE 100 MG: 100 INJECTION, POWDER, LYOPHILIZED, FOR SOLUTION INTRAVENOUS at 22:56

## 2021-02-13 RX ADMIN — Medication 10 ML: at 15:38

## 2021-02-13 RX ADMIN — AMLODIPINE BESYLATE 2.5 MG: 5 TABLET ORAL at 10:01

## 2021-02-13 RX ADMIN — ESCITALOPRAM OXALATE 10 MG: 10 TABLET ORAL at 10:01

## 2021-02-13 RX ADMIN — ENOXAPARIN SODIUM 40 MG: 40 INJECTION SUBCUTANEOUS at 10:01

## 2021-02-13 RX ADMIN — METHYLPREDNISOLONE SODIUM SUCCINATE 60 MG: 125 INJECTION, POWDER, FOR SOLUTION INTRAMUSCULAR; INTRAVENOUS at 03:45

## 2021-02-13 RX ADMIN — Medication 20 ML: at 17:11

## 2021-02-13 RX ADMIN — Medication 10 ML: at 22:58

## 2021-02-13 RX ADMIN — DOXYCYCLINE 100 MG: 100 INJECTION, POWDER, LYOPHILIZED, FOR SOLUTION INTRAVENOUS at 11:21

## 2021-02-13 NOTE — PROGRESS NOTES
Keith Markham, & Trujillo    Admit Date: 2/10/2021    Subjective:     No new complaints. Current Facility-Administered Medications   Medication Dose Route Frequency    enoxaparin (LOVENOX) injection 40 mg  40 mg SubCUTAneous Q24H    albuterol-ipratropium (DUO-NEB) 2.5 MG-0.5 MG/3 ML  3 mL Nebulization BID RT    amLODIPine (NORVASC) tablet 2.5 mg  2.5 mg Oral DAILY    budesonide (PULMICORT) 500 mcg/2 ml nebulizer suspension  500 mcg Nebulization BID RT    methylPREDNISolone (PF) (SOLU-MEDROL) injection 60 mg  60 mg IntraVENous Q12H    sodium chloride (NS) flush 5-40 mL  5-40 mL IntraVENous Q8H    sodium chloride (NS) flush 5-40 mL  5-40 mL IntraVENous PRN    acetaminophen (TYLENOL) tablet 650 mg  650 mg Oral Q6H PRN    Or    acetaminophen (TYLENOL) suppository 650 mg  650 mg Rectal Q6H PRN    polyethylene glycol (MIRALAX) packet 17 g  17 g Oral DAILY PRN    promethazine (PHENERGAN) tablet 12.5 mg  12.5 mg Oral Q6H PRN    Or    ondansetron (ZOFRAN) injection 4 mg  4 mg IntraVENous Q6H PRN    escitalopram oxalate (LEXAPRO) tablet 10 mg  10 mg Oral DAILY    albuterol (PROVENTIL VENTOLIN) nebulizer solution 2.5 mg  2.5 mg Nebulization Q4H PRN    guaiFENesin-dextromethorphan (ROBITUSSIN DM) 100-10 mg/5 mL syrup 5 mL  5 mL Oral Q4H PRN    doxycycline (VIBRAMYCIN) 100 mg in 0.9% sodium chloride (MBP/ADV) 100 mL MBP  100 mg IntraVENous Q12H          Objective:     Patient Vitals for the past 8 hrs:   BP Temp Pulse Resp SpO2   02/13/21 0852 (!) 171/68 98.1 °F (36.7 °C) 68 16 97 %   02/13/21 0441 (!) 186/71 97.8 °F (36.6 °C) 75 16 93 %     No intake/output data recorded. No intake/output data recorded. Physical Exam: NAD. Alert. Probable dementia. Neck -- Supple. No JVD. Heart -- RRR. Lungs -- CTA. Abd -- Benign. Ext -- No LE edema, b/l. Data Review No results found for this or any previous visit (from the past 24 hour(s)). Assessment:     Principal Problem:    Pneumonia (2/10/2021)        Active Problems:    Dementia ([address] (11/10/2011)      HTN (hypertension) (6/18/2012)      Apparent aspiration        Plan:     1. Cont IV Doxy. 2. Can probably change to PO steroids tomorrow. 3. Cont ST & appropriate diet to decrease aspiration risk.   4. PT.      Dtr, [place] Bull -- [phone]        Kaylynn Luciano MD

## 2021-02-13 NOTE — CONSULTS
Patient is well known to my practice. I have reviewed her swallow study results as well as the remainder of her chart. It appears that her propulsion of food issues is primarily related to tongue/pharyngeal motor issues. She will need speech/swallow therapy for this. Patient can be discharged when all other criteria met per the attending provider and can follow up in my office within 2-4 weeks. If she does not respond well to the speech therapy she may need alternative means of feeding. A neurology visit as outpatient may be of benefit as well.     Jignesh Bailey MD

## 2021-02-14 LAB
ANION GAP SERPL CALC-SCNC: 6 MMOL/L (ref 5–15)
BASOPHILS # BLD: 0 K/UL (ref 0–0.1)
BASOPHILS NFR BLD: 0 % (ref 0–1)
BUN SERPL-MCNC: 35 MG/DL (ref 6–20)
BUN/CREAT SERPL: 32 (ref 12–20)
CALCIUM SERPL-MCNC: 9.1 MG/DL (ref 8.5–10.1)
CHLORIDE SERPL-SCNC: 113 MMOL/L (ref 97–108)
CO2 SERPL-SCNC: 23 MMOL/L (ref 21–32)
CREAT SERPL-MCNC: 1.09 MG/DL (ref 0.55–1.02)
DIFFERENTIAL METHOD BLD: ABNORMAL
EOSINOPHIL # BLD: 0 K/UL (ref 0–0.4)
EOSINOPHIL NFR BLD: 0 % (ref 0–7)
ERYTHROCYTE [DISTWIDTH] IN BLOOD BY AUTOMATED COUNT: 13.3 % (ref 11.5–14.5)
GLUCOSE SERPL-MCNC: 133 MG/DL (ref 65–100)
HCT VFR BLD AUTO: 41.1 % (ref 35–47)
HGB BLD-MCNC: 12.4 G/DL (ref 11.5–16)
IMM GRANULOCYTES # BLD AUTO: 0.2 K/UL (ref 0–0.04)
IMM GRANULOCYTES NFR BLD AUTO: 2 % (ref 0–0.5)
LYMPHOCYTES # BLD: 1.6 K/UL (ref 0.8–3.5)
LYMPHOCYTES NFR BLD: 12 % (ref 12–49)
MCH RBC QN AUTO: 28.4 PG (ref 26–34)
MCHC RBC AUTO-ENTMCNC: 30.2 G/DL (ref 30–36.5)
MCV RBC AUTO: 94.1 FL (ref 80–99)
MONOCYTES # BLD: 0.5 K/UL (ref 0–1)
MONOCYTES NFR BLD: 4 % (ref 5–13)
NEUTS SEG # BLD: 10.4 K/UL (ref 1.8–8)
NEUTS SEG NFR BLD: 82 % (ref 32–75)
NRBC # BLD: 0 K/UL (ref 0–0.01)
NRBC BLD-RTO: 0 PER 100 WBC
PLATELET # BLD AUTO: 262 K/UL (ref 150–400)
PMV BLD AUTO: 10.8 FL (ref 8.9–12.9)
POTASSIUM SERPL-SCNC: 4.2 MMOL/L (ref 3.5–5.1)
RBC # BLD AUTO: 4.37 M/UL (ref 3.8–5.2)
SODIUM SERPL-SCNC: 142 MMOL/L (ref 136–145)
WBC # BLD AUTO: 12.7 K/UL (ref 3.6–11)

## 2021-02-14 PROCEDURE — 74011250637 HC RX REV CODE- 250/637: Performed by: INTERNAL MEDICINE

## 2021-02-14 PROCEDURE — 74011000258 HC RX REV CODE- 258: Performed by: INTERNAL MEDICINE

## 2021-02-14 PROCEDURE — 65270000029 HC RM PRIVATE

## 2021-02-14 PROCEDURE — 74011636637 HC RX REV CODE- 636/637: Performed by: INTERNAL MEDICINE

## 2021-02-14 PROCEDURE — 94640 AIRWAY INHALATION TREATMENT: CPT

## 2021-02-14 PROCEDURE — 80048 BASIC METABOLIC PNL TOTAL CA: CPT

## 2021-02-14 PROCEDURE — 74011250636 HC RX REV CODE- 250/636: Performed by: INTERNAL MEDICINE

## 2021-02-14 PROCEDURE — 36415 COLL VENOUS BLD VENIPUNCTURE: CPT

## 2021-02-14 PROCEDURE — 74011000250 HC RX REV CODE- 250: Performed by: INTERNAL MEDICINE

## 2021-02-14 PROCEDURE — 85025 COMPLETE CBC W/AUTO DIFF WBC: CPT

## 2021-02-14 PROCEDURE — 74011000250 HC RX REV CODE- 250: Performed by: HOSPITALIST

## 2021-02-14 PROCEDURE — 94760 N-INVAS EAR/PLS OXIMETRY 1: CPT

## 2021-02-14 PROCEDURE — 94664 DEMO&/EVAL PT USE INHALER: CPT

## 2021-02-14 PROCEDURE — 74011250636 HC RX REV CODE- 250/636: Performed by: HOSPITALIST

## 2021-02-14 RX ORDER — AMLODIPINE BESYLATE 5 MG/1
5 TABLET ORAL DAILY
Status: DISCONTINUED | OUTPATIENT
Start: 2021-02-15 | End: 2021-02-17 | Stop reason: HOSPADM

## 2021-02-14 RX ORDER — AMLODIPINE BESYLATE 5 MG/1
2.5 TABLET ORAL
Status: COMPLETED | OUTPATIENT
Start: 2021-02-14 | End: 2021-02-14

## 2021-02-14 RX ADMIN — PREDNISONE 30 MG: 20 TABLET ORAL at 14:31

## 2021-02-14 RX ADMIN — ESCITALOPRAM OXALATE 10 MG: 10 TABLET ORAL at 10:32

## 2021-02-14 RX ADMIN — AMLODIPINE BESYLATE 2.5 MG: 5 TABLET ORAL at 14:31

## 2021-02-14 RX ADMIN — IPRATROPIUM BROMIDE AND ALBUTEROL SULFATE 3 ML: .5; 3 SOLUTION RESPIRATORY (INHALATION) at 20:21

## 2021-02-14 RX ADMIN — ENOXAPARIN SODIUM 40 MG: 40 INJECTION SUBCUTANEOUS at 10:32

## 2021-02-14 RX ADMIN — METHYLPREDNISOLONE SODIUM SUCCINATE 60 MG: 125 INJECTION, POWDER, FOR SOLUTION INTRAMUSCULAR; INTRAVENOUS at 03:20

## 2021-02-14 RX ADMIN — AMLODIPINE BESYLATE 2.5 MG: 5 TABLET ORAL at 10:31

## 2021-02-14 RX ADMIN — DOXYCYCLINE 100 MG: 100 INJECTION, POWDER, LYOPHILIZED, FOR SOLUTION INTRAVENOUS at 22:46

## 2021-02-14 RX ADMIN — BUDESONIDE 500 MCG: 0.5 INHALANT RESPIRATORY (INHALATION) at 20:21

## 2021-02-14 RX ADMIN — Medication 10 ML: at 22:04

## 2021-02-14 RX ADMIN — Medication 10 ML: at 14:18

## 2021-02-14 RX ADMIN — DOXYCYCLINE 100 MG: 100 INJECTION, POWDER, LYOPHILIZED, FOR SOLUTION INTRAVENOUS at 10:43

## 2021-02-14 NOTE — PROGRESS NOTES
Keith Leavitt, & Ronnie    Admit Date: 2/10/2021    Subjective:     No new complaints. BP's running high. Current Facility-Administered Medications   Medication Dose Route Frequency    [START ON 2/15/2021] amLODIPine (NORVASC) tablet 5 mg  5 mg Oral DAILY    amLODIPine (NORVASC) tablet 2.5 mg  2.5 mg Oral NOW    predniSONE (DELTASONE) tablet 30 mg  30 mg Oral DAILY    enoxaparin (LOVENOX) injection 40 mg  40 mg SubCUTAneous Q24H    albuterol-ipratropium (DUO-NEB) 2.5 MG-0.5 MG/3 ML  3 mL Nebulization BID RT    budesonide (PULMICORT) 500 mcg/2 ml nebulizer suspension  500 mcg Nebulization BID RT    sodium chloride (NS) flush 5-40 mL  5-40 mL IntraVENous Q8H    sodium chloride (NS) flush 5-40 mL  5-40 mL IntraVENous PRN    acetaminophen (TYLENOL) tablet 650 mg  650 mg Oral Q6H PRN    Or    acetaminophen (TYLENOL) suppository 650 mg  650 mg Rectal Q6H PRN    polyethylene glycol (MIRALAX) packet 17 g  17 g Oral DAILY PRN    promethazine (PHENERGAN) tablet 12.5 mg  12.5 mg Oral Q6H PRN    Or    ondansetron (ZOFRAN) injection 4 mg  4 mg IntraVENous Q6H PRN    escitalopram oxalate (LEXAPRO) tablet 10 mg  10 mg Oral DAILY    albuterol (PROVENTIL VENTOLIN) nebulizer solution 2.5 mg  2.5 mg Nebulization Q4H PRN    guaiFENesin-dextromethorphan (ROBITUSSIN DM) 100-10 mg/5 mL syrup 5 mL  5 mL Oral Q4H PRN    doxycycline (VIBRAMYCIN) 100 mg in 0.9% sodium chloride (MBP/ADV) 100 mL MBP  100 mg IntraVENous Q12H          Objective:     Patient Vitals for the past 8 hrs:   BP Temp Pulse Resp SpO2   02/14/21 1025 (!) 173/67 97.7 °F (36.5 °C) 65 18 95 %     No intake/output data recorded. 02/12 1901 - 02/14 0700  In: 300 [P.O.:300]  Out: 450 [Urine:450]    Physical Exam: NAD. Alert. Probable dementia. Neck -- Supple. No JVD. Heart -- RRR. Lungs -- CTA. Abd -- Benign. Ext -- No LE edema, b/l. Data Review   Recent Results (from the past 24 hour(s))   METABOLIC PANEL, BASIC    Collection Time: 02/14/21  3:05 AM   Result Value Ref Range    Sodium 142 136 - 145 mmol/L    Potassium 4.2 3.5 - 5.1 mmol/L    Chloride 113 (H) 97 - 108 mmol/L    CO2 23 21 - 32 mmol/L    Anion gap 6 5 - 15 mmol/L    Glucose 133 (H) 65 - 100 mg/dL    BUN 35 (H) 6 - 20 MG/DL    Creatinine 1.09 (H) 0.55 - 1.02 MG/DL    BUN/Creatinine ratio 32 (H) 12 - 20      GFR est AA 57 (L) >60 ml/min/1.73m2    GFR est non-AA 47 (L) >60 ml/min/1.73m2    Calcium 9.1 8.5 - 10.1 MG/DL   CBC WITH AUTOMATED DIFF    Collection Time: 02/14/21  3:05 AM   Result Value Ref Range    WBC 12.7 (H) 3.6 - 11.0 K/uL    RBC 4.37 3.80 - 5.20 M/uL    HGB 12.4 11.5 - 16.0 g/dL    HCT 41.1 35.0 - 47.0 %    MCV 94.1 80.0 - 99.0 FL    MCH 28.4 26.0 - 34.0 PG    MCHC 30.2 30.0 - 36.5 g/dL    RDW 13.3 11.5 - 14.5 %    PLATELET 821 395 - 840 K/uL    MPV 10.8 8.9 - 12.9 FL    NRBC 0.0 0  WBC    ABSOLUTE NRBC 0.00 0.00 - 0.01 K/uL    NEUTROPHILS 82 (H) 32 - 75 %    LYMPHOCYTES 12 12 - 49 %    MONOCYTES 4 (L) 5 - 13 %    EOSINOPHILS 0 0 - 7 %    BASOPHILS 0 0 - 1 %    IMMATURE GRANULOCYTES 2 (H) 0.0 - 0.5 %    ABS. NEUTROPHILS 10.4 (H) 1.8 - 8.0 K/UL    ABS. LYMPHOCYTES 1.6 0.8 - 3.5 K/UL    ABS. MONOCYTES 0.5 0.0 - 1.0 K/UL    ABS. EOSINOPHILS 0.0 0.0 - 0.4 K/UL    ABS. BASOPHILS 0.0 0.0 - 0.1 K/UL    ABS. IMM. GRANS. 0.2 (H) 0.00 - 0.04 K/UL    DF AUTOMATED             Assessment:     Principal Problem:    Pneumonia (2/10/2021)        Active Problems:    Dementia ([facility] [address] (11/10/2011)      HTN (hypertension) (6/18/2012)      Apparent aspiration      Mild leukocytosis felt due to steroids. Plan:     1. Cont IV Doxy. 2. Change steroids to Prednisone 30 mg PO every day. 3. Increase amlodipine from 2.5 to 5 mg every day. 4. Cont ST & appropriate diet to decrease aspiration risk.   5. PT.      D/w dtr, Melany Hood -- [phone]        Morelia Bowers MD

## 2021-02-14 NOTE — PROGRESS NOTES
Orders received, chart reviewed. Patient cleared by nursing. Attempted to see patient who started to become agitated as therapist introduced self and suggested getting out of the bed. Spoke with nursing. Will defer today and f/u tomorrow.  Sana Kinney, PT

## 2021-02-15 PROCEDURE — 74011000250 HC RX REV CODE- 250: Performed by: INTERNAL MEDICINE

## 2021-02-15 PROCEDURE — 74011250636 HC RX REV CODE- 250/636: Performed by: INTERNAL MEDICINE

## 2021-02-15 PROCEDURE — 74011636637 HC RX REV CODE- 636/637: Performed by: INTERNAL MEDICINE

## 2021-02-15 PROCEDURE — 74011000250 HC RX REV CODE- 250: Performed by: HOSPITALIST

## 2021-02-15 PROCEDURE — 74011250637 HC RX REV CODE- 250/637: Performed by: INTERNAL MEDICINE

## 2021-02-15 PROCEDURE — 74011250636 HC RX REV CODE- 250/636: Performed by: HOSPITALIST

## 2021-02-15 PROCEDURE — 94640 AIRWAY INHALATION TREATMENT: CPT

## 2021-02-15 PROCEDURE — 97161 PT EVAL LOW COMPLEX 20 MIN: CPT

## 2021-02-15 PROCEDURE — 97530 THERAPEUTIC ACTIVITIES: CPT

## 2021-02-15 PROCEDURE — 74011000258 HC RX REV CODE- 258: Performed by: INTERNAL MEDICINE

## 2021-02-15 PROCEDURE — 65270000029 HC RM PRIVATE

## 2021-02-15 RX ORDER — CARBOXYMETHYLCELLULOSE SODIUM 5 MG/ML
1 SOLUTION/ DROPS OPHTHALMIC AS NEEDED
Status: DISCONTINUED | OUTPATIENT
Start: 2021-02-15 | End: 2021-02-17 | Stop reason: HOSPADM

## 2021-02-15 RX ORDER — IPRATROPIUM BROMIDE AND ALBUTEROL SULFATE 2.5; .5 MG/3ML; MG/3ML
3 SOLUTION RESPIRATORY (INHALATION)
Status: DISCONTINUED | OUTPATIENT
Start: 2021-02-15 | End: 2021-02-17 | Stop reason: HOSPADM

## 2021-02-15 RX ADMIN — ESCITALOPRAM OXALATE 10 MG: 10 TABLET ORAL at 09:13

## 2021-02-15 RX ADMIN — ENOXAPARIN SODIUM 40 MG: 40 INJECTION SUBCUTANEOUS at 11:00

## 2021-02-15 RX ADMIN — IPRATROPIUM BROMIDE AND ALBUTEROL SULFATE 3 ML: .5; 3 SOLUTION RESPIRATORY (INHALATION) at 08:18

## 2021-02-15 RX ADMIN — PREDNISONE 30 MG: 20 TABLET ORAL at 09:13

## 2021-02-15 RX ADMIN — DOXYCYCLINE 100 MG: 100 INJECTION, POWDER, LYOPHILIZED, FOR SOLUTION INTRAVENOUS at 22:25

## 2021-02-15 RX ADMIN — Medication 10 ML: at 22:00

## 2021-02-15 RX ADMIN — Medication 10 ML: at 13:32

## 2021-02-15 RX ADMIN — DOXYCYCLINE 100 MG: 100 INJECTION, POWDER, LYOPHILIZED, FOR SOLUTION INTRAVENOUS at 11:00

## 2021-02-15 RX ADMIN — BUDESONIDE 500 MCG: 0.5 INHALANT RESPIRATORY (INHALATION) at 08:18

## 2021-02-15 RX ADMIN — AMLODIPINE BESYLATE 5 MG: 5 TABLET ORAL at 09:13

## 2021-02-15 RX ADMIN — IPRATROPIUM BROMIDE AND ALBUTEROL SULFATE 3 ML: .5; 3 SOLUTION RESPIRATORY (INHALATION) at 19:43

## 2021-02-15 RX ADMIN — BUDESONIDE 500 MCG: 0.5 INHALANT RESPIRATORY (INHALATION) at 19:43

## 2021-02-15 NOTE — PROGRESS NOTES
Problem: Mobility Impaired (Adult and Pediatric)  Goal: *Acute Goals and Plan of Care (Insert Text)  Description: FUNCTIONAL STATUS PRIOR TO ADMISSION: Patient required assistance for basic and instrumental ADLs and ambulated with assistance from her spouse. Pt is a poor historian and is unable to provide reliable information. HOME SUPPORT PRIOR TO ADMISSION: The patient lived with spouse who provided assistance with ADLs and mobility. Physical Therapy Goals  Initiated 2/15/2021  1. Patient will move from supine to sit and sit to supine  in bed with minimal assistance within 7 day(s). 2.  Patient will transfer from bed to chair and chair to bed with moderate assistance  using the least restrictive device within 7 day(s). 3.  Patient will perform sit to stand with moderate assistance  within 7 day(s). 4.  Patient will ambulate with moderate assistance  for 50 feet with the least restrictive device within 7 day(s). Outcome: Progressing Towards Goal   PHYSICAL THERAPY EVALUATION  Patient: Prince Garrison (67 y.o. female)  Date: 2/15/2021  Primary Diagnosis: Pneumonia [J18.9]        Precautions:   Fall      ASSESSMENT  Based on the objective data described below, the patient has a history of dementia and is unable to provide reliable background information. Pt presents with confusion, decreased ROM of BLE, BUE with L shoulder having significant loss of ROM, strength, and is non-functional.  Pt has impaired sitting and standing balance and significantly impaired functional mobility. Per chart review pt lives with her spouse who assist her with ambulation by providing hand hold assist and pt does not use an assistive device. Pt agreeable to participate with therapy to transfer to bedside chair with some coaxing. Pt required maximum assistance for bed mobility and total assist x 2 to stand and pivot to bedside chair with flexed posture, poor balance, and  minimal WB through BLE.  Pt remained up in chair with alarm activated and resumed eating her lunch at session end. Current Level of Function Impacting Discharge (mobility/balance): bed mobility maximum assist x 1, sit <> stand and bed to chair transfer with total assist    Functional Outcome Measure: The patient scored Total Score: 0/28 on the Tinetti outcome measure which is indicative of high fall risk. Other factors to consider for discharge: PMH, high fall risk, needs 2 person assistance at this time     Patient will benefit from skilled therapy intervention to address the above noted impairments. PLAN :  Recommendations and Planned Interventions: bed mobility training, transfer training, gait training, and patient and family training/education      Frequency/Duration: Patient will be followed by physical therapy:  3 times a week to address goals. Recommendation for discharge: (in order for the patient to meet his/her long term goals)  Therapy up to 5 days/week in SNF setting or intensive home health therapy program, pt may need 2 person assistance for mobility if she returns home    This discharge recommendation:  Has not yet been discussed the attending provider and/or case management    IF patient discharges home will need the following DME: to be determined (TBD)         SUBJECTIVE:   Patient stated Lenny Zamora.     OBJECTIVE DATA SUMMARY:   HISTORY:    Past Medical History:   Diagnosis Date    Alcoholism ([facility] [address] 11/10/2011    Arthritis     Dementia     Dementia ([facility] [address] 11/10/2011    Depression 11/10/2011    Diverticulosis 1/27/2014    HTN (hypertension) 6/18/2012    Hyperlipidemia 6/24/2012    Hypertension     Hypovitaminosis D 12/17/2013    Intraventricular hemorrhage, nontraumatic ([facility] [address] 6/18/2012    Mass of pancreas 1/27/2014    1.5 CM DENSITY POST HEAD OF PANCREAS ON CT 1/27/2014    Osteoporosis 12/16/2013    Other ill-defined conditions(799.89)     broken right shoulder    Stroke [facility])      Past Surgical History:   Procedure Laterality Date    HX APPENDECTOMY      HX CATARACT REMOVAL      HX CHOLECYSTECTOMY      HX HYSTERECTOMY      HX ORTHOPAEDIC      repair lt shoulder fx       Personal factors and/or comorbidities impacting plan of care: lives with spouse who assist pt with ADLs and mobility, did not use an assistive device for ambulation but required assistance from her     Home Situation  Home Environment: Private residence  One/Two Story Residence: Two story(one story home wiht basement)  Lift Chair Available: Yes(uses chair lift from basement to main level of home)  Living Alone: No  Support Systems: Spouse/Significant Other/Partner  Patient Expects to be Discharged to[de-identified] Private residence    EXAMINATION/PRESENTATION/DECISION MAKING:   Critical Behavior:  Neurologic State: Alert, Confused  Orientation Level: Disoriented to place, Disoriented to situation, Disoriented to time, Oriented to person  Cognition: Impaired decision making, Memory loss, Follows commands, Poor safety awareness  Safety/Judgement: Decreased awareness of need for assistance, Decreased awareness of need for safety, Decreased insight into deficits       Skin:  intact  Edema: none  Range Of Motion:  AROM: Generally decreased, functional, L shoulder with minimal active ROM, non-functional, per chart review fractured L shoulder ~ 10 years ago           PROM: Generally decreased, functional, L shoulder limited significantly            Strength:    Strength: Generally decreased, functional, LUE non-functional                     Tone & Sensation:   Tone: Normal              Sensation: Intact               Coordination:  Coordination: Generally decreased, functional       Functional Mobility:  Bed Mobility:     Supine to Sit: Maximum assistance;Assist x1, decreased sitting balance     Scooting: Total assistance;Assist x2  Transfers:  Sit to Stand:  Total assistance;Assist x2  Stand to Sit: Total assistance;Assist x2        Bed to Chair: Total assistance;Assist x2, flexed posture with poor balance and minimal WB through BLE while pivoted to chair               Balance:   Sitting: Impaired  Sitting - Static: Fair (occasional)  Sitting - Dynamic: Poor (constant support)  Standing: Impaired  Standing - Static: Poor;Constant support  Standing - Dynamic : Poor;Constant support    Functional Measure:  Tinetti test:    Sitting Balance: 0  Arises: 0  Attempts to Rise: 0  Immediate Standing Balance: 0  Standing Balance: 0  Nudged: 0  Eyes Closed: 0  Turn 360 Degrees - Continuous/Discontinuous: 0  Turn 360 Degrees - Steady/Unsteady: 0  Sitting Down: 0  Balance Score: 0 Balance total score  Indication of Gait: 0  R Step Length/Height: 0  L Step Length/Height: 0  R Foot Clearance: 0  L Foot Clearance: 0  Step Symmetry: 0  Step Continuity: 0  Path: 0  Trunk: 0  Walking Time: 0  Gait Score: 0 Gait total score  Total Score: 0/28 Overall total score         Tinetti Tool Score Risk of Falls  <19 = High Fall Risk  19-24 = Moderate Fall Risk  25-28 = Low Fall Risk  Tinetti ME. Performance-Oriented Assessment of Mobility Problems in Elderly Patients. [address]; [id number].  (Scoring Description: PT Bulletin Feb. 10, 1993)    Older adults: Yani Haider et al, 2009; n = 1000 [facility] elderly evaluated with ABC, MARTHA, ADL, and IADL)  · Mean MARTHA score for males aged 69-68 years = 26.21(3.40)  · Mean MARTHA score for females age 69-68 years = 25.16(4.30)  · Mean MARTHA score for males over 80 years = 23.29(6.02)  · Mean MARTHA score for females over 80 years = 17.20(8.32)        Physical Therapy Evaluation Charge Determination   History Examination Presentation Decision-Making   HIGH Complexity :3+ comorbidities / personal factors will impact the outcome/ POC  HIGH Complexity : 4+ Standardized tests and measures addressing body structure, function, activity limitation and / or participation in recreation  MEDIUM Complexity : Evolving with changing characteristics  MEDIUM Complexity : FOTO score of 26-74      Based on the above components, the patient evaluation is determined to be of the following complexity level: MEDIUM    Pain Rating:  Verbal: no complaints of pain    Activity Tolerance:   Fair      After treatment patient left in no apparent distress:   Sitting in chair, Call bell within reach, and Bed / chair alarm activated    COMMUNICATION/EDUCATION:   The patients plan of care was discussed with: Registered nurse. Fall prevention education was provided and the patient/caregiver indicated understanding. and Patient is unable to participate in goal setting and plan of care.     Thank you for this referral.  Shira Vides   Time Calculation: 16 mins

## 2021-02-15 NOTE — PROGRESS NOTES
Name: Malina Albarado: Diana David 55   : 3/16/1931 Admit Date: 2/10/2021   Phone: [phone]  Room: [facility]   PCP: Debra Pozo MD  MRN: [id number]   Date: 2/15/2021  Code: Full Code        HPI:    2/15  On room air  Notes feeling ok. No congestion        No breathing issues at this time  On room air       12:57 PM       History was obtained from patient and medical records. I was asked by Ev Shelton MD to see Kim Mckoy in consultation for a chief complaint of hypoxemia. History of Present Illness: 80year old female with past medical history as given below who presented to [facility] with increased cough and noted to have hypoxemia in the ED and subsequently admitted. Patient is a poor historian and denies any cough, shortness of breath or fever to me. Per notes she reportedly had cough for the past 4 days and her O2 levels dropped to 70%. She saw her pcp and subsequently was referred to [facility] for admission. Patient is a former smoker 1 ppd x 50 years. Ct Chest reviewed - LLL infiltrate vs atelectasis. Mucous in trachea and LLL bronchial thickening. Eosinophils 6%. Started on decadron 6 mg iv q 24 and Doxycycline. PCT 0.08  COVID-19 pending.     Past Medical History:   Diagnosis Date    Alcoholism ([facility] [address] 11/10/2011    Arthritis     Dementia     Dementia ([facility] [address] 11/10/2011    Depression 11/10/2011    Diverticulosis 2014    HTN (hypertension) 2012    Hyperlipidemia 2012    Hypertension     Hypovitaminosis D 2013    Intraventricular hemorrhage, nontraumatic ([facility] [address] 2012    Mass of pancreas 2014    1.5 CM DENSITY POST HEAD OF PANCREAS ON CT 2014    Osteoporosis 2013    Other ill-defined conditions(799.89)     broken right shoulder    Stroke [facility])        Past Surgical History:   Procedure Laterality Date    HX APPENDECTOMY      HX CATARACT REMOVAL      HX CHOLECYSTECTOMY      HX HYSTERECTOMY      HX ORTHOPAEDIC      repair lt shoulder fx       History reviewed. No pertinent family history. Social History     Tobacco Use    Smoking status: Former Smoker     Packs/day: 1.00     Years: 50.00     Pack years: 50.00   Substance Use Topics    Alcohol use: Yes     Comment: dtrs state amount unknown       Allergies   Allergen Reactions    Codeine Hives     Pt cannot really remember allergy, uncertain of reactioni    Ibuprofen Other (comments)     Abn LFT'S & FEVER    Pcn [Penicillins] Hives       Current Facility-Administered Medications   Medication Dose Route Frequency    carboxymethylcellulose sodium (REFRESH PLUS) 0.5 % ophthalmic solution 1 Drop  1 Drop Both Eyes PRN    amLODIPine (NORVASC) tablet 5 mg  5 mg Oral DAILY    predniSONE (DELTASONE) tablet 30 mg  30 mg Oral DAILY    enoxaparin (LOVENOX) injection 40 mg  40 mg SubCUTAneous Q24H    albuterol-ipratropium (DUO-NEB) 2.5 MG-0.5 MG/3 ML  3 mL Nebulization BID RT    budesonide (PULMICORT) 500 mcg/2 ml nebulizer suspension  500 mcg Nebulization BID RT    sodium chloride (NS) flush 5-40 mL  5-40 mL IntraVENous Q8H    sodium chloride (NS) flush 5-40 mL  5-40 mL IntraVENous PRN    acetaminophen (TYLENOL) tablet 650 mg  650 mg Oral Q6H PRN    Or    acetaminophen (TYLENOL) suppository 650 mg  650 mg Rectal Q6H PRN    polyethylene glycol (MIRALAX) packet 17 g  17 g Oral DAILY PRN    promethazine (PHENERGAN) tablet 12.5 mg  12.5 mg Oral Q6H PRN    Or    ondansetron (ZOFRAN) injection 4 mg  4 mg IntraVENous Q6H PRN    escitalopram oxalate (LEXAPRO) tablet 10 mg  10 mg Oral DAILY    albuterol (PROVENTIL VENTOLIN) nebulizer solution 2.5 mg  2.5 mg Nebulization Q4H PRN    guaiFENesin-dextromethorphan (ROBITUSSIN DM) 100-10 mg/5 mL syrup 5 mL  5 mL Oral Q4H PRN    doxycycline (VIBRAMYCIN) 100 mg in 0.9% sodium chloride (MBP/ADV) 100 mL MBP  100 mg IntraVENous Q12H         REVIEW OF SYSTEMS   Negative except as stated in the HPI.       Physical Exam: Visit Vitals  BP (!) 160/80   Pulse 71   Temp (!) 96.4 °F (35.8 °C)   Resp 18   Ht 5' 2\" (1.575 m)   Wt 62.4 kg (137 lb 8 oz)   SpO2 97%   BMI 25.15 kg/m²       General:  Alert, cooperative. Head:  Normocephalic. Eyes:  Conjunctivae/corneas clear. Nose: Nares normal. Septum midline. Throat: Lips, mucosa, and tongue normal. Teeth and gums normal.   Neck: Supple, symmetrical, trachea midline. Lungs:   Decreased air entry at the bases. Heart:  Regular rate and rhythm, S1, S2 normal, no murmur, click, rub or gallop. Abdomen:   Soft, non-tender. Bowel sounds normal. No masses,  No organomegaly. Extremities: No edema.        Skin: Skin color, texture, turgor normal. No rashes or lesions       Neurologic: Grossly nonfocal       Lab Results   Component Value Date/Time    Sodium 142 02/14/2021 03:05 AM    Potassium 4.2 02/14/2021 03:05 AM    Chloride 113 (H) 02/14/2021 03:05 AM    CO2 23 02/14/2021 03:05 AM    BUN 35 (H) 02/14/2021 03:05 AM    Creatinine 1.09 (H) 02/14/2021 03:05 AM    Glucose 133 (H) 02/14/2021 03:05 AM    Calcium 9.1 02/14/2021 03:05 AM    Magnesium 1.7 01/17/2014 07:04 PM    Phosphorus 3.3 06/19/2012 04:00 AM    Lactic acid 1.2 04/30/2020 12:16 AM       Lab Results   Component Value Date/Time    WBC 12.7 (H) 02/14/2021 03:05 AM    HGB 12.4 02/14/2021 03:05 AM    PLATELET 375 50/45/6232 03:05 AM    MCV 94.1 02/14/2021 03:05 AM       Lab Results   Component Value Date/Time    Color YELLOW/STRAW 02/10/2021 03:33 PM    Appearance CLEAR 02/10/2021 03:33 PM    pH (UA) 5.0 02/10/2021 03:33 PM    Protein TRACE (A) 02/10/2021 03:33 PM    Glucose Negative 02/10/2021 03:33 PM    Ketone Negative 02/10/2021 03:33 PM    Bilirubin Negative 02/10/2021 03:33 PM    Blood Negative 02/10/2021 03:33 PM    Urobilinogen 0.2 02/10/2021 03:33 PM    Nitrites Negative 02/10/2021 03:33 PM    Leukocyte Esterase TRACE (A) 02/10/2021 03:33 PM    WBC 5-10 02/10/2021 03:33 PM    RBC 0-5 02/10/2021 03:33 PM Bacteria Negative 02/10/2021 03:33 PM       IMPRESSION:  ==========  -LLL pneumonia. -Bilateral Lower lobe L > R bronchial wall thickening with mucous with elevated eosinophils - asthma?  -Eosinophilia. -COPD with 50 pack year smoking hx.  -Former smoker. PLAN:  =====  -PO steroids over the next 7 days  -MBS showed thin barium aspiration, thicker materials had flash penetration without aspiration; SLP recommending dysphagia 2 diet  -on doxy, complete ten days   -IgE 11 and RAST all negative.  We can plan to retest this a month following steroid cessation   -ICS/LABA agent.  -can be discharged soon from pulmonary aspect; follow up chest x-ray in 4-6 wks with Dr Josee Jiang PA-C

## 2021-02-15 NOTE — PROGRESS NOTES
Medical Progress Note      NAME: David Riley   :  3/16/1931  MRM:  [id number]    Date/Time: 2/15/2021           Problem List:     Principal Problem:    Pneumonia (2/10/2021)    Active Problems:    Dementia ([address] (11/10/2011)      HTN (hypertension) (2012)             Subjective:     Mild dry eyes. O/w no new complaints    Past Medical History:   Diagnosis Date    Alcoholism ([address] 11/10/2011    Arthritis     Dementia     Dementia ([facility] [address] 11/10/2011    Depression 11/10/2011    Diverticulosis 2014    HTN (hypertension) 2012    Hyperlipidemia 2012    Hypertension     Hypovitaminosis D 2013    Intraventricular hemorrhage, nontraumatic ([facility] [address] 2012    Mass of pancreas 2014    1.5 CM DENSITY POST HEAD OF PANCREAS ON CT 2014    Osteoporosis 2013    Other ill-defined conditions(799.89)     broken right shoulder    Stroke ([facility] [address]             Objective:         Vitals:      Last 24hrs VS reviewed since prior progress note. Most recent are:    Visit Vitals  BP (!) 160/80   Pulse 71   Temp (!) 96.4 °F (35.8 °C)   Resp 18   Ht 5' 2\" (1.575 m)   Wt 137 lb 8 oz (62.4 kg)   SpO2 96%   BMI 25.15 kg/m²     SpO2 Readings from Last 6 Encounters:   02/15/21 96%   02/10/21 97%   21 99%   21 95%   20 96%   19 97%            Intake/Output Summary (Last 24 hours) at 2/15/2021 0801  Last data filed at 2021 1426  Gross per 24 hour   Intake    Output 400 ml   Net -400 ml                  Exam:      General:  Alert, cooperative, no distress, appears stated age. ENT: conjuntivae mildly dry appearing  Pupils both react to light   EOMI    Lungs:   Generally clear to auscultation bilaterally. Heart:  Regular rate and rhythm, S1, S2 normal, no murmur, click, rub or gallop. Abdomen:   Soft, non-tender. Bowel sounds normal. No masses,  No organomegaly. Extremities: No pedal edema.      Lab Data Reviewed: (see below)  No results found for this or any previous visit (from the past 24 hour(s)). Medications Reviewed: (see below)    ______________________________________________________________________    Medications:     Current Facility-Administered Medications   Medication Dose Route Frequency    carboxymethylcellulose sodium (REFRESH PLUS) 0.5 % ophthalmic solution 1 Drop  1 Drop Both Eyes PRN    amLODIPine (NORVASC) tablet 5 mg  5 mg Oral DAILY    predniSONE (DELTASONE) tablet 30 mg  30 mg Oral DAILY    enoxaparin (LOVENOX) injection 40 mg  40 mg SubCUTAneous Q24H    albuterol-ipratropium (DUO-NEB) 2.5 MG-0.5 MG/3 ML  3 mL Nebulization BID RT    budesonide (PULMICORT) 500 mcg/2 ml nebulizer suspension  500 mcg Nebulization BID RT    sodium chloride (NS) flush 5-40 mL  5-40 mL IntraVENous Q8H    sodium chloride (NS) flush 5-40 mL  5-40 mL IntraVENous PRN    acetaminophen (TYLENOL) tablet 650 mg  650 mg Oral Q6H PRN    Or    acetaminophen (TYLENOL) suppository 650 mg  650 mg Rectal Q6H PRN    polyethylene glycol (MIRALAX) packet 17 g  17 g Oral DAILY PRN    promethazine (PHENERGAN) tablet 12.5 mg  12.5 mg Oral Q6H PRN    Or    ondansetron (ZOFRAN) injection 4 mg  4 mg IntraVENous Q6H PRN    escitalopram oxalate (LEXAPRO) tablet 10 mg  10 mg Oral DAILY    albuterol (PROVENTIL VENTOLIN) nebulizer solution 2.5 mg  2.5 mg Nebulization Q4H PRN    guaiFENesin-dextromethorphan (ROBITUSSIN DM) 100-10 mg/5 mL syrup 5 mL  5 mL Oral Q4H PRN    doxycycline (VIBRAMYCIN) 100 mg in 0.9% sodium chloride (MBP/ADV) 100 mL MBP  100 mg IntraVENous Q12H                   Assessment:     Apparent Aspiration responding to Doxycycline , Nebs, and steroids     Continue ST. ENT, Pulm input appreciated. Dry eyes. Add refresh eye drops prn     HTN under tx.      Patient Active Problem List   Diagnosis Code    Right hip pain M25.551    Dementia (HCC) F03.90    Depression F32.9    Intraventricular hemorrhage, nontraumatic (HCC) I61.5    HTN (hypertension) I10    Hyperlipidemia E78.5    Alkaline phosphatase elevation R74.8    Altered mental status R41.82    Low back pain M54.5    Osteoporosis M81.0    Hypovitaminosis D E55.9    GI bleed K92.2    Rectal bleeding K62.5    Mass of pancreas K86.89    Diverticulosis K57.90    Pneumonia J18.9          Plan:     I spoke with dtr Hector Grijalva. Pt is scheduled for 2nd COVID vaccine this Thursday. Pt is on steroids.                                                         ___________________________________________________      Attending Physician: Mikala Goddard MD

## 2021-02-16 LAB
BACTERIA SPEC CULT: NORMAL
GLUCOSE BLD STRIP.AUTO-MCNC: 154 MG/DL (ref 65–100)
SERVICE CMNT-IMP: ABNORMAL
SERVICE CMNT-IMP: NORMAL

## 2021-02-16 PROCEDURE — 94640 AIRWAY INHALATION TREATMENT: CPT

## 2021-02-16 PROCEDURE — 74011250637 HC RX REV CODE- 250/637: Performed by: INTERNAL MEDICINE

## 2021-02-16 PROCEDURE — 74011000250 HC RX REV CODE- 250: Performed by: INTERNAL MEDICINE

## 2021-02-16 PROCEDURE — 82962 GLUCOSE BLOOD TEST: CPT

## 2021-02-16 PROCEDURE — 74011000258 HC RX REV CODE- 258: Performed by: INTERNAL MEDICINE

## 2021-02-16 PROCEDURE — 77010033678 HC OXYGEN DAILY

## 2021-02-16 PROCEDURE — 74011250636 HC RX REV CODE- 250/636: Performed by: INTERNAL MEDICINE

## 2021-02-16 PROCEDURE — 65270000029 HC RM PRIVATE

## 2021-02-16 PROCEDURE — 74011636637 HC RX REV CODE- 636/637: Performed by: INTERNAL MEDICINE

## 2021-02-16 PROCEDURE — 92526 ORAL FUNCTION THERAPY: CPT

## 2021-02-16 PROCEDURE — 74011250636 HC RX REV CODE- 250/636: Performed by: HOSPITALIST

## 2021-02-16 PROCEDURE — 94664 DEMO&/EVAL PT USE INHALER: CPT

## 2021-02-16 RX ORDER — PREDNISONE 20 MG/1
20 TABLET ORAL DAILY
Status: DISCONTINUED | OUTPATIENT
Start: 2021-02-16 | End: 2021-02-17 | Stop reason: HOSPADM

## 2021-02-16 RX ADMIN — BUDESONIDE 500 MCG: 0.5 INHALANT RESPIRATORY (INHALATION) at 22:30

## 2021-02-16 RX ADMIN — ESCITALOPRAM OXALATE 10 MG: 10 TABLET ORAL at 09:25

## 2021-02-16 RX ADMIN — Medication 10 ML: at 13:44

## 2021-02-16 RX ADMIN — ENOXAPARIN SODIUM 40 MG: 40 INJECTION SUBCUTANEOUS at 10:57

## 2021-02-16 RX ADMIN — BUDESONIDE 500 MCG: 0.5 INHALANT RESPIRATORY (INHALATION) at 09:22

## 2021-02-16 RX ADMIN — DOXYCYCLINE 100 MG: 100 INJECTION, POWDER, LYOPHILIZED, FOR SOLUTION INTRAVENOUS at 22:37

## 2021-02-16 RX ADMIN — AMLODIPINE BESYLATE 5 MG: 5 TABLET ORAL at 09:25

## 2021-02-16 RX ADMIN — DOXYCYCLINE 100 MG: 100 INJECTION, POWDER, LYOPHILIZED, FOR SOLUTION INTRAVENOUS at 10:56

## 2021-02-16 RX ADMIN — Medication 10 ML: at 06:00

## 2021-02-16 RX ADMIN — PREDNISONE 20 MG: 20 TABLET ORAL at 09:25

## 2021-02-16 NOTE — PROGRESS NOTES
STEPHAN-TBD  RUR-16% Low    CM continuing to follow for transitional care planning needs. Patient recommended for SNF, CM to follow up with patient/family regarding choice.      Alejandrina Goodson MS

## 2021-02-16 NOTE — PROGRESS NOTES
Medical Progress Note      NAME: Edy Harrell   :  3/16/1931  MRM:  [id number]    Date/Time: 2021           Problem List:     Principal Problem:    Pneumonia (2/10/2021)    Active Problems:    Dementia ([address] (11/10/2011)      HTN (hypertension) (2012)             Subjective:     Breathing ok . Little cough     Past Medical History:   Diagnosis Date    Alcoholism ([address] 11/10/2011    Arthritis     Dementia     Dementia ([facility] [address] 11/10/2011    Depression 11/10/2011    Diverticulosis 2014    HTN (hypertension) 2012    Hyperlipidemia 2012    Hypertension     Hypovitaminosis D 2013    Intraventricular hemorrhage, nontraumatic ([address] 2012    Mass of pancreas 2014    1.5 CM DENSITY POST HEAD OF PANCREAS ON CT 2014    Osteoporosis 2013    Other ill-defined conditions(799.89)     broken right shoulder    Stroke ([facility] [address]             Objective:         Vitals:      Last 24hrs VS reviewed since prior progress note. Most recent are:    Visit Vitals  /82 (BP 1 Location: Left lower arm, BP Patient Position: At rest)   Pulse 77   Temp 97.7 °F (36.5 °C)   Resp 18   Ht 5' 2\" (1.575 m)   Wt 137 lb 8 oz (62.4 kg)   SpO2 95%   BMI 25.15 kg/m²     SpO2 Readings from Last 6 Encounters:   21 95%   02/10/21 97%   21 99%   21 95%   20 96%   19 97%        No intake or output data in the 24 hours ending 21 9461               Exam:      General:  Alert, cooperative, no distress, appears stated age. Lungs:   Generally clear to auscultation bilaterally. Heart:  Regular rate and rhythm, S1, S2 normal, no murmur, click, rub or gallop. Abdomen:   Bs+ soft, nt    Extremities:  no pedal edema    Awake , confused. Lab Data Reviewed: (see below)  No results found for this or any previous visit (from the past 24 hour(s)).     Medications Reviewed: (see below)    ______________________________________________________________________    Medications:     Current Facility-Administered Medications   Medication Dose Route Frequency    predniSONE (DELTASONE) tablet 20 mg  20 mg Oral DAILY    carboxymethylcellulose sodium (REFRESH PLUS) 0.5 % ophthalmic solution 1 Drop  1 Drop Both Eyes PRN    albuterol-ipratropium (DUO-NEB) 2.5 MG-0.5 MG/3 ML  3 mL Nebulization Q6H PRN    amLODIPine (NORVASC) tablet 5 mg  5 mg Oral DAILY    enoxaparin (LOVENOX) injection 40 mg  40 mg SubCUTAneous Q24H    budesonide (PULMICORT) 500 mcg/2 ml nebulizer suspension  500 mcg Nebulization BID RT    sodium chloride (NS) flush 5-40 mL  5-40 mL IntraVENous Q8H    sodium chloride (NS) flush 5-40 mL  5-40 mL IntraVENous PRN    acetaminophen (TYLENOL) tablet 650 mg  650 mg Oral Q6H PRN    Or    acetaminophen (TYLENOL) suppository 650 mg  650 mg Rectal Q6H PRN    polyethylene glycol (MIRALAX) packet 17 g  17 g Oral DAILY PRN    promethazine (PHENERGAN) tablet 12.5 mg  12.5 mg Oral Q6H PRN    Or    ondansetron (ZOFRAN) injection 4 mg  4 mg IntraVENous Q6H PRN    escitalopram oxalate (LEXAPRO) tablet 10 mg  10 mg Oral DAILY    albuterol (PROVENTIL VENTOLIN) nebulizer solution 2.5 mg  2.5 mg Nebulization Q4H PRN    guaiFENesin-dextromethorphan (ROBITUSSIN DM) 100-10 mg/5 mL syrup 5 mL  5 mL Oral Q4H PRN    doxycycline (VIBRAMYCIN) 100 mg in 0.9% sodium chloride (MBP/ADV) 100 mL MBP  100 mg IntraVENous Q12H                   Assessment:   PNA responding to tx  COPD. Can reduce Pred dose .  Continue nebs   Dementia  HTN  Improved control   Patient Active Problem List   Diagnosis Code    Right hip pain M25.551    Dementia (HCC) F03.90    Depression F32.9    Intraventricular hemorrhage, nontraumatic (HCC) I61.5    HTN (hypertension) I10    Hyperlipidemia E78.5    Alkaline phosphatase elevation R74.8    Altered mental status R41.82    Low back pain M54.5    Osteoporosis M81.0    Hypovitaminosis D E55.9    GI bleed K92.2    Rectal bleeding K62.5    Mass of pancreas K86.89    Diverticulosis K57.90    Pneumonia J18.9          Plan:     I left VM with an update  for dtr Amy Burnette and asked her  to call me                                                       ___________________________________________________      Attending Physician: Nelli Whitt MD

## 2021-02-16 NOTE — PROGRESS NOTES
Name: Corby Goes: Diana David 55   : 3/16/1931 Admit Date: 2/10/2021   Phone: [phone]  Room: [facility]   PCP: Diamante Gonzalez MD  MRN: [id number]   Date: 2021  Code: Full Code        HPI:      On room air  stable    2/15  On room air  Notes feeling ok. No congestion        No breathing issues at this time  On room air       12:57 PM       History was obtained from patient and medical records. I was asked by Jose Hanson MD to see Damianmemo Kim in consultation for a chief complaint of hypoxemia. History of Present Illness: 80year old female with past medical history as given below who presented to [facility] with increased cough and noted to have hypoxemia in the ED and subsequently admitted. Patient is a poor historian and denies any cough, shortness of breath or fever to me. Per notes she reportedly had cough for the past 4 days and her O2 levels dropped to 70%. She saw her pcp and subsequently was referred to [facility] for admission. Patient is a former smoker 1 ppd x 50 years. Ct Chest reviewed - LLL infiltrate vs atelectasis. Mucous in trachea and LLL bronchial thickening. Eosinophils 6%. Started on decadron 6 mg iv q 24 and Doxycycline. PCT 0.08  COVID-19 pending.     Past Medical History:   Diagnosis Date    [facility]ism ([facility] [address] 11/10/2011    Arthritis     Dementia     Dementia ([facility] [address] 11/10/2011    Depression 11/10/2011    Diverticulosis 2014    HTN (hypertension) 2012    Hyperlipidemia 2012    Hypertension     Hypovitaminosis D 2013    Intraventricular hemorrhage, nontraumatic ([address] 2012    Mass of pancreas 2014    1.5 CM DENSITY POST HEAD OF PANCREAS ON CT 2014    Osteoporosis 2013    Other ill-defined conditions(799.89)     broken right shoulder    Stroke [facility])        Past Surgical History:   Procedure Laterality Date    HX APPENDECTOMY      HX CATARACT REMOVAL      HX CHOLECYSTECTOMY  HX HYSTERECTOMY      HX ORTHOPAEDIC      repair lt shoulder fx       History reviewed. No pertinent family history.     Social History     Tobacco Use    Smoking status: Former Smoker     Packs/day: 1.00     Years: 50.00     Pack years: 50.00   Substance Use Topics    Alcohol use: Yes     Comment: dtrs state amount unknown       Allergies   Allergen Reactions    Codeine Hives     Pt cannot really remember allergy, uncertain of reactioni    Ibuprofen Other (comments)     Abn LFT'S & FEVER    Pcn [Penicillins] Hives       Current Facility-Administered Medications   Medication Dose Route Frequency    predniSONE (DELTASONE) tablet 20 mg  20 mg Oral DAILY    carboxymethylcellulose sodium (REFRESH PLUS) 0.5 % ophthalmic solution 1 Drop  1 Drop Both Eyes PRN    albuterol-ipratropium (DUO-NEB) 2.5 MG-0.5 MG/3 ML  3 mL Nebulization Q6H PRN    amLODIPine (NORVASC) tablet 5 mg  5 mg Oral DAILY    enoxaparin (LOVENOX) injection 40 mg  40 mg SubCUTAneous Q24H    budesonide (PULMICORT) 500 mcg/2 ml nebulizer suspension  500 mcg Nebulization BID RT    sodium chloride (NS) flush 5-40 mL  5-40 mL IntraVENous Q8H    sodium chloride (NS) flush 5-40 mL  5-40 mL IntraVENous PRN    acetaminophen (TYLENOL) tablet 650 mg  650 mg Oral Q6H PRN    Or    acetaminophen (TYLENOL) suppository 650 mg  650 mg Rectal Q6H PRN    polyethylene glycol (MIRALAX) packet 17 g  17 g Oral DAILY PRN    promethazine (PHENERGAN) tablet 12.5 mg  12.5 mg Oral Q6H PRN    Or    ondansetron (ZOFRAN) injection 4 mg  4 mg IntraVENous Q6H PRN    escitalopram oxalate (LEXAPRO) tablet 10 mg  10 mg Oral DAILY    albuterol (PROVENTIL VENTOLIN) nebulizer solution 2.5 mg  2.5 mg Nebulization Q4H PRN    guaiFENesin-dextromethorphan (ROBITUSSIN DM) 100-10 mg/5 mL syrup 5 mL  5 mL Oral Q4H PRN    doxycycline (VIBRAMYCIN) 100 mg in 0.9% sodium chloride (MBP/ADV) 100 mL MBP  100 mg IntraVENous Q12H         REVIEW OF SYSTEMS   Negative except as stated in the HPI. Physical Exam:   Visit Vitals  BP (!) 162/79   Pulse 76   Temp 97.9 °F (36.6 °C)   Resp 18   Ht 5' 2\" (1.575 m)   Wt 62.4 kg (137 lb 8 oz)   SpO2 98%   BMI 25.15 kg/m²       General:  Alert, cooperative. Head:  Normocephalic. Eyes:  Conjunctivae/corneas clear. Nose: Nares normal. Septum midline. Throat: Lips, mucosa, and tongue normal. Teeth and gums normal.   Neck: Supple, symmetrical, trachea midline. Lungs:   Decreased air entry at the bases. Heart:  Regular rate and rhythm, S1, S2 normal, no murmur, click, rub or gallop. Abdomen:   Soft, non-tender. Bowel sounds normal. No masses,  No organomegaly. Extremities: No edema.        Skin: Skin color, texture, turgor normal. No rashes or lesions       Neurologic: Grossly nonfocal       Lab Results   Component Value Date/Time    Sodium 142 02/14/2021 03:05 AM    Potassium 4.2 02/14/2021 03:05 AM    Chloride 113 (H) 02/14/2021 03:05 AM    CO2 23 02/14/2021 03:05 AM    BUN 35 (H) 02/14/2021 03:05 AM    Creatinine 1.09 (H) 02/14/2021 03:05 AM    Glucose 133 (H) 02/14/2021 03:05 AM    Calcium 9.1 02/14/2021 03:05 AM    Magnesium 1.7 01/17/2014 07:04 PM    Phosphorus 3.3 06/19/2012 04:00 AM    Lactic acid 1.2 04/30/2020 12:16 AM       Lab Results   Component Value Date/Time    WBC 12.7 (H) 02/14/2021 03:05 AM    HGB 12.4 02/14/2021 03:05 AM    PLATELET 491 43/74/7277 03:05 AM    MCV 94.1 02/14/2021 03:05 AM       Lab Results   Component Value Date/Time    Color YELLOW/STRAW 02/10/2021 03:33 PM    Appearance CLEAR 02/10/2021 03:33 PM    pH (UA) 5.0 02/10/2021 03:33 PM    Protein TRACE (A) 02/10/2021 03:33 PM    Glucose Negative 02/10/2021 03:33 PM    Ketone Negative 02/10/2021 03:33 PM    Bilirubin Negative 02/10/2021 03:33 PM    Blood Negative 02/10/2021 03:33 PM    Urobilinogen 0.2 02/10/2021 03:33 PM    Nitrites Negative 02/10/2021 03:33 PM    Leukocyte Esterase TRACE (A) 02/10/2021 03:33 PM    WBC 5-10 02/10/2021 03:33 PM    RBC 0-5 02/10/2021 03:33 PM    Bacteria Negative 02/10/2021 03:33 PM       IMPRESSION:  ==========  -LLL pneumonia. -Bilateral Lower lobe L > R bronchial wall thickening with mucous with elevated eosinophils - asthma?  -Eosinophilia. -COPD with 50 pack year smoking hx.  -Former smoker. PLAN:  =====  -PO steroids over the next 7 days  -MBS showed thin barium aspiration, thicker materials had flash penetration without aspiration; SLP recommending dysphagia 2 diet  -on doxy, complete ten days   -IgE 11 and RAST all negative.  We can plan to retest this a month following steroid cessation   -ICS/LABA agent.  -can be discharged soon from pulmonary aspect; follow up chest x-ray in 4-6 wks with Dr Yohannes Olmedo PA-C

## 2021-02-16 NOTE — PROGRESS NOTES
Problem: Dysphagia (Adult)  Goal: *Acute Goals and Plan of Care (Insert Text)  Description: Speech therapy goals  Initiated 2/11/2021   1. Patient will tolerate dysphagia 2/nectar thick liquid diet without s/s of aspiration within 7 days   2. Patient will participate in [facility] study for further objective imaging of the swallow within 7 days MET 2/12/2021   Outcome: Progressing Towards Goal     SPEECH LANGUAGE PATHOLOGY DYSPHAGIA TREATMENT/DISCHARGE  Patient: Delta Silverman (14 y.o. female)  Date: 2/16/2021  Diagnosis: Pneumonia [J18.9] Pneumonia       Precautions:  Fall    ASSESSMENT:  Pt tolerating modified diet without any adverse effects nor overt s/s of aspiration. Given severity of dysphagia and inability to participate in intensive swallow treatment given dementia, will sign-off for now. Would recommend following up OP ENT and can certainly repeat MBS outpatient if indicated. PLAN:  --Dysphagia mechanically altered ground diet/nectar-thick liquids  --1:1 assistance  --general aspiration precautions    Patient will be discharged from acute skilled speech therapy at this time. Rationale for discharge:  Tom Hines reached    Discharge Recommendations:  Outpatient     SUBJECTIVE:   Patient stated, \"Thank you. That's good.     OBJECTIVE:   Cognitive and Communication Status:  Neurologic State: Alert, Confused  Orientation Level: Oriented to person, Disoriented to place, Disoriented to situation, Disoriented to time  Cognition: Decreased attention/concentration, Decreased command following         Perseveration: Perseverates during conversation    Safety/Judgement: Decreased awareness of need for assistance, Decreased awareness of need for safety, Decreased insight into deficits  Dysphagia Treatment:  Oral Assessment:  Oral Assessment  Labial: No impairment  Dentition: Natural  Oral Hygiene: oral mucosa moist and clear of secretions  Lingual: No impairment  Velum: No impairment  Mandible: No impairment  P.O. Trials:  Patient Position: upright in bed  Vocal quality prior to P.O.: Hoarse  Consistency Presented: Puree;Mechanical soft; Nectar thick liquid  How Presented: SLP-fed/presented;Spoon;Cup/sip     Bolus Acceptance: No impairment  Bolus Formation/Control: No impairment     Propulsion: Delayed (# of seconds)     Initiation of Swallow: Delayed (# of seconds)  Laryngeal Elevation: Decreased  Aspiration Signs/Symptoms: None  Pharyngeal Phase Characteristics: No impairment, issues, or problems     NOMS:   The NOMS functional outcome measure was used to quantify this patient's level of swallowing impairment. Based on the NOMS, the patient was determined to be at level 4 for swallow function     NOMS Swallowing Levels:  Level 1 (CN): NPO  Level 2 (CM): NPO but takes consistency in therapy  Level 3 (CL): Takes less than 50% of nutrition p.o. and continues with nonoral feedings; and/or safe with mod cues; and/or max diet restriction  Level 4 (CK): Safe swallow but needs mod cues; and/or mod diet restriction; and/or still requires some nonoral feeding/supplements  Level 5 (CJ): Safe swallow with min diet restriction; and/or needs min cues  Level 6 (CI): Independent with p.o.; rare cues; usually self cues; may need to avoid some foods or needs extra time  Level 7 ([address] [place] [address]): Independent for all p.o.  JORY. (2003). National Outcomes Measurement System (NOMS): Adult Speech-Language Pathology User's Guide. Pain:  Pain Scale 1: Numeric (0 - 10)  Pain Intensity 1: 0       After treatment:   Call bell within reach and Nursing notified    COMMUNICATION/EDUCATION:     The patient's plan of care including recommendations, planned interventions, and recommended diet changes were discussed with: Registered nurse.      DARRYL Walton  Time Calculation: 15 mins

## 2021-02-17 VITALS
HEART RATE: 67 BPM | RESPIRATION RATE: 18 BRPM | WEIGHT: 137.5 LBS | SYSTOLIC BLOOD PRESSURE: 188 MMHG | HEIGHT: 62 IN | BODY MASS INDEX: 25.3 KG/M2 | OXYGEN SATURATION: 95 % | TEMPERATURE: 97.9 F | DIASTOLIC BLOOD PRESSURE: 85 MMHG

## 2021-02-17 PROCEDURE — 74011636637 HC RX REV CODE- 636/637: Performed by: INTERNAL MEDICINE

## 2021-02-17 PROCEDURE — 74011250637 HC RX REV CODE- 250/637: Performed by: INTERNAL MEDICINE

## 2021-02-17 RX ORDER — AMLODIPINE BESYLATE 5 MG/1
5 TABLET ORAL DAILY
Qty: 30 TAB | Refills: 11 | Status: SHIPPED
Start: 2021-02-17 | End: 2021-10-20

## 2021-02-17 RX ORDER — PREDNISONE 10 MG/1
TABLET ORAL
Qty: 5 TAB | Refills: 0 | Status: SHIPPED | OUTPATIENT
Start: 2021-02-17 | End: 2021-02-24 | Stop reason: ALTCHOICE

## 2021-02-17 RX ORDER — ALBUTEROL SULFATE 0.83 MG/ML
SOLUTION RESPIRATORY (INHALATION)
Qty: 75 ML | Refills: 0 | Status: SHIPPED
Start: 2021-02-17 | End: 2021-02-24

## 2021-02-17 RX ORDER — DOXYCYCLINE HYCLATE 100 MG
100 TABLET ORAL EVERY 12 HOURS
Qty: 10 TAB | Refills: 0 | Status: SHIPPED | OUTPATIENT
Start: 2021-02-17 | End: 2021-02-24 | Stop reason: ALTCHOICE

## 2021-02-17 RX ORDER — DOXYCYCLINE HYCLATE 100 MG
100 TABLET ORAL EVERY 12 HOURS
Status: DISCONTINUED | OUTPATIENT
Start: 2021-02-17 | End: 2021-02-17 | Stop reason: HOSPADM

## 2021-02-17 RX ADMIN — PREDNISONE 20 MG: 20 TABLET ORAL at 09:38

## 2021-02-17 RX ADMIN — AMLODIPINE BESYLATE 5 MG: 5 TABLET ORAL at 09:37

## 2021-02-17 RX ADMIN — ESCITALOPRAM OXALATE 10 MG: 10 TABLET ORAL at 09:38

## 2021-02-17 RX ADMIN — DOXYCYCLINE HYCLATE 100 MG: 100 TABLET, COATED ORAL at 09:38

## 2021-02-17 NOTE — DISCHARGE INSTRUCTIONS
Patient Discharge Instructions    Bry Berg / [id number] : 3/16/1931    Admitted 2/10/2021 Discharged: 2021     Take Home Medications     Take Albuterol nebulizer treatments 4 times daily scheduled. Do not take Cholestyramine powder for now       · It is important that you take the medication exactly as they are prescribed. · Keep your medication in the bottles provided by the pharmacist and keep a list of the medication names, dosages, and times to be taken in your wallet. · Do not take other medications without consulting your doctor. What to do at Home    Recommended diet: ground up food. Nectar thick liquids , eat with assistance. Recommended activity: up to wheelchair for meals and during the day. Rest as needed     If you experience any of the following symptoms  Shortness of breath, cough , please follow up with Dr. Sraah Alcantara at [phone]     Follow-up with Dr. Urbano Pagan. Call [phone] for an appointment  or . [address]. If possible, wait to take the second COVID vaccine until 2021. If not possible to wait because it won't be available except on , then take it         Information obtained by :  I understand that if any problems occur once I am at home I am to contact my physician. I understand and acknowledge receipt of the instructions indicated above.                                                                                                                                            Physician's or R.N.'s Signature                                                                  Date/Time                                                                                                                                              Patient or Representative Signature                                                          Date/Time

## 2021-02-17 NOTE — PROGRESS NOTES
Name: Pratibha Handing: Diana David 55   : 3/16/1931 Admit Date: 2/10/2021   Phone: [phone]  Room: [facility]   PCP: Mariia Aguilar MD  MRN: [id number]   Date: 2021  Code: Full Code        HPI:      Getting fed  Looks ok  On room air      On room air  stable    2/15  On room air  Notes feeling ok. No congestion        No breathing issues at this time  On room air       12:57 PM       History was obtained from patient and medical records. I was asked by Salo Hughes MD to see Peter Velasquez in consultation for a chief complaint of hypoxemia. History of Present Illness: 80year old female with past medical history as given below who presented to [facility] with increased cough and noted to have hypoxemia in the ED and subsequently admitted. Patient is a poor historian and denies any cough, shortness of breath or fever to me. Per notes she reportedly had cough for the past 4 days and her O2 levels dropped to 70%. She saw her pcp and subsequently was referred to [facility] for admission. Patient is a former smoker 1 ppd x 50 years. Ct Chest reviewed - LLL infiltrate vs atelectasis. Mucous in trachea and LLL bronchial thickening. Eosinophils 6%. Started on decadron 6 mg iv q 24 and Doxycycline. PCT 0.08  COVID-19 pending.     Past Medical History:   Diagnosis Date    Alcoholism ([facility] [address] 11/10/2011    Arthritis     Dementia     Dementia ([facility] [address] 11/10/2011    Depression 11/10/2011    Diverticulosis 2014    HTN (hypertension) 2012    Hyperlipidemia 2012    Hypertension     Hypovitaminosis D 2013    Intraventricular hemorrhage, nontraumatic ([address] 2012    Mass of pancreas 2014    1.5 CM DENSITY POST HEAD OF PANCREAS ON CT 2014    Osteoporosis 2013    Other ill-defined conditions(799.89)     broken right shoulder    Stroke [facility])        Past Surgical History:   Procedure Laterality Date    HX APPENDECTOMY      HX CATARACT REMOVAL      HX CHOLECYSTECTOMY      HX HYSTERECTOMY      HX ORTHOPAEDIC      repair lt shoulder fx       History reviewed. No pertinent family history.     Social History     Tobacco Use    Smoking status: Former Smoker     Packs/day: 1.00     Years: 50.00     Pack years: 50.00   Substance Use Topics    Alcohol use: Yes     Comment: dtrs state amount unknown       Allergies   Allergen Reactions    Codeine Hives     Pt cannot really remember allergy, uncertain of reactioni    Ibuprofen Other (comments)     Abn LFT'S & FEVER    Pcn [Penicillins] Hives       Current Facility-Administered Medications   Medication Dose Route Frequency    doxycycline (VIBRA-TABS) tablet 100 mg  100 mg Oral Q12H    predniSONE (DELTASONE) tablet 20 mg  20 mg Oral DAILY    carboxymethylcellulose sodium (REFRESH PLUS) 0.5 % ophthalmic solution 1 Drop  1 Drop Both Eyes PRN    albuterol-ipratropium (DUO-NEB) 2.5 MG-0.5 MG/3 ML  3 mL Nebulization Q6H PRN    amLODIPine (NORVASC) tablet 5 mg  5 mg Oral DAILY    enoxaparin (LOVENOX) injection 40 mg  40 mg SubCUTAneous Q24H    budesonide (PULMICORT) 500 mcg/2 ml nebulizer suspension  500 mcg Nebulization BID RT    sodium chloride (NS) flush 5-40 mL  5-40 mL IntraVENous Q8H    sodium chloride (NS) flush 5-40 mL  5-40 mL IntraVENous PRN    acetaminophen (TYLENOL) tablet 650 mg  650 mg Oral Q6H PRN    Or    acetaminophen (TYLENOL) suppository 650 mg  650 mg Rectal Q6H PRN    polyethylene glycol (MIRALAX) packet 17 g  17 g Oral DAILY PRN    promethazine (PHENERGAN) tablet 12.5 mg  12.5 mg Oral Q6H PRN    Or    ondansetron (ZOFRAN) injection 4 mg  4 mg IntraVENous Q6H PRN    escitalopram oxalate (LEXAPRO) tablet 10 mg  10 mg Oral DAILY    albuterol (PROVENTIL VENTOLIN) nebulizer solution 2.5 mg  2.5 mg Nebulization Q4H PRN    guaiFENesin-dextromethorphan (ROBITUSSIN DM) 100-10 mg/5 mL syrup 5 mL  5 mL Oral Q4H PRN         REVIEW OF SYSTEMS   Negative except as stated in the HPI. Physical Exam:   Visit Vitals  BP (!) 192/72 (BP 1 Location: Left arm, BP Patient Position: At rest)   Pulse 71   Temp 98.1 °F (36.7 °C)   Resp 16   Ht 5' 2\" (1.575 m)   Wt 62.4 kg (137 lb 8 oz)   SpO2 97%   BMI 25.15 kg/m²       General:  Alert, cooperative. Head:  Normocephalic. Eyes:  Conjunctivae/corneas clear. Nose: Nares normal. Septum midline. Throat: Lips, mucosa, and tongue normal. Teeth and gums normal.   Neck: Supple, symmetrical, trachea midline. Lungs:   Normal expansion . Heart:  No visible thrills    Abdomen:   Soft, non-tender. Bowel sounds normal. No masses,  No organomegaly. Extremities: No edema.        Skin: Skin color, texture, turgor normal. No rashes or lesions       Neurologic: Grossly nonfocal       Lab Results   Component Value Date/Time    Sodium 142 02/14/2021 03:05 AM    Potassium 4.2 02/14/2021 03:05 AM    Chloride 113 (H) 02/14/2021 03:05 AM    CO2 23 02/14/2021 03:05 AM    BUN 35 (H) 02/14/2021 03:05 AM    Creatinine 1.09 (H) 02/14/2021 03:05 AM    Glucose 133 (H) 02/14/2021 03:05 AM    Calcium 9.1 02/14/2021 03:05 AM    Magnesium 1.7 01/17/2014 07:04 PM    Phosphorus 3.3 06/19/2012 04:00 AM    Lactic acid 1.2 04/30/2020 12:16 AM       Lab Results   Component Value Date/Time    WBC 12.7 (H) 02/14/2021 03:05 AM    HGB 12.4 02/14/2021 03:05 AM    PLATELET 215 73/04/6983 03:05 AM    MCV 94.1 02/14/2021 03:05 AM       Lab Results   Component Value Date/Time    Color YELLOW/STRAW 02/10/2021 03:33 PM    Appearance CLEAR 02/10/2021 03:33 PM    pH (UA) 5.0 02/10/2021 03:33 PM    Protein TRACE (A) 02/10/2021 03:33 PM    Glucose Negative 02/10/2021 03:33 PM    Ketone Negative 02/10/2021 03:33 PM    Bilirubin Negative 02/10/2021 03:33 PM    Blood Negative 02/10/2021 03:33 PM    Urobilinogen 0.2 02/10/2021 03:33 PM    Nitrites Negative 02/10/2021 03:33 PM    Leukocyte Esterase TRACE (A) 02/10/2021 03:33 PM    WBC 5-10 02/10/2021 03:33 PM    RBC 0-5 02/10/2021 03:33 PM    Bacteria Negative 02/10/2021 03:33 PM       IMPRESSION:  ==========  -LLL pneumonia. -Bilateral Lower lobe L > R bronchial wall thickening with mucous with elevated eosinophils - asthma?  -Eosinophilia. -COPD with 50 pack year smoking hx.  -Former smoker. PLAN:  =====  -PO steroids over the next 7 days  -MBS showed thin barium aspiration, thicker materials had flash penetration without aspiration; SLP recommending dysphagia 2 diet  -on doxy, complete ten days   -IgE 11 and RAST all negative.  We can plan to retest this a month following steroid cessation   -ICS/LABA agent.  -can be discharged soon from pulmonary aspect; follow up chest x-ray in 4-6 wks with Dr Judy Burgess PA-C

## 2021-02-17 NOTE — PROGRESS NOTES
STEPHAN-Home with Home Health, referrals pending  RUR-13% Low      CM followed up with patient's  and caregiver Stan over the phone to discuss transitional care plan. Per Stan, patient has 3 24/7 caregivers at home. Stan and patient's  will be providing transportation home today after 11:00 am. CM informed of the recommendation to set up home health PT- in agreement with plan. CM will request orders from attending. CM attempted phone call to patient's daughter Cornelio Costello [phone] but she was not available-CM left voice message. Transition of Care Plan:     The Plan for Transition of Care is related to the following treatment goals: Home Health    The patient's  was provided with a choice of provider and agrees  with the discharge plan. Yes [x] No []    A Freedom of choice list was provided with basic dialogue that supports the patient's individualized plan of care/goals and shares the quality data associated with the providers. Yes [x] No []     Rogena Meigs, MS     11:30 CM received return phone call from daughter Ruben Guillen. CM advised HH will need to be arranged. Ruben Guillen provided additional contact for gerardo Castro [phone] to be added to emergency contact list. CM to monitor. Rogena Meigs, MS    2:29 pm Patient has been accepted to [facility] for home health with a [facility] of tomorrow 2/18/21, weather permitting. [address] accepted for home health with a [facility] of Saturday 2/20/21. AVS updated and patient and family contacted.      Rogena Meigs, MS

## 2021-02-18 ENCOUNTER — PATIENT OUTREACH (OUTPATIENT)
Dept: CASE MANAGEMENT | Age: 86
End: 2021-02-18

## 2021-02-18 PROBLEM — J44.9 COPD (CHRONIC OBSTRUCTIVE PULMONARY DISEASE) (HCC): Status: ACTIVE | Noted: 2021-02-18

## 2021-02-18 NOTE — PROGRESS NOTES
Physician Progress Note      Niko Rob  CSN #:                  [id number]  :                       3/16/1931  ADMIT DATE:       2/10/2021 2:13 PM  Britany Duenas DATE:        2021 12:00 PM  RESPONDING  PROVIDER #:        Mari Potter MD          QUERY TEXT:    Pt admitted with SOB/cough  Pt noted to have pneumonia  If possible, please document in the progress notes and discharge summary if you are evaluating and/or treating any of the following: The medical record reflects the following:  Risk Factors: cough , SOB, COPD  Clinical Indicators:  -pulmo  [facility] with increased cough and noted to have hypoxemia in the ED and subsequently admitted. Patient is a poor historian and denies any cough, shortness of breath or fever to me. Per notes she reportedly had cough for the past 4 days and her O2 levels dropped to 70%. She saw her pcp and subsequently was referred to [facility] for admission. Ct Chest reviewed - LLL infiltrate vs atelectasis. Mucous in trachea and LLL bronchial thickening. Eosinophils 6%. -LLL pneumonia. -Bilateral Lower lobe L > R bronchial wall thickening with mucous with elevated eosinophils - asthma?  -Eosinophilia. Treatment: vibramycin, decadron    Thank you,  Heath Foley RN, CDI, CCDS  Certified Clinical   [phone]  Options provided:  -- Gram negative pneumonia  -- Bacterial pneumonia  -- Viral pneumonia  -- Aspiration pneumonia  -- Other - I will add my own diagnosis  -- Disagree - Not applicable / Not valid  -- Disagree - Clinically unable to determine / Unknown  -- Refer to Clinical Documentation Reviewer    PROVIDER RESPONSE TEXT:    This patient has bacterial pneumonia.     Query created by: Bobo Palomino on 2021 10:20 AM      Electronically signed by:  Mari Potter MD 2021 11:46 AM

## 2021-02-18 NOTE — PROGRESS NOTES
Patient Name: Ana Cristina Luke    : 3/16/1931         MRN: [id number]    Admit Diagnosis: Pneumonia [J18.9]          Discharge Disposition: home     Discharge Condition: Fair     Activity at Discharge: Activity as tolerated      Discharge Paperwork Signed & Completed      [x] yes      [] no    Prescriptions Given to Patient      [x] yes      [] no    Supplies Given to Patient      [x] yes      [] no    Dressing Change/ Wound Care Teaching      _ yes      _ no    Care Plans Completed      [x] yes      [] no    IV Lines Removed      [x] yes      [] no                 Discharging RN, Pearl Hernandez, has explained the discharge paperwork to the patient and/or family, completed patient and/or family teaching with patient and/or family teach back, and has answered any and all patient and/or family questions before discharge.

## 2021-02-18 NOTE — PROGRESS NOTES
Patient was admitted to [facility] on 2/10/21 and discharged on 21 for LLL PNA.     Outreach made within 2 business days of discharge: Yes    Top Discharge Challenges to be reviewed by the provider   Additional needs identified to be addressed with provider:  No  PCP was the attending during hospital stay.      Discussed COVID-19 related testing which was available at this time. Test results were negative. Patient informed of results, if available? NA   Method of communication with provider : none       Advance Care Planning:   Does patient have an Advance Directive:  AMD not on file. Left VM for daughter to return call.     Inpatient Readmission Risk score: 13%  Was this a readmission? no   Patient stated reason for the admission: NA  Patients top risk factors for readmission: functional cognitive ability, functional physical ability and medical condition  Interventions to address risk factors: education and support.     Care Transition Nurse (CTN) contacted the patient by telephone to perform post hospital discharge assessment. Verified name and  with caregiver as identifiers. Provided introduction to self, and explanation of the CTN role.     CTN reviewed discharge instructions, medical action plan and red flags with caregiver who verbalized understanding. Caregiver given an opportunity to ask questions and does not have any further questions or concerns at this time. The caregiver agrees to contact the PCP office for questions related to their healthcare.     Medication reconciliation was performed with caregiver, who verbalizes understanding of administration of home medications. Advised obtaining a 90-day supply of all daily and as-needed medications.   Referral to Pharm D needed: no     Home Health/Outpatient orders at discharge: PT, OT, SN and SLP  Home Health company: [facility]  Date of initial visit: CTN spoke with  office- inclement weather today- office will reach out to schedule-  confirmed they have ST on staff to see. Durable Medical Equipment ordered at discharge: None    Covid Risk Education    Patient has following risk factors of: COPD, pneumonia and HTN, possible new DX of Asthma. Education provided regarding infection prevention, and signs and symptoms of COVID-19 and when to seek medical attention with caregiver who verbalized understanding. Discussed exposure protocols and quarantine From CDC: Are you at higher risk for severe illness?  and given an opportunity for questions and concerns. The caregiver agrees to contact the COVID-19 hotline [phone] or PCP office for questions related to COVID-19. For more information on steps you can take to protect yourself, see CDC's How to Protect Yourself     Discussed follow-up appointments. If no appointment was previously scheduled, appointment scheduling offered: not needed- daughter and/or caregiver will call to schedule  [facility] follow up appointment(s): No future appointments. Non-[facility] follow up appointment(s):   PCP- Azra Mathews- Dr. Adonna Bamberger  4-6 weeks, f/u CXR and additional testing for Asthma  ENT- Dr. Dayan Benavides- 2-4 weeks after ST -  3/18/21. Plan for follow-up call in 5-7 days based on severity of symptoms and risk factors. CTN provided contact information for future needs. Goals Addressed                 This Visit's Progress       General     Reduce Risk of Hospitalization        2/18/21- spoke with Behzad Childs, caregiver. Behzad Childs has good understanding of diagnosis- evaluation and treatment during hospital stay. She now understands to give the Nebulizer on schedule- she relays that they have to use a mask and she is not able to do HHI.  (due to Dementia). Behzad Childs will talk with daughters about scheduling follow up with Pulmonary - provided name and phone number- explained follow up CXR in 4-6 weeks and additional testing for potential new DX of Asthma.     Her caregivers had already been working with her on challenges to swallowing- explained mechanical soft, ground solids and nectar thick liquids. Explained that ST ordered through [facility] to see if she can help with muscle-tongue movements- has follow up in place with ENT on 3/18. Lisinopril had been stopped by PCP prior to admission. Donovan David can check her BP at home. Advised if SBP consistently above 140- reach out to PCP for guidance. She will call office to schedule hospital follow up appointment. Daughters take and attend appointments. Left message for daughter, Thereasa Bruins to return call. Donovan Hernandez states that  has signs of early dementia.       LLC

## 2021-02-18 NOTE — DISCHARGE SUMMARY
[address] SUMMARY    Name:  Sydney Drake  MR#:  [id number]  :  1931  ACCOUNT #:  [de-identified]  ADMIT DATE:  02/10/2021  DISCHARGE DATE:  2021      DISCHARGE DIAGNOSES:  Left lower lobe pneumonia, eosinophilia, chronic obstructive pulmonary disease, and hypertension. DISCHARGE MEDICATIONS:  Albuterol jet nebulizer q.i.d., amlodipine 5 mg daily, doxycycline 100 mg p.o. b.i.d., prednisone 20 mg p.o. daily taper, albuterol inhaler 2 puffs q.4 h. p.r.n., Lexapro 10 mg p.o. daily, nystatin powder q.i.d. Nectar-thick liquids, pureed diet, feed with assistance. Call Dr. Johan Pham for shortness of breath or cough, otherwise follow up with him in 5-6 days. For details of the history and physical, see the separately dictated H and [address]:  The patient was admitted by the hospitalist group on 02/10/2021 to full inpatient admission. Chest CT revealed left lower lobe infiltrate versus atelectasis, mucus in trachea, and left lower lobe bronchial thickening. Eosinophils were 6%. The patient was initially placed on Decadron IV until COVID-19 PCR returned negative and then she was changed to methylprednisolone. She was given DuoNeb and doxycycline IV, seen in consultation by [facility], Dr. Get Palafox, was given budesonide jet neb, was transitioned successfully to p.o. prednisone. Patient  was seen by ENT, Oft Ardon MD, who agreed with speech therapy evaluation. Modified Barium Swallow: Thin barium - aspiration, thickened material - flash penetration without aspiration. The patient tolerated nectar-thick liquids with pureed diet. She made clinical improvement. She was transitioned to p.o. doxycycline. She was discharged home with home health. Her family was apprised of her progress. The patient was instructed to get her second COVID-19 vaccine as an outpatient.     The patient was to have followup with Dr. Get Palafox in 4-6 weeks.        Allyssa Florez MD      WH/SEA_PAUL_IN/BC_MON  D:  02/17/2021 19:28  T:  02/18/2021 2:55  JOB #:  [id number]  CC:  MD Pascual Carolina MD

## 2021-02-18 NOTE — PROGRESS NOTES
Problem: Falls - Risk of  Goal: *Absence of Falls  Description: Document Katie Serrato Fall Risk and appropriate interventions in the flowsheet. Outcome: Resolved/Met     Problem: Patient Education: Go to Patient Education Activity  Goal: Patient/Family Education  Outcome: Resolved/Met     Problem: Pressure Injury - Risk of  Goal: *Prevention of pressure injury  Description: Document Cleve Scale and appropriate interventions in the flowsheet.   Outcome: Resolved/Met     Problem: Patient Education: Go to Patient Education Activity  Goal: Patient/Family Education  Outcome: Resolved/Met     Problem: Patient Education: Go to Patient Education Activity  Goal: Patient/Family Education  Outcome: Resolved/Met     Problem: Patient Education: Go to Patient Education Activity  Goal: Patient/Family Education  Outcome: Resolved/Met

## 2021-03-25 ENCOUNTER — TRANSCRIBE ORDER (OUTPATIENT)
Dept: SCHEDULING | Age: 86
End: 2021-03-25

## 2021-03-25 DIAGNOSIS — J18.9 PNEUMONIA: Primary | ICD-10-CM

## 2021-05-12 ENCOUNTER — HOSPITAL ENCOUNTER (OUTPATIENT)
Dept: CT IMAGING | Age: 86
Discharge: HOME | End: 2021-05-12
Attending: INTERNAL MEDICINE
Payer: MEDICARE

## 2021-05-12 DIAGNOSIS — J18.9 PNEUMONIA: ICD-10-CM

## 2021-05-12 PROCEDURE — 71250 CT THORAX DX C-: CPT

## 2021-10-14 ENCOUNTER — HOSPITAL ENCOUNTER (INPATIENT)
Age: 86
LOS: 6 days | Discharge: HOSPICE | DRG: 177 | End: 2021-10-20
Attending: EMERGENCY MEDICINE | Admitting: FAMILY MEDICINE
Payer: MEDICARE

## 2021-10-14 ENCOUNTER — APPOINTMENT (OUTPATIENT)
Dept: ULTRASOUND IMAGING | Age: 86
DRG: 177 | End: 2021-10-14
Attending: FAMILY MEDICINE
Payer: MEDICARE

## 2021-10-14 ENCOUNTER — APPOINTMENT (OUTPATIENT)
Dept: GENERAL RADIOLOGY | Age: 86
DRG: 177 | End: 2021-10-14
Attending: EMERGENCY MEDICINE
Payer: MEDICARE

## 2021-10-14 ENCOUNTER — APPOINTMENT (OUTPATIENT)
Dept: CT IMAGING | Age: 86
DRG: 177 | End: 2021-10-14
Attending: EMERGENCY MEDICINE
Payer: MEDICARE

## 2021-10-14 DIAGNOSIS — I10 BENIGN ESSENTIAL HTN: ICD-10-CM

## 2021-10-14 DIAGNOSIS — J96.01 ACUTE RESPIRATORY FAILURE WITH HYPOXIA (HCC): ICD-10-CM

## 2021-10-14 DIAGNOSIS — I48.91 ATRIAL FIBRILLATION, UNSPECIFIED TYPE (HCC): Primary | ICD-10-CM

## 2021-10-14 DIAGNOSIS — J18.9 COMMUNITY ACQUIRED PNEUMONIA, UNSPECIFIED LATERALITY: ICD-10-CM

## 2021-10-14 DIAGNOSIS — I48.91 NEW ONSET A-FIB (HCC): ICD-10-CM

## 2021-10-14 DIAGNOSIS — I48.19 PERSISTENT ATRIAL FIBRILLATION (HCC): ICD-10-CM

## 2021-10-14 LAB
ALBUMIN SERPL-MCNC: 3 G/DL (ref 3.5–5)
ALBUMIN/GLOB SERPL: 0.6 {RATIO} (ref 1.1–2.2)
ALP SERPL-CCNC: 122 U/L (ref 45–117)
ALT SERPL-CCNC: 29 U/L (ref 12–78)
ANION GAP SERPL CALC-SCNC: 3 MMOL/L (ref 5–15)
ARTERIAL PATENCY WRIST A: POSITIVE
AST SERPL-CCNC: 11 U/L (ref 15–37)
BASE EXCESS BLD CALC-SCNC: 0.2 MMOL/L
BASOPHILS # BLD: 0.1 K/UL (ref 0–0.1)
BASOPHILS NFR BLD: 1 % (ref 0–1)
BDY SITE: ABNORMAL
BILIRUB SERPL-MCNC: 0.5 MG/DL (ref 0.2–1)
BNP SERPL-MCNC: 7504 PG/ML
BUN SERPL-MCNC: 19 MG/DL (ref 6–20)
BUN/CREAT SERPL: 18 (ref 12–20)
CALCIUM SERPL-MCNC: 9.1 MG/DL (ref 8.5–10.1)
CALCULATED R AXIS, ECG10: -7 DEGREES
CALCULATED T AXIS, ECG11: -69 DEGREES
CHLORIDE SERPL-SCNC: 108 MMOL/L (ref 97–108)
CO2 SERPL-SCNC: 25 MMOL/L (ref 21–32)
COMMENT, HOLDF: NORMAL
COVID-19 RAPID TEST, COVR: NOT DETECTED
CREAT SERPL-MCNC: 1.04 MG/DL (ref 0.55–1.02)
D DIMER PPP FEU-MCNC: 3.66 MG/L FEU (ref 0–0.65)
DIAGNOSIS, 93000: NORMAL
DIFFERENTIAL METHOD BLD: ABNORMAL
EOSINOPHIL # BLD: 0.1 K/UL (ref 0–0.4)
EOSINOPHIL NFR BLD: 1 % (ref 0–7)
ERYTHROCYTE [DISTWIDTH] IN BLOOD BY AUTOMATED COUNT: 16.5 % (ref 11.5–14.5)
ETHANOL SERPL-MCNC: <10 MG/DL
GAS FLOW.O2 O2 DELIVERY SYS: ABNORMAL L/MIN
GLOBULIN SER CALC-MCNC: 4.8 G/DL (ref 2–4)
GLUCOSE SERPL-MCNC: 128 MG/DL (ref 65–100)
HCO3 BLD-SCNC: 23.4 MMOL/L (ref 22–26)
HCT VFR BLD AUTO: 34.9 % (ref 35–47)
HGB BLD-MCNC: 10.7 G/DL (ref 11.5–16)
IMM GRANULOCYTES # BLD AUTO: 0.1 K/UL (ref 0–0.04)
IMM GRANULOCYTES NFR BLD AUTO: 1 % (ref 0–0.5)
LACTATE SERPL-SCNC: 1.2 MMOL/L (ref 0.4–2)
LIPASE SERPL-CCNC: 104 U/L (ref 73–393)
LYMPHOCYTES # BLD: 1.6 K/UL (ref 0.8–3.5)
LYMPHOCYTES NFR BLD: 13 % (ref 12–49)
MAGNESIUM SERPL-MCNC: 2.4 MG/DL (ref 1.6–2.4)
MCH RBC QN AUTO: 27.7 PG (ref 26–34)
MCHC RBC AUTO-ENTMCNC: 30.7 G/DL (ref 30–36.5)
MCV RBC AUTO: 90.4 FL (ref 80–99)
MONOCYTES # BLD: 0.9 K/UL (ref 0–1)
MONOCYTES NFR BLD: 8 % (ref 5–13)
NEUTS SEG # BLD: 9.2 K/UL (ref 1.8–8)
NEUTS SEG NFR BLD: 76 % (ref 32–75)
NRBC # BLD: 0 K/UL (ref 0–0.01)
NRBC BLD-RTO: 0 PER 100 WBC
O2/TOTAL GAS SETTING VFR VENT: 3 %
PCO2 BLD: 32.7 MMHG (ref 35–45)
PH BLD: 7.46 [PH] (ref 7.35–7.45)
PLATELET # BLD AUTO: 231 K/UL (ref 150–400)
PMV BLD AUTO: 12.2 FL (ref 8.9–12.9)
PO2 BLD: 74 MMHG (ref 80–100)
POTASSIUM SERPL-SCNC: 3.9 MMOL/L (ref 3.5–5.1)
PROT SERPL-MCNC: 7.8 G/DL (ref 6.4–8.2)
Q-T INTERVAL, ECG07: 308 MS
QRS DURATION, ECG06: 110 MS
QTC CALCULATION (BEZET), ECG08: 499 MS
RBC # BLD AUTO: 3.86 M/UL (ref 3.8–5.2)
SAMPLES BEING HELD,HOLD: NORMAL
SAO2 % BLD: 95.7 % (ref 92–97)
SODIUM SERPL-SCNC: 136 MMOL/L (ref 136–145)
SOURCE, COVRS: NORMAL
SPECIMEN TYPE: ABNORMAL
TROPONIN-HIGH SENSITIVITY: 23 NG/L (ref 0–51)
TSH SERPL DL<=0.05 MIU/L-ACNC: 15.1 UIU/ML (ref 0.36–3.74)
VENTRICULAR RATE, ECG03: 158 BPM
WBC # BLD AUTO: 11.9 K/UL (ref 3.6–11)

## 2021-10-14 PROCEDURE — 74011250637 HC RX REV CODE- 250/637: Performed by: FAMILY MEDICINE

## 2021-10-14 PROCEDURE — 74011250636 HC RX REV CODE- 250/636: Performed by: FAMILY MEDICINE

## 2021-10-14 PROCEDURE — 96376 TX/PRO/DX INJ SAME DRUG ADON: CPT

## 2021-10-14 PROCEDURE — 84443 ASSAY THYROID STIM HORMONE: CPT

## 2021-10-14 PROCEDURE — 74011250636 HC RX REV CODE- 250/636: Performed by: EMERGENCY MEDICINE

## 2021-10-14 PROCEDURE — 99222 1ST HOSP IP/OBS MODERATE 55: CPT | Performed by: INTERNAL MEDICINE

## 2021-10-14 PROCEDURE — 82803 BLOOD GASES ANY COMBINATION: CPT

## 2021-10-14 PROCEDURE — 74011000250 HC RX REV CODE- 250: Performed by: EMERGENCY MEDICINE

## 2021-10-14 PROCEDURE — 76700 US EXAM ABDOM COMPLETE: CPT

## 2021-10-14 PROCEDURE — 71275 CT ANGIOGRAPHY CHEST: CPT

## 2021-10-14 PROCEDURE — 74011000250 HC RX REV CODE- 250: Performed by: FAMILY MEDICINE

## 2021-10-14 PROCEDURE — 74011000258 HC RX REV CODE- 258: Performed by: EMERGENCY MEDICINE

## 2021-10-14 PROCEDURE — 87635 SARS-COV-2 COVID-19 AMP PRB: CPT

## 2021-10-14 PROCEDURE — 71045 X-RAY EXAM CHEST 1 VIEW: CPT

## 2021-10-14 PROCEDURE — 96374 THER/PROPH/DIAG INJ IV PUSH: CPT

## 2021-10-14 PROCEDURE — 85379 FIBRIN DEGRADATION QUANT: CPT

## 2021-10-14 PROCEDURE — 83880 ASSAY OF NATRIURETIC PEPTIDE: CPT

## 2021-10-14 PROCEDURE — 80053 COMPREHEN METABOLIC PANEL: CPT

## 2021-10-14 PROCEDURE — 65660000000 HC RM CCU STEPDOWN

## 2021-10-14 PROCEDURE — 99285 EMERGENCY DEPT VISIT HI MDM: CPT

## 2021-10-14 PROCEDURE — 83690 ASSAY OF LIPASE: CPT

## 2021-10-14 PROCEDURE — 83605 ASSAY OF LACTIC ACID: CPT

## 2021-10-14 PROCEDURE — 84145 PROCALCITONIN (PCT): CPT

## 2021-10-14 PROCEDURE — 84484 ASSAY OF TROPONIN QUANT: CPT

## 2021-10-14 PROCEDURE — 36600 WITHDRAWAL OF ARTERIAL BLOOD: CPT

## 2021-10-14 PROCEDURE — 74011000636 HC RX REV CODE- 636: Performed by: EMERGENCY MEDICINE

## 2021-10-14 PROCEDURE — 74011000250 HC RX REV CODE- 250: Performed by: PHYSICIAN ASSISTANT

## 2021-10-14 PROCEDURE — 74011250636 HC RX REV CODE- 250/636: Performed by: PHYSICIAN ASSISTANT

## 2021-10-14 PROCEDURE — 87040 BLOOD CULTURE FOR BACTERIA: CPT

## 2021-10-14 PROCEDURE — 93005 ELECTROCARDIOGRAM TRACING: CPT

## 2021-10-14 PROCEDURE — 74177 CT ABD & PELVIS W/CONTRAST: CPT

## 2021-10-14 PROCEDURE — 36415 COLL VENOUS BLD VENIPUNCTURE: CPT

## 2021-10-14 PROCEDURE — 82077 ASSAY SPEC XCP UR&BREATH IA: CPT

## 2021-10-14 PROCEDURE — 83735 ASSAY OF MAGNESIUM: CPT

## 2021-10-14 PROCEDURE — 85025 COMPLETE CBC W/AUTO DIFF WBC: CPT

## 2021-10-14 RX ORDER — AMLODIPINE BESYLATE 5 MG/1
5 TABLET ORAL DAILY
Status: DISCONTINUED | OUTPATIENT
Start: 2021-10-15 | End: 2021-10-17

## 2021-10-14 RX ORDER — METOPROLOL TARTRATE 5 MG/5ML
5 INJECTION INTRAVENOUS EVERY 6 HOURS
Status: DISCONTINUED | OUTPATIENT
Start: 2021-10-14 | End: 2021-10-17

## 2021-10-14 RX ORDER — BUDESONIDE 0.5 MG/2ML
500 INHALANT ORAL 2 TIMES DAILY
COMMUNITY
Start: 2021-09-15 | End: 2021-10-20

## 2021-10-14 RX ORDER — SODIUM CHLORIDE 0.9 % (FLUSH) 0.9 %
5-40 SYRINGE (ML) INJECTION EVERY 8 HOURS
Status: DISCONTINUED | OUTPATIENT
Start: 2021-10-14 | End: 2021-10-20 | Stop reason: HOSPADM

## 2021-10-14 RX ORDER — ESCITALOPRAM OXALATE 10 MG/1
10 TABLET ORAL EVERY EVENING
Status: DISCONTINUED | OUTPATIENT
Start: 2021-10-14 | End: 2021-10-19

## 2021-10-14 RX ORDER — SODIUM CHLORIDE 9 MG/ML
125 INJECTION, SOLUTION INTRAVENOUS CONTINUOUS
Status: DISCONTINUED | OUTPATIENT
Start: 2021-10-14 | End: 2021-10-17

## 2021-10-14 RX ORDER — DILTIAZEM HYDROCHLORIDE 5 MG/ML
10 INJECTION INTRAVENOUS
Status: COMPLETED | OUTPATIENT
Start: 2021-10-14 | End: 2021-10-14

## 2021-10-14 RX ORDER — LEVOFLOXACIN 5 MG/ML
750 INJECTION, SOLUTION INTRAVENOUS
Status: COMPLETED | OUTPATIENT
Start: 2021-10-14 | End: 2021-10-14

## 2021-10-14 RX ORDER — IPRATROPIUM BROMIDE AND ALBUTEROL SULFATE 2.5; .5 MG/3ML; MG/3ML
3 SOLUTION RESPIRATORY (INHALATION)
Status: DISCONTINUED | OUTPATIENT
Start: 2021-10-14 | End: 2021-10-16

## 2021-10-14 RX ORDER — DIGOXIN 0.25 MG/ML
250 INJECTION INTRAMUSCULAR; INTRAVENOUS
Status: COMPLETED | OUTPATIENT
Start: 2021-10-14 | End: 2021-10-14

## 2021-10-14 RX ORDER — ACETAMINOPHEN 325 MG/1
650 TABLET ORAL
Status: DISCONTINUED | OUTPATIENT
Start: 2021-10-14 | End: 2021-10-20 | Stop reason: HOSPADM

## 2021-10-14 RX ORDER — SODIUM CHLORIDE 0.9 % (FLUSH) 0.9 %
5-40 SYRINGE (ML) INJECTION AS NEEDED
Status: DISCONTINUED | OUTPATIENT
Start: 2021-10-14 | End: 2021-10-20 | Stop reason: HOSPADM

## 2021-10-14 RX ORDER — IPRATROPIUM BROMIDE AND ALBUTEROL SULFATE 2.5; .5 MG/3ML; MG/3ML
3 SOLUTION RESPIRATORY (INHALATION)
COMMUNITY
Start: 2021-09-24

## 2021-10-14 RX ADMIN — IPRATROPIUM BROMIDE AND ALBUTEROL SULFATE 3 ML: .5; 3 SOLUTION RESPIRATORY (INHALATION) at 22:08

## 2021-10-14 RX ADMIN — IOPAMIDOL 100 ML: 755 INJECTION, SOLUTION INTRAVENOUS at 15:59

## 2021-10-14 RX ADMIN — Medication 10 ML: at 23:32

## 2021-10-14 RX ADMIN — DILTIAZEM HYDROCHLORIDE 10 MG/HR: 5 INJECTION INTRAVENOUS at 11:41

## 2021-10-14 RX ADMIN — SODIUM CHLORIDE 75 ML/HR: 9 INJECTION, SOLUTION INTRAVENOUS at 20:32

## 2021-10-14 RX ADMIN — METOPROLOL TARTRATE 5 MG: 5 INJECTION INTRAVENOUS at 17:58

## 2021-10-14 RX ADMIN — DIGOXIN 250 MCG: 250 INJECTION, SOLUTION INTRAMUSCULAR; INTRAVENOUS; PARENTERAL at 14:54

## 2021-10-14 RX ADMIN — DILTIAZEM HYDROCHLORIDE 12.5 MG/HR: 5 INJECTION, SOLUTION INTRAVENOUS at 12:16

## 2021-10-14 RX ADMIN — DILTIAZEM HYDROCHLORIDE 10 MG: 5 INJECTION INTRAVENOUS at 11:40

## 2021-10-14 RX ADMIN — WATER 1 G: 1 INJECTION INTRAMUSCULAR; INTRAVENOUS; SUBCUTANEOUS at 19:34

## 2021-10-14 RX ADMIN — DILTIAZEM HYDROCHLORIDE 15 MG/HR: 5 INJECTION, SOLUTION INTRAVENOUS at 19:33

## 2021-10-14 RX ADMIN — DILTIAZEM HYDROCHLORIDE 10 MG: 5 INJECTION INTRAVENOUS at 12:31

## 2021-10-14 RX ADMIN — LEVOFLOXACIN 750 MG: 5 INJECTION, SOLUTION INTRAVENOUS at 14:52
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
## 2021-10-14 NOTE — ED NOTES
TRANSFER - OUT REPORT:    Verbal report given to Lo RN(name) on Prince Garrison  being transferred to (unit) for routine progression of care       Report consisted of patients Situation, Background, Assessment and   Recommendations(SBAR). Information from the following report(s) SBAR was reviewed with the receiving nurse. Lines:   Peripheral IV 10/14/21 Right Forearm (Active)       Peripheral IV 10/14/21 Right Arm (Active)   Site Assessment Clean, dry, & intact 10/14/21 1342   Phlebitis Assessment 0 10/14/21 1342   Infiltration Assessment 0 10/14/21 1342   Dressing Status Clean, dry, & intact 10/14/21 1342   Hub Color/Line Status Pink 10/14/21 1342   Action Taken Blood drawn 10/14/21 1342        Opportunity for questions and clarification was provided.       Patient transported with:   Monitor  O2 @ 2 liters  Registered Nurse

## 2021-10-14 NOTE — PROGRESS NOTES
1810: TRANSFER - IN REPORT:    Verbal report received from Svetlana Hernandez RN (name) on Ana Cristina Luke  being received from ED (unit) for routine progression of care      Report consisted of patients Situation, Background, Assessment and   Recommendations(SBAR). Information from the following report(s) SBAR, Kardex, Intake/Output, MAR, Recent Results and Cardiac Rhythm A Fib was reviewed with the receiving nurse. Opportunity for questions and clarification was provided. Assessment completed upon patients arrival to unit and care assumed. 1930: Bedside and Verbal shift change report given to Juany Martínez RN (oncoming nurse) by Demetrius Leroy RN and SHARONDA Singh (offgoing nurse). Report included the following information SBAR, Kardex, Procedure Summary, Intake/Output, MAR and Recent Results.

## 2021-10-14 NOTE — ED NOTES
Pt arrives via EMS after home health called because Sp02 has been reading low. Cardiac monitor reading afib with RVR. Poor history received from home health; pt A&Ox1. Pt lives at home with , who also requires 24 hour care.

## 2021-10-14 NOTE — ED PROVIDER NOTES
HPI the patient was brought and by EMS after they were called for low oxygen saturations, in the 80s since yesterday. On their arrival they found the patient to be in atrial fibrillation. She does not have a history of previous atrial fibrillation. The patient is oriented x1 which apparently is her baseline. When asked if she has chest pain she points to the left upper quadrant of her abdomen. There is no history of fever or cough. Past Medical History:   Diagnosis Date    Alcoholism ([facility] 11/10/2011    Arthritis     COPD (chronic obstructive pulmonary disease) ([facility] 2/18/2021    Dementia     [facility] 11/10/2011    Depression 11/10/2011    Diverticulosis 1/27/2014    HTN (hypertension) 6/18/2012    Hyperlipidemia 6/24/2012    Hypertension     Hypovitaminosis D 12/17/2013    Intraventricular hemorrhage, nontraumatic ([facility] 6/18/2012    Mass of pancreas 1/27/2014    1.5 CM DENSITY POST HEAD OF PANCREAS ON CT 1/27/2014    Osteoporosis 12/16/2013    Other ill-defined conditions(799.89)     broken right shoulder    Stroke [facility])        Past Surgical History:   Procedure Laterality Date    HX APPENDECTOMY      HX CATARACT REMOVAL      HX CHOLECYSTECTOMY      HX HYSTERECTOMY      HX ORTHOPAEDIC      repair lt shoulder fx         No family history on file.     Social History     Socioeconomic History    Marital status:      Spouse name: Not on file    Number of children: Not on file    Years of education: Not on file    Highest education level: Not on file   Occupational History    Not on file   Tobacco Use    Smoking status: Former Smoker     Packs/day: 1.00     Years: 50.00     Pack years: 50.00   Substance and Sexual Activity    Alcohol use: Yes     Comment: dtrs state amount unknown    Drug use: No    Sexual activity: Not on file   Other Topics Concern    Not on file   Social History Narrative    ** Merged History Encounter **          Social Determinants of Health Financial Resource Strain:     Difficulty of Paying Living Expenses:    Food Insecurity:     Worried About Running Out of Food in the Last Year:     [address] in the Last Year:    Transportation Needs:     Lack of Transportation (Medical):  Lack of Transportation (Non-Medical):    Physical Activity:     Days of Exercise per Week:     Minutes of Exercise per Session:    Stress:     Feeling of Stress :    Social Connections:     Frequency of Communication with Friends and Family:     Frequency of Social Gatherings with Friends and Family:     Attends [employer] Services:     Active Member of Clubs or Organizations:     Attends Club or Organization Meetings:     Marital Status:    Intimate Partner Violence:     Fear of Current or Ex-Partner:     Emotionally Abused:     Physically Abused:     Sexually Abused: ALLERGIES: Codeine, Ibuprofen, and Pcn [penicillins]    Review of Systems   Unable to perform ROS: Mental status change       There were no vitals filed for this visit. Physical Exam  Constitutional:       General: She is not in acute distress. Appearance: She is well-developed. HENT:      Head: Normocephalic. Cardiovascular:      Rate and Rhythm: Tachycardia present. Rhythm irregular. Heart sounds: No murmur heard. Pulmonary:      Effort: Pulmonary effort is normal.      Comments: Decreased breath sounds bilaterally  Abdominal:      Palpations: Abdomen is soft. Comments: There is possible tenderness in the left upper quadrant. Musculoskeletal:         General: Normal range of motion. Cervical back: Normal range of motion. Right lower leg: No edema. Left lower leg: No edema. Skin:     General: Skin is warm and dry. Capillary Refill: Capillary refill takes less than 2 seconds. Neurological:      Mental Status: She is alert. MDM       Procedures ED EKG interpretation:  Rhythm: Atrial fibrillation, rate 158. Incomplete right bundle branch block. T wave changes compatible with inferior  lateral ischemia. This EKG was interpreted by Darshan Harris MD,ED Provider. Perfect Serve Consult for Admission  12:34 PM    ED Room Number: ER12/12  Patient Name and age:  Ronny Kaur 80 y.o.  female  Working Diagnosis:   1. Atrial fibrillation, unspecified type ([address]    2. Acute respiratory failure with hypoxia (HCC)    3. Community acquired pneumonia, unspecified laterality        COVID-19 Suspicion:  no  Sepsis present:  no  Reassessment needed: no  Code Status:  Full Code  Readmission: no  Isolation Requirements:  no  Recommended Level of Care:  telemetry  Department:[facility] Adult ED - [phone]  Other: Patient with shortness of breath since yesterday with sats in the 80s. On arrival here found to be in atrial fibrillation with RVR. Chest x-ray shows bilateral infiltrates. BNP and D-dimer are both elevated. CTA of chest pending.

## 2021-10-14 NOTE — H&P
History & Physical    Primary Care Provider: Sadiq Pro MD  Source of Information: Patient    History of Presenting Illness:   Anna Hoskins is a 80 y.o. female who presents with shortness of breath    Patient presented to the hospital with hypoxia, apparently patient has home health and they noticed that the patient's oxygen saturation has been running low, it was running in the 80s, patient was brought to the hospital.  Patient is a poor historian, has history of dementia, so not much history could be obtained from the patient, patient is complaining of some abdominal pain, when patient arrived to the hospital was found to be in A. fib with RVR, was requested to be admitted to the hospitalist service. Patient currently appears somewhat uncomfortable. I tried calling patient's daughter Brian Zuñiga, and left a voicemail         Review of Systems:  Review of systems not obtained due to patient factors.   Dementia  All other systems reviewed, pertinent positives and negatives noted in HPI    Past Medical History:   Diagnosis Date    Alcoholism ([address] 11/10/2011    Arthritis     COPD (chronic obstructive pulmonary disease) ([address] 2/18/2021    Dementia     Dementia ([address] 11/10/2011    Depression 11/10/2011    Diverticulosis 1/27/2014    HTN (hypertension) 6/18/2012    Hyperlipidemia 6/24/2012    Hypertension     Hypovitaminosis D 12/17/2013    Intraventricular hemorrhage, nontraumatic ([address] 6/18/2012    Mass of pancreas 1/27/2014    1.5 CM DENSITY POST HEAD OF PANCREAS ON CT 1/27/2014    Osteoporosis 12/16/2013    Other ill-defined conditions(799.89)     broken right shoulder    Stroke [facility])       Past Surgical History:   Procedure Laterality Date    HX APPENDECTOMY      HX CATARACT REMOVAL      HX CHOLECYSTECTOMY      HX HYSTERECTOMY      HX ORTHOPAEDIC      repair lt shoulder fx     Prior to Admission medications    Medication Sig Start Date End Date Taking? Authorizing Provider   budesonide (PULMICORT) 0.5 mg/2 mL nbsp  9/15/21   Other, MD Robert   albuterol-ipratropium (DUO-NEB) 2.5 mg-0.5 mg/3 ml nebu  9/24/21   Other, MD Robert   predniSONE (DELTASONE) 10 mg tablet Daily taper : 4 --4 -4 -8-8-8-1-1-1-stop. Take with food; start 4/9/21 9/8/21   Ela Rust IV, MD   predniSONE (DELTASONE) 5 mg tablet Take 1 Tablet by mouth daily. 9/8/21   Ela Rust IV, MD   escitalopram oxalate (LEXAPRO) 10 mg tablet TAKE ONE TABLET BY MOUTH EVERY DAY 5/27/21   Ela Rust IV, MD   nystatin (MYCOSTATIN) 100,000 unit/mL suspension Take 5 mL by mouth four (4) times daily. Dab her mouth with one teaspoonful of Nystatin 4 times daily 2/24/21   Ela Rust IV, MD   albuterol (PROVENTIL VENTOLIN) 2.5 mg /3 mL (0.083 %) nebu USE 1 VIAL VIA NEBULIZER EVERY 4 HOURS AS NEEDED FOR WHEEZING 2/24/21   Ela Rust IV, MD   albuterol (PROVENTIL VENTOLIN) 2.5 mg /3 mL (0.083 %) nebu 3 mL by Nebulization route four (4) times daily. 2/24/21   Ela Rust IV, MD   amLODIPine (NORVASC) 5 mg tablet Take 1 Tab by mouth daily. 2/17/21   Ela Rust IV, MD   albuterol (Ventolin HFA) 90 mcg/actuation inhaler INHALE 2 PUFFS EVERY 4 HOURS AS NEEDED FOR WHEEZING 1/2/21   Polo Shaver MD   inhalational spacing device 1 Each by Does Not Apply route as needed (wheezing). 1/2/21   Polo Shaver MD   nystatin (Nyamyc) powder APPLY TO AFFECTED AREA 4 TIMES A DAY. 9/4/20   Nadeen Wright MD   Qiewndb-Idnfumyggci-YY-Acetam (NYQUIL D) 6.25-30- mg/15 mL liqd Take 30 mL by mouth every four to six (4-6) hours as needed. 12/28/18   Paula Chase MD   nystatin (MYCOSTATIN) powder Apply  to affected area four (4) times daily. 9/14/17   Paula Chase MD   acetaminophen (TYLENOL) 325 mg tablet Take  by mouth every four (4) hours as needed for Pain.     Provider, Historical   cholecalciferol, vitamin D3, (VITAMIN D3) 2,000 unit tab Take 1 Tab by mouth daily. Provider, Historical     Allergies   Allergen Reactions    Codeine Hives     Pt cannot really remember allergy, uncertain of reactioni    Ibuprofen Other (comments)     Abn LFT'S & FEVER    Pcn [Penicillins] Hives      No family history on file. Family history was discussed with the patient, all pertinent and relevant details are mentioned as above, no other pertinent and relevant family history was noted on my discussion with the patient. Patient specifically denies any history of Gaucher disease in the family  SOCIAL HISTORY:  Patient resides:  Independently X   Assisted Living    SNF    With family care       Smoking history:   None    Former X   Chronic      Alcohol history:   None    Social X   Chronic      Ambulates:   Independently X   w/cane    w/walker    w/wc    CODE STATUS:  DNR    Full X   Other      Objective:     Physical Exam:     Visit Vitals  /82   Pulse (!) 135   Temp 98.2 °F (36.8 °C)   Resp 24   SpO2 96%      O2 Device: None (Room air)    General : alert x 1, awake, mildly distressed, patient with dementia  HEENT: PEERL ABD PIV, moist mucus membrane, TM clear  Neck: supple,  Chest: Coarse breath sound bilateral lung bases  CVS: Irregularly irregular  Abd: soft/ Non tender, non distended, BS physiological,   Ext: no clubbing, no cyanosis, no edema, brisk 2+ DP pulses  Neuro/Psych: Limited exam, moves all 4, strength could not be tested  Skin: warm     EKG: A. fib with RVR      Data Review:     Recent Days:  Recent Labs     10/14/21  1115   WBC 11.9*   HGB 10.7*   HCT 34.9*        Recent Labs     10/14/21  1115      K 3.9      CO2 25   *   BUN 19   CREA 1.04*   CA 9.1   ALB 3.0*   ALT 29     No results for input(s): PH, PCO2, PO2, HCO3, FIO2 in the last 72 hours.     24 Hour Results:  Recent Results (from the past 24 hour(s))   EKG, 12 LEAD, INITIAL    Collection Time: 10/14/21 10:54 AM   Result Value Ref Range    Ventricular Rate 158 BPM    QRS Duration 110 ms    Q-T Interval 308 ms    QTC Calculation (Bezet) 499 ms    Calculated R Axis -7 degrees    Calculated T Axis -69 degrees    Diagnosis       Critical Test Result: High HR  Atrial fibrillation with rapid ventricular response  Incomplete right bundle branch block  ST & T wave abnormality, consider inferior ischemia  ST & T wave abnormality, consider anterolateral ischemia  When compared with ECG of 10-FEB-2021 13:23,  Atrial fibrillation has replaced Sinus rhythm  Vent. rate has increased BY  65 BPM  Incomplete right bundle branch block is now present  Confirmed by Tiara Fish MD, Kirit Lin ([id number]) on 10/14/2021 12:21:38 PM     CBC WITH AUTOMATED DIFF    Collection Time: 10/14/21 11:15 AM   Result Value Ref Range    WBC 11.9 (H) 3.6 - 11.0 K/uL    RBC 3.86 3.80 - 5.20 M/uL    HGB 10.7 (L) 11.5 - 16.0 g/dL    HCT 34.9 (L) 35.0 - 47.0 %    MCV 90.4 80.0 - 99.0 FL    MCH 27.7 26.0 - 34.0 PG    MCHC 30.7 30.0 - 36.5 g/dL    RDW 16.5 (H) 11.5 - 14.5 %    PLATELET 680 598 - 288 K/uL    MPV 12.2 8.9 - 12.9 FL    NRBC 0.0 0  WBC    ABSOLUTE NRBC 0.00 0.00 - 0.01 K/uL    NEUTROPHILS 76 (H) 32 - 75 %    LYMPHOCYTES 13 12 - 49 %    MONOCYTES 8 5 - 13 %    EOSINOPHILS 1 0 - 7 %    BASOPHILS 1 0 - 1 %    IMMATURE GRANULOCYTES 1 (H) 0.0 - 0.5 %    ABS. NEUTROPHILS 9.2 (H) 1.8 - 8.0 K/UL    ABS. LYMPHOCYTES 1.6 0.8 - 3.5 K/UL    ABS. MONOCYTES 0.9 0.0 - 1.0 K/UL    ABS. EOSINOPHILS 0.1 0.0 - 0.4 K/UL    ABS. BASOPHILS 0.1 0.0 - 0.1 K/UL    ABS. IMM.  GRANS. 0.1 (H) 0.00 - 0.04 K/UL    DF AUTOMATED     METABOLIC PANEL, COMPREHENSIVE    Collection Time: 10/14/21 11:15 AM   Result Value Ref Range    Sodium 136 136 - 145 mmol/L    Potassium 3.9 3.5 - 5.1 mmol/L    Chloride 108 97 - 108 mmol/L    CO2 25 21 - 32 mmol/L    Anion gap 3 (L) 5 - 15 mmol/L    Glucose 128 (H) 65 - 100 mg/dL    BUN 19 6 - 20 MG/DL    Creatinine 1.04 (H) 0.55 - 1.02 MG/DL    BUN/Creatinine ratio 18 12 - 20      GFR est AA >60 >60 ml/min/1.73m2    GFR est non-AA 50 (L) >60 ml/min/1.73m2    Calcium 9.1 8.5 - 10.1 MG/DL    Bilirubin, total 0.5 0.2 - 1.0 MG/DL    ALT (SGPT) 29 12 - 78 U/L    AST (SGOT) 11 (L) 15 - 37 U/L    Alk. phosphatase 122 (H) 45 - 117 U/L    Protein, total 7.8 6.4 - 8.2 g/dL    Albumin 3.0 (L) 3.5 - 5.0 g/dL    Globulin 4.8 (H) 2.0 - 4.0 g/dL    A-G Ratio 0.6 (L) 1.1 - 2.2     SAMPLES BEING HELD    Collection Time: 10/14/21 11:15 AM   Result Value Ref Range    SAMPLES BEING HELD 1RED     COMMENT        Add-on orders for these samples will be processed based on acceptable specimen integrity and analyte stability, which may vary by analyte. TROPONIN-HIGH SENSITIVITY    Collection Time: 10/14/21 11:15 AM   Result Value Ref Range    Troponin-High Sensitivity 23 0 - 51 ng/L   NT-PRO BNP    Collection Time: 10/14/21 11:15 AM   Result Value Ref Range    NT pro-BNP 7,504 (H) <450 PG/ML   D DIMER    Collection Time: 10/14/21 11:15 AM   Result Value Ref Range    D-dimer 3.66 (H) 0.00 - 0.65 mg/L FEU   LIPASE    Collection Time: 10/14/21 11:15 AM   Result Value Ref Range    Lipase 104 73 - 393 U/L         Imaging:   XR CHEST PORT    Result Date: 10/14/2021  Bibasilar infiltrates right greater than left suggest pneumonia.     Assessment/Plan     A. fib with RVR  Acute hypoxic respiratory failure  Community-acquired pneumonia  Hypertension  Hyperlipidemia        Patient will be admitted on a telemetry bed  Patient placed on a diltiazem gtt., echocardiogram, cycle troponins, rate control, cardiology consult, defer to cardiology for anticoagulation recommendations as patient with history of GI bleed, ICH and advanced age, patient also appears to be a fall risk due to dementia, close monitoring and further intervention per hospital course  Likely secondary to underlying pneumonia, start patient on broad-spectrum IV antibiotics, oxygen support, ABG, pulse ox monitoring, duo nebs, cycle troponins, blood cultures, COVID-19 testing, supportive care and close monitoring, CTA chest and CT abdomen pending  Hold amlodipine as patient is on a diltiazem gtt. Continue statin    GI DVT prophylaxis: Patient will be on SCD         CRITICAL CARE ATTESTATION:  I had a face to face encounter with the patient, reviewed and interpreted patient data including clinical events, labs, images, vital signs, I/O's, and examined patient. I have discussed the case and the plan and management of the patient's care with the consulting services, the bedside nurses and necessary ancillary providers. NOTE OF PERSONAL INVOLVEMENT IN CARE   This patient has a high probability of imminent, clinically significant deterioration, which requires the highest level of preparedness to intervene urgently. I participated in the decision-making and personally managed or directed the management of the following life and organ supporting interventions that required my frequent assessment to treat or prevent imminent deterioration. I personally spent 50 minutes of critical care time. This is time spent at this critically ill patient's bedside actively involved in patient care as well as the coordination of care and discussions with the patient's family. This does not include any procedural time which has been billed separately. Please note that this dictation was completed with Grapeword, the [employer] voice recognition software. Quite often unanticipated grammatical, syntax, homophones, and other interpretive errors are inadvertently transcribed by the computer software. Please disregard these errors. Please excuse any errors that have escaped final proofreading.          Signed By: Margareth Singer MD     October 14, 2021

## 2021-10-14 NOTE — PROGRESS NOTES
Admission Medication Reconciliation:    Information obtained from:  rx query, patient  RxQuery data available¹:  YES    Comments/Recommendations: Updated PTA meds/reviewed patient's allergies. 1)  Patient is a poor historian. Utilized refill history and chart review mainly. 2)  Medication changes (since last review): Added  - none    Adjusted  - none    Removed  - prednisone taper from September    3)  Unsure if she is using topical nystatin or not. ¹RxQuery pharmacy benefit data reflects medications filled and processed through the patient's insurance, however   this data does NOT capture whether the medication was picked up or is currently being taken by the patient. Allergies:  Codeine, Ibuprofen, and Pcn [penicillins]    Significant PMH/Disease States:   Past Medical History:   Diagnosis Date    Alcoholism ([facility] 11/10/2011    Arthritis     COPD (chronic obstructive pulmonary disease) ([facility] 2/18/2021    Dementia     Dementia ([facility] 11/10/2011    Depression 11/10/2011    Diverticulosis 1/27/2014    HTN (hypertension) 6/18/2012    Hyperlipidemia 6/24/2012    Hypertension     Hypovitaminosis D 12/17/2013    Intraventricular hemorrhage, nontraumatic ([facility] 6/18/2012    Mass of pancreas 1/27/2014    1.5 CM DENSITY POST HEAD OF PANCREAS ON CT 1/27/2014    Osteoporosis 12/16/2013    Other ill-defined conditions(799.89)     broken right shoulder    Stroke [facility])      Chief Complaint for this Admission:    Chief Complaint   Patient presents with    Irregular Heart Beat     Prior to Admission Medications:   Prior to Admission Medications   Prescriptions Last Dose Informant Taking? Jssdfvp-Tptopjwsjwc-XK-Acetam (NYQUIL D) 6.25-30- mg/15 mL liqd   Yes   Sig: Take 30 mL by mouth every four to six (4-6) hours as needed. acetaminophen (TYLENOL) 325 mg tablet   Yes   Sig: Take  by mouth every four (4) hours as needed for Pain.    albuterol (PROVENTIL VENTOLIN) 2.5 mg /3 mL (0.083 %) nebu   Yes   Sig: USE 1 VIAL VIA NEBULIZER EVERY 4 HOURS AS NEEDED FOR WHEEZING   albuterol (PROVENTIL VENTOLIN) 2.5 mg /3 mL (0.083 %) nebu   Yes   Sig: 3 mL by Nebulization route four (4) times daily. albuterol (Ventolin HFA) 90 mcg/actuation inhaler   Yes   Sig: INHALE 2 PUFFS EVERY 4 HOURS AS NEEDED FOR WHEEZING   albuterol-ipratropium (DUO-NEB) 2.5 mg-0.5 mg/3 ml nebu   Yes   Sig: 3 mL by Nebulization route every four (4) hours as needed. amLODIPine (NORVASC) 5 mg tablet   Yes   Sig: Take 1 Tab by mouth daily. budesonide (PULMICORT) 0.5 mg/2 mL nbsp   Yes   Si mcg by Nebulization route two (2) times a day. cholecalciferol, vitamin D3, (VITAMIN D3) 2,000 unit tab   Yes   Sig: Take 1 Tab by mouth daily. escitalopram oxalate (LEXAPRO) 10 mg tablet   Yes   Sig: TAKE ONE TABLET BY MOUTH EVERY DAY   inhalational spacing device   Yes   Si Each by Does Not Apply route as needed (wheezing). nystatin (MYCOSTATIN) 100,000 unit/mL suspension   No   Sig: Take 5 mL by mouth four (4) times daily. Dab her mouth with one teaspoonful of Nystatin 4 times daily   nystatin (MYCOSTATIN) powder Unknown at Unknown time  No   Sig: Apply  to affected area four (4) times daily. nystatin (Nyamyc) powder Unknown at Unknown time  No   Sig: APPLY TO AFFECTED AREA 4 TIMES A DAY. predniSONE (DELTASONE) 5 mg tablet   Yes   Sig: Take 1 Tablet by mouth daily. Facility-Administered Medications: None     Please contact the main inpatient pharmacy with any questions or concerns at [phone] and we will direct you to the clinical pharmacist covering this patient's care while in-house.    Daniel Dewitt

## 2021-10-14 NOTE — PROGRESS NOTES
1930: Charting and patient care of Clayborn Climes by Katherine Harman RN from 1893 to [id number] was supervised and reviewed by this RN.

## 2021-10-15 LAB
ANION GAP SERPL CALC-SCNC: 6 MMOL/L (ref 5–15)
BASOPHILS # BLD: 0.1 K/UL (ref 0–0.1)
BASOPHILS NFR BLD: 1 % (ref 0–1)
BUN SERPL-MCNC: 14 MG/DL (ref 6–20)
BUN/CREAT SERPL: 16 (ref 12–20)
CALCIUM SERPL-MCNC: 8.6 MG/DL (ref 8.5–10.1)
CHLORIDE SERPL-SCNC: 111 MMOL/L (ref 97–108)
CO2 SERPL-SCNC: 22 MMOL/L (ref 21–32)
CREAT SERPL-MCNC: 0.9 MG/DL (ref 0.55–1.02)
DIFFERENTIAL METHOD BLD: ABNORMAL
EOSINOPHIL # BLD: 0.1 K/UL (ref 0–0.4)
EOSINOPHIL NFR BLD: 1 % (ref 0–7)
ERYTHROCYTE [DISTWIDTH] IN BLOOD BY AUTOMATED COUNT: 16.3 % (ref 11.5–14.5)
GLUCOSE BLD STRIP.AUTO-MCNC: 107 MG/DL (ref 65–117)
GLUCOSE SERPL-MCNC: 119 MG/DL (ref 65–100)
HCT VFR BLD AUTO: 31.3 % (ref 35–47)
HGB BLD-MCNC: 9.4 G/DL (ref 11.5–16)
IMM GRANULOCYTES # BLD AUTO: 0.1 K/UL (ref 0–0.04)
IMM GRANULOCYTES NFR BLD AUTO: 1 % (ref 0–0.5)
LYMPHOCYTES # BLD: 1.1 K/UL (ref 0.8–3.5)
LYMPHOCYTES NFR BLD: 14 % (ref 12–49)
MCH RBC QN AUTO: 27.2 PG (ref 26–34)
MCHC RBC AUTO-ENTMCNC: 30 G/DL (ref 30–36.5)
MCV RBC AUTO: 90.5 FL (ref 80–99)
MONOCYTES # BLD: 0.6 K/UL (ref 0–1)
MONOCYTES NFR BLD: 7 % (ref 5–13)
NEUTS SEG # BLD: 6.5 K/UL (ref 1.8–8)
NEUTS SEG NFR BLD: 76 % (ref 32–75)
NRBC # BLD: 0 K/UL (ref 0–0.01)
NRBC BLD-RTO: 0 PER 100 WBC
PLATELET # BLD AUTO: 247 K/UL (ref 150–400)
PMV BLD AUTO: 11.3 FL (ref 8.9–12.9)
POTASSIUM SERPL-SCNC: 3.8 MMOL/L (ref 3.5–5.1)
PROCALCITONIN SERPL-MCNC: <0.05 NG/ML
RBC # BLD AUTO: 3.46 M/UL (ref 3.8–5.2)
SERVICE CMNT-IMP: NORMAL
SODIUM SERPL-SCNC: 139 MMOL/L (ref 136–145)
T4 SERPL-MCNC: 6.4 UG/DL (ref 4.8–13.9)
TROPONIN-HIGH SENSITIVITY: 17 NG/L (ref 0–51)
TROPONIN-HIGH SENSITIVITY: 18 NG/L (ref 0–51)
TROPONIN-HIGH SENSITIVITY: 23 NG/L (ref 0–51)
WBC # BLD AUTO: 8.4 K/UL (ref 3.6–11)

## 2021-10-15 PROCEDURE — 74011000250 HC RX REV CODE- 250: Performed by: EMERGENCY MEDICINE

## 2021-10-15 PROCEDURE — 74011000250 HC RX REV CODE- 250: Performed by: PHYSICIAN ASSISTANT

## 2021-10-15 PROCEDURE — 74011250636 HC RX REV CODE- 250/636: Performed by: FAMILY MEDICINE

## 2021-10-15 PROCEDURE — 65660000000 HC RM CCU STEPDOWN

## 2021-10-15 PROCEDURE — 74011250636 HC RX REV CODE- 250/636: Performed by: INTERNAL MEDICINE

## 2021-10-15 PROCEDURE — 80048 BASIC METABOLIC PNL TOTAL CA: CPT

## 2021-10-15 PROCEDURE — 99232 SBSQ HOSP IP/OBS MODERATE 35: CPT | Performed by: INTERNAL MEDICINE

## 2021-10-15 PROCEDURE — 77010033678 HC OXYGEN DAILY

## 2021-10-15 PROCEDURE — 36415 COLL VENOUS BLD VENIPUNCTURE: CPT

## 2021-10-15 PROCEDURE — 74011000258 HC RX REV CODE- 258: Performed by: EMERGENCY MEDICINE

## 2021-10-15 PROCEDURE — 84436 ASSAY OF TOTAL THYROXINE: CPT

## 2021-10-15 PROCEDURE — 84484 ASSAY OF TROPONIN QUANT: CPT

## 2021-10-15 PROCEDURE — 82962 GLUCOSE BLOOD TEST: CPT

## 2021-10-15 PROCEDURE — 74011000250 HC RX REV CODE- 250: Performed by: FAMILY MEDICINE

## 2021-10-15 PROCEDURE — 94760 N-INVAS EAR/PLS OXIMETRY 1: CPT

## 2021-10-15 PROCEDURE — 92610 EVALUATE SWALLOWING FUNCTION: CPT | Performed by: SPEECH-LANGUAGE PATHOLOGIST

## 2021-10-15 PROCEDURE — 74011250637 HC RX REV CODE- 250/637: Performed by: FAMILY MEDICINE

## 2021-10-15 PROCEDURE — 85025 COMPLETE CBC W/AUTO DIFF WBC: CPT

## 2021-10-15 PROCEDURE — 94640 AIRWAY INHALATION TREATMENT: CPT

## 2021-10-15 RX ORDER — METRONIDAZOLE 500 MG/100ML
500 INJECTION, SOLUTION INTRAVENOUS EVERY 8 HOURS
Status: DISCONTINUED | OUTPATIENT
Start: 2021-10-15 | End: 2021-10-19

## 2021-10-15 RX ADMIN — METOPROLOL TARTRATE 5 MG: 5 INJECTION INTRAVENOUS at 13:45

## 2021-10-15 RX ADMIN — METOPROLOL TARTRATE 5 MG: 5 INJECTION INTRAVENOUS at 23:46

## 2021-10-15 RX ADMIN — WATER 1 G: 1 INJECTION INTRAMUSCULAR; INTRAVENOUS; SUBCUTANEOUS at 19:42

## 2021-10-15 RX ADMIN — METRONIDAZOLE 500 MG: 500 INJECTION, SOLUTION INTRAVENOUS at 07:27

## 2021-10-15 RX ADMIN — Medication 10 ML: at 21:22

## 2021-10-15 RX ADMIN — METRONIDAZOLE 500 MG: 500 INJECTION, SOLUTION INTRAVENOUS at 23:41

## 2021-10-15 RX ADMIN — ACETAMINOPHEN 650 MG: 325 TABLET ORAL at 23:53

## 2021-10-15 RX ADMIN — IPRATROPIUM BROMIDE AND ALBUTEROL SULFATE 3 ML: .5; 3 SOLUTION RESPIRATORY (INHALATION) at 04:14

## 2021-10-15 RX ADMIN — Medication 10 ML: at 16:38

## 2021-10-15 RX ADMIN — METOPROLOL TARTRATE 5 MG: 5 INJECTION INTRAVENOUS at 00:03

## 2021-10-15 RX ADMIN — ACETAMINOPHEN 650 MG: 325 TABLET ORAL at 03:26

## 2021-10-15 RX ADMIN — DILTIAZEM HYDROCHLORIDE 15 MG/HR: 5 INJECTION, SOLUTION INTRAVENOUS at 03:13

## 2021-10-15 RX ADMIN — DILTIAZEM HYDROCHLORIDE 7.5 MG/HR: 5 INJECTION, SOLUTION INTRAVENOUS at 19:43

## 2021-10-15 RX ADMIN — METOPROLOL TARTRATE 5 MG: 5 INJECTION INTRAVENOUS at 05:40

## 2021-10-15 RX ADMIN — METRONIDAZOLE 500 MG: 500 INJECTION, SOLUTION INTRAVENOUS at 17:20

## 2021-10-15 RX ADMIN — DILTIAZEM HYDROCHLORIDE 10 MG/HR: 5 INJECTION, SOLUTION INTRAVENOUS at 19:00

## 2021-10-15 RX ADMIN — AZITHROMYCIN MONOHYDRATE 500 MG: 500 INJECTION, POWDER, LYOPHILIZED, FOR SOLUTION INTRAVENOUS at 00:03

## 2021-10-15 RX ADMIN — SODIUM CHLORIDE 125 ML/HR: 9 INJECTION, SOLUTION INTRAVENOUS at 23:37

## 2021-10-15 RX ADMIN — SODIUM CHLORIDE 500 ML: 9 INJECTION, SOLUTION INTRAVENOUS at 12:25

## 2021-10-15 RX ADMIN — ESCITALOPRAM OXALATE 10 MG: 10 TABLET ORAL at 17:24

## 2021-10-15 RX ADMIN — METOPROLOL TARTRATE 5 MG: 5 INJECTION INTRAVENOUS at 17:23

## 2021-10-15 RX ADMIN — IPRATROPIUM BROMIDE AND ALBUTEROL SULFATE 3 ML: .5; 3 SOLUTION RESPIRATORY (INHALATION) at 07:22

## 2021-10-15 RX ADMIN — DILTIAZEM HYDROCHLORIDE 12.5 MG/HR: 5 INJECTION, SOLUTION INTRAVENOUS at 18:30

## 2021-10-15 RX ADMIN — Medication 10 ML: at 05:40

## 2021-10-15 RX ADMIN — IPRATROPIUM BROMIDE AND ALBUTEROL SULFATE 3 ML: .5; 3 SOLUTION RESPIRATORY (INHALATION) at 00:42

## 2021-10-15 RX ADMIN — DILTIAZEM HYDROCHLORIDE 15 MG/HR: 5 INJECTION, SOLUTION INTRAVENOUS at 13:35

## 2021-10-15 NOTE — PROGRESS NOTES
[facility]      Cardiology Care Note                               established visit      Consulting Cardiologist: Alayna Zapien MD     Subjective:   Asleep in room. Arouses little. Daughter in room. Confirms no h/o Afib. Notes patient does aspirate. SLP comes to house. Patient takes pureed diet, usually       Assessment and Plan     1. Afib with RVR   - continue Cardizem gtt and Lopressor 5 mg IV q 6   - TSH 15.10. T4 pending   - echo ordered- pending  2. Hypoxic respiratory failure   - acute   - chest CT suggesting aspiration, with retained Debris/secretions in the central airways  3. COPD   - PTA DuoNebs, Pulmicort  4. H/o ETOH use/abuse   - Marjorie negative  5. Dementia   - noted poor historian  6. HTN   - PTA Norvasc  7. COVID Negative  8. H/o Aspiration   - differential includes PNA    - Chest CTA Debris/secretions in the central airways    Afib with RVR, suspect precipitated by current infection. Suspect Aspiration PNA. TSH elevated, T4 pending. Continue on Cardizem gtt and Lopressor IV for now. Echo pending. Will have to explore +/- of [address] [place] [address] with daughter. Will follow up on echo results. Evaluated pt and agree with above assessment plan. Afib exacerbated by infection/aspiration PNA. Continue cardizem gtt for rate control, IV BB with hold parameters. Hct trending down. If stabilizes and no contraindication to [address] [place], [place] [address] can sort this out prior to hospital dc but not at this time.   Ramana Jenkins MD    ____________________________________________________________      HPI:  Patient presented to the hospital with hypoxia, apparently patient has home health and they noticed that the patient's oxygen saturation has been running low, it was running in the 80s, patient was brought to the hospital.  Patient is a poor historian, has history of dementia, so not much history could be obtained from the patient, patient is complaining of some abdominal pain, when patient arrived to the hospital was found to be in A. fib with RVR,         Patient Active Problem List   Diagnosis Code    Right hip pain M25.551    Dementia ([facility] F03.90    Depression F32. A    Intraventricular hemorrhage, nontraumatic (HCC) I61.5    HTN (hypertension) I10    Hyperlipidemia E78.5    Alkaline phosphatase elevation R74.8    Altered mental status R41.82    Low back pain M54.50    Osteoporosis M81.0    Hypovitaminosis D E55.9    GI bleed K92.2    Rectal bleeding K62.5    Mass of pancreas K86.89    Diverticulosis K57.90    Pneumonia J18.9    COPD (chronic obstructive pulmonary disease) (HCC) J44.9    Atrial fibrillation (HCC) I48.91     No specialty comments available.       Review of Systems:    [x] Patient unable to provide secondary to condition         Past Medical History:   Diagnosis Date    Alcoholism ([facility] 11/10/2011    Arthritis     COPD (chronic obstructive pulmonary disease) ([facility] 2/18/2021    Dementia     [facility] 11/10/2011    Depression 11/10/2011    Diverticulosis 1/27/2014    HTN (hypertension) 6/18/2012    Hyperlipidemia 6/24/2012    Hypertension     Hypovitaminosis D 12/17/2013    Intraventricular hemorrhage, nontraumatic ([facility] 6/18/2012    Mass of pancreas 1/27/2014    1.5 CM DENSITY POST HEAD OF PANCREAS ON CT 1/27/2014    Osteoporosis 12/16/2013    Other ill-defined conditions(799.89)     broken right shoulder    Stroke [facility])      Past Surgical History:   Procedure Laterality Date    HX APPENDECTOMY      HX CATARACT REMOVAL      HX CHOLECYSTECTOMY      HX HYSTERECTOMY      HX ORTHOPAEDIC      repair lt shoulder fx     Current Facility-Administered Medications   Medication Dose Route Frequency    metroNIDAZOLE (FLAGYL) IVPB premix 500 mg  500 mg IntraVENous Q8H    dilTIAZem (CARDIZEM) 125 mg in dextrose 5% 125 mL infusion  0-15 mg/hr IntraVENous TITRATE    [Held by provider] amLODIPine (NORVASC) tablet 5 mg 5 mg Oral DAILY    escitalopram oxalate (LEXAPRO) tablet 10 mg  10 mg Oral QPM    sodium chloride (NS) flush 5-40 mL  5-40 mL IntraVENous Q8H    sodium chloride (NS) flush 5-40 mL  5-40 mL IntraVENous PRN    0.9% sodium chloride infusion  75 mL/hr IntraVENous CONTINUOUS    acetaminophen (TYLENOL) tablet 650 mg  650 mg Oral Q4H PRN    cefTRIAXone (ROCEPHIN) 1 g in sterile water (preservative free) 10 mL IV syringe  1 g IntraVENous Q24H    azithromycin (ZITHROMAX) 500 mg in 0.9% sodium chloride 250 mL (VIAL-MATE)  500 mg IntraVENous Q24H    albuterol-ipratropium (DUO-NEB) 2.5 MG-0.5 MG/3 ML  3 mL Nebulization Q4H RT    metoprolol (LOPRESSOR) injection 5 mg  5 mg IntraVENous Q6H       Allergies   Allergen Reactions    Codeine Hives     Pt cannot really remember allergy, uncertain of reactioni    Ibuprofen Other (comments)     Abn LFT'S & FEVER    Pcn [Penicillins] Hives        FmHx: family history is not on file. Social Hx :  reports that she has quit smoking. She has a 50.00 pack-year smoking history. She does not have any smokeless tobacco history on file. She reports current alcohol use. She reports that she does not use drugs. Objective:    Physical Exam:  Vitals:  Vitals:    10/15/21 0700 10/15/21 0723 10/15/21 0730 10/15/21 0745   BP: (!) 114/51  (!) 138/48    Pulse: 92  (!) 106    Resp:   18    Temp:   98.2 °F (36.8 °C)    SpO2: 96% 95% 99% 99%   Weight:         General:    Frail,  In NAD   EYES:    Non icteric sclera. ENMT   No epistaxis, normal dentition, moist oral mucosa   Neck:   Supple and no JVD. Lungs:     Rhonchi to auscultation bilaterally. No increased WOB   Heart:    Regular rate and rhythm, S1, S2 normal, no murmur, click, rub or gallop. Abdomen:     Soft, non-tender. Bowel sounds normal. No masses palpated   Extremities:   Extremities normal, atraumatic, no cyanosis, clubbing. No edema.    Vascular:   Pulses - 2+ radials   Skin:   Skin color normal. No rashes or lesions on visible areas   Neurologic:   Arouses some. Does not follow commands for CN test       Telemetry: AFIB    ECG:   EKG Results     Procedure 720 Value Units Date/Time    EKG, 12 LEAD, INITIAL [[id number]] Collected: 10/14/21 1054    Order Status: Completed Updated: 10/14/21 1221     Ventricular Rate 158 BPM      QRS Duration 110 ms      Q-T Interval 308 ms      QTC Calculation (Bezet) 499 ms      Calculated R Axis -7 degrees      Calculated T Axis -69 degrees      Diagnosis --      Critical Test Result: High HR  Atrial fibrillation with rapid ventricular response  Incomplete right bundle branch block  ST & T wave abnormality, consider inferior ischemia  ST & T wave abnormality, consider anterolateral ischemia  When compared with ECG of 10-FEB-2021 13:23,  Atrial fibrillation has replaced Sinus rhythm  Vent. rate has increased BY  65 BPM  Incomplete right bundle branch block is now present  Confirmed by Tania Yañez MD, Torrie Dave ([id number]) on 10/14/2021 12:21:38 PM            Data Review:     Radiology:   XR Results (most recent):  Results from [place] encounter on 10/14/21    XR CHEST PORT    Narrative  EXAM: XR CHEST PORT    INDICATION: Chest Pain    COMPARISON: February 10, 2021 and CT scan of 5/12/2021    FINDINGS: A portable AP radiograph of the chest was obtained at 1115 hours. The  patient is on a cardiac monitor. The heart size is normal. Is atherosclerotic  change of aorta. Lungs demonstrate new ill-defined areas of infiltrate at both  lung bases right greater than left. This suggest pneumonia. Follow-up to  resolution is suggested. Old left humerus fracture is noted. Impression  Bibasilar infiltrates right greater than left suggest pneumonia. No results for input(s): CPK, TROIQ in the last 72 hours.     No lab exists for component: CKQMB, CPKMB, BMPP  Recent Labs     10/15/21  0539 10/14/21  1115    136   K 3.8 3.9   * 108   CO2 22 25   BUN 14 19   CREA 0.90 1.04*   * 128* CA 8.6 9.1     Recent Labs     10/15/21  0539 10/14/21  1115   WBC 8.4 11.9*   HGB 9.4* 10.7*   HCT 31.3* 34.9*    231     Recent Labs     10/14/21  1115   *     No results for input(s): CHOL, LDLC in the last 72 hours. No lab exists for component: TGL, HDLC,  HBA1C  Recent Labs     10/14/21  1115   TSH 15.10*       Efren Mondragon MD      [facility] [address] [place] [address] 13, [address] [facility] [address]  [facility]  [phone]      CC:Genaro Friedman MD

## 2021-10-15 NOTE — PROGRESS NOTES
[address] [facility] Adult  Hospitalist Group                                                                                          Hospitalist Progress Note  Leonor Gilmore MD  Answering service: [phone] from in house phone        Date of Service:  10/15/2021  NAME:  Catherine Veras  :  3/16/1931  MRN:  [id number]      Admission Summary:   Copied form admit: \" Catherine Veras is a 80 y.o. female who presents with shortness of breath     Patient presented to the hospital with hypoxia, apparently patient has home health and they noticed that the patient's oxygen saturation has been running low, it was running in the 80s, patient was brought to the hospital.  Patient is a poor historian, has history of dementia, so not much history could be obtained from the patient, patient is complaining of some abdominal pain, when patient arrived to the hospital was found to be in A. fib with RVR, was requested to be admitted to the hospitalist service. Patient currently appears somewhat uncomfortable. I tried calling patient's daughter Graciela Sarmiento, and left a voicemail \"    Interval history / Subjective:     10/15/2021 :    Passes speech for honey thickness/puree diet   Discussed with one of the daughters   Anticipate back home w  care as arranged by daughters PTA for pt and her   Fina Ca as below        Assessment & Plan:     A. fib with RVR- diltz drip, po replacement as feel pt can take; Else iv dig to po dig     Acute hypoxic respiratory failure -  /nebs/monitor     Community-acquired pneumonia- aspiration:  CT 10/14:  1. No pulmonary embolus. 2.  Pulmonary edema pattern with moderate right and small left pleural  effusions. 3.  Debris/secretions in the central airways, increased risk for aspiration  Pneumonitis.     Hypertension  Adjust meds as needed    Hyperlipidemia    Dysphagia: speech eval  - done, puree/honey thickness liquid        Hospital Problems  Date Reviewed: 2/12/2021        Codes Class Noted POA    Atrial fibrillation ([facility] [address] ICD-10-CM: I48.91  ICD-9-CM: 427.31  10/14/2021 Unknown                  After personally interviewing pt at bedside the following is noted on 10/15/2021 :    Review of Systems   Reason unable to perform ROS: dementia               I had a face to face encounter with patient on 10/15/2021 at bedside for the following physical exam:     PHYSICAL EXAM:    Visit Vitals  /69 (BP 1 Location: Left upper arm, BP Patient Position: At rest;Lying)   Pulse (!) 108   Temp 100.1 °F (37.8 °C)   Resp 22   Wt 61.7 kg (136 lb 1.6 oz)   SpO2 94%   BMI 24.89 kg/m²          Physical Exam  Constitutional:       General: She is not in acute distress. Appearance: She is obese. She is ill-appearing. HENT:      Head: Normocephalic and atraumatic. Right Ear: External ear normal.      Left Ear: External ear normal.      Mouth/Throat:      Mouth: Mucous membranes are dry. Pharynx: Oropharynx is clear. Eyes:      Extraocular Movements: Extraocular movements intact. Conjunctiva/sclera: Conjunctivae normal.      Pupils: Pupils are equal, round, and reactive to light. Cardiovascular:      Rate and Rhythm: Tachycardia present. Rhythm irregular. Pulmonary:      Comments: Coarse no wheezing   Abdominal:      General: Abdomen is flat. Bowel sounds are normal.      Palpations: Abdomen is soft. Musculoskeletal:         General: No swelling or deformity. Skin:     General: Skin is warm and dry. Neurological:      General: No focal deficit present.       Comments: Near baseline    Psychiatric:         Behavior: Behavior normal.      Comments: Not agitated not anxious                      Data Review:    Review and/or order of clinical lab test      Labs:     Recent Labs     10/15/21  0539 10/14/21  1115   WBC 8.4 11.9*   HGB 9.4* 10.7*   HCT 31.3* 34.9*    231     Recent Labs     10/15/21  0539 10/14/21  1115    136   K 3.8 3.9   * 108   CO2 22 25   BUN 14 19   CREA 0.90 1.04*   * 128*   CA 8.6 9.1   MG  --  2.4     Recent Labs     10/14/21  1115   ALT 29   *   TBILI 0.5   TP 7.8   ALB 3.0*   GLOB 4.8*   LPSE 104     No results for input(s): INR, PTP, APTT, INREXT in the last 72 hours. No results for input(s): FE, TIBC, PSAT, FERR in the last 72 hours. No results found for: FOL, RBCF   No results for input(s): PH, PCO2, PO2 in the last 72 hours. No results for input(s): CPK, CKNDX, TROIQ in the last 72 hours.     No lab exists for component: CPKMB  Lab Results   Component Value Date/Time    Cholesterol, total 190 06/24/2012 05:15 AM    HDL Cholesterol 30 06/24/2012 05:15 AM    LDL, calculated 130.4 (H) 06/24/2012 05:15 AM    Triglyceride 148 06/24/2012 05:15 AM    CHOL/HDL Ratio 6.3 (H) 06/24/2012 05:15 AM     Lab Results   Component Value Date/Time    Glucose (POC) 154 (H) 02/16/2021 11:13 AM    Glucose (POC) 116 (H) 06/18/2012 01:30 PM     Lab Results   Component Value Date/Time    Color YELLOW/STRAW 02/10/2021 03:33 PM    Appearance CLEAR 02/10/2021 03:33 PM    Specific gravity 1.019 02/10/2021 03:33 PM    pH (UA) 5.0 02/10/2021 03:33 PM    Protein TRACE (A) 02/10/2021 03:33 PM    Glucose Negative 02/10/2021 03:33 PM    Ketone Negative 02/10/2021 03:33 PM    Bilirubin Negative 02/10/2021 03:33 PM    Urobilinogen 0.2 02/10/2021 03:33 PM    Nitrites Negative 02/10/2021 03:33 PM    Leukocyte Esterase TRACE (A) 02/10/2021 03:33 PM    Epithelial cells FEW 02/10/2021 03:33 PM    Bacteria Negative 02/10/2021 03:33 PM    WBC 5-10 02/10/2021 03:33 PM    RBC 0-5 02/10/2021 03:33 PM         Medications Reviewed:     Current Facility-Administered Medications   Medication Dose Route Frequency    dilTIAZem (CARDIZEM) 125 mg in dextrose 5% 125 mL infusion  0-15 mg/hr IntraVENous TITRATE    [Held by provider] amLODIPine (NORVASC) tablet 5 mg  5 mg Oral DAILY    escitalopram oxalate (LEXAPRO) tablet 10 mg  10 mg Oral QPM    sodium chloride (NS) flush 5-40 mL  5-40 mL IntraVENous Q8H    sodium chloride (NS) flush 5-40 mL  5-40 mL IntraVENous PRN    0.9% sodium chloride infusion  75 mL/hr IntraVENous CONTINUOUS    acetaminophen (TYLENOL) tablet 650 mg  650 mg Oral Q4H PRN    cefTRIAXone (ROCEPHIN) 1 g in sterile water (preservative free) 10 mL IV syringe  1 g IntraVENous Q24H    azithromycin (ZITHROMAX) 500 mg in 0.9% sodium chloride 250 mL (VIAL-MATE)  500 mg IntraVENous Q24H    albuterol-ipratropium (DUO-NEB) 2.5 MG-0.5 MG/3 ML  3 mL Nebulization Q4H RT    metoprolol (LOPRESSOR) injection 5 mg  5 mg IntraVENous Q6H     ______________________________________________________________________  EXPECTED LENGTH OF STAY: - - -  ACTUAL LENGTH OF STAY:          1                 Jignesh Collins MD

## 2021-10-15 NOTE — PROGRESS NOTES
STEPHAN: anticipate d/c home with home health & family support pending clinical progression; Pt has [address] in the home; BLS Transport; Will need IMM letter prior to d/c    RUR: 12%  Reason for Admission:    Shortness of Breath, Afib with RVR                    RUR Score:          12%           Plan for utilizing home health:          PCP: First and Last name:  Abhi Iverson MD     Name of Practice:    Are you a current patient: Yes/No: YES   Approximate date of last visit:    Can you participate in a virtual visit with your PCP: YES                    Current Advanced Directive/Advance Care Plan: Full Code      Healthcare Decision Maker:   Click here to complete [address] including selection of the [address] Relationship (ie \"Primary\")                           Transition of Care Plan:                    [facility] reviewed pt chart and met with pt and daughterAgapito [phone] at bedside to discuss transitional planning. Pt resides with spouse and has 24/7 private pay nursing care in the home. Pt has three daughters. As per daughterAgapito, pt requires total assistance with adls and iadls. Dme is: w/c, RW, hospital bed and shower chair. CM confirmed pcp and demographics. D[facility] besides [facility] nursing care or rehab placement for pt. Likely BLS transport at d/c. Daughter advised that she is currently working with an [facility] on [place] and that she does not think there is currently a POA established. CM to follow for transitions of care. Hunter Russo RN BSN CCM  Care Management Interventions  PCP Verified by CM:  Yes  Palliative Care Criteria Met (RRAT>21 & CHF Dx)?: No  Mode of Transport at Discharge: BLS  Transition of Care Consult (CM Consult): Discharge Planning  MyChart Signup: No  Discharge Durable Medical Equipment: No (pt owns a hospital bed, shower chair, w/c and RW )  Health Maintenance Reviewed: Yes  Speech Therapy Consult: Yes  Support Systems: Child(liya), Caregiver/Home Care Staff  Confirm Follow Up Transport:  (BLS Transport )  Discharge Location  Discharge Placement: Home with home health  Trent Jones RN BSN CCM

## 2021-10-15 NOTE — CALL BACK NOTE
Per patient's daughter, patient has difficulty swallowing. Patient is NPO. Speech evaluation is requested.

## 2021-10-15 NOTE — PROGRESS NOTES
Bedside and Verbal shift change report given to Janessa Alonso (oncoming nurse) by Chad Mejia (offgoing nurse). Report included the following information SBAR, Kardex, Procedure Summary, Intake/Output, Recent Results, Med Rec Status, Cardiac Rhythm Sinus Tach, Alarm Parameters , Quality Measures and Dual Neuro Assessment    1113- attempted to collect Troponin level. Pt declined to let nurse collect. Pt's daughter is at the besides. It was discussed that nurse will try again at a later time. 1830- soft BP. HR maintained in 70-80's. Tiltrated Cardizem down. [facility] at the bedside. DNR signed by daughter. [phone]- Notified RT in regards to pt's increased efforts to breathe. Bedside and Verbal shift change report given to Chad Mejia (oncoming nurse) by Janessa Alonso (offgoing nurse). Report included the following information SBAR, Kardex, Procedure Summary, Intake/Output, MAR, Recent Results, Pre Procedure Checklist, Procedure Verification, Quality Measures and Dual Neuro Assessment.

## 2021-10-15 NOTE — PROGRESS NOTES
1930  Bedside and Verbal shift change report given to Liza Man (oncoming nurse) by Samantha RN (offgoing nurse). Report included the following information SBAR, Kardex and Cardiac Rhythm Afib. Pt still on dilt gtt overnight at 15mg/hr. Unable to wean as HR still labile between low 100s and 120s intermittently. Troponin 23    Notified hospitalist of pt difficulty with swallowing and aspiration pneumonia diagnosis. Speech consult placed and pt NPO. Pt had mild fever at 100.1. PRN tylenol given. Repeat 98.2    0800  Bedside and Verbal shift change report given to [address] (oncoming nurse) by Liza Man (offgoing nurse). Report included the following information SBAR, Kardex and Cardiac Rhythm AFib.

## 2021-10-15 NOTE — PROGRESS NOTES
Problem: Dysphagia (Adult)  Goal: *Acute Goals and Plan of Care (Insert Text)  Description: Speech Therapy Goals  Initiated 10/15/2021  1. Patient will tolerate pureed diet, moderately thickened liquids without adverse response and free of s/s aspiration within 7 days. Outcome: Not Met     SPEECH LANGUAGE PATHOLOGY BEDSIDE SWALLOW EVALUATION  Patient: David Riley (3 y.o. female)  Date: 10/15/2021  Primary Diagnosis: Atrial fibrillation ([facility] [address] [I48.91]        Precautions: Aspiration       ASSESSMENT :  Based on the objective data described below, the patient presents with moderate oropharyngeal dysphagia characterized by slow oral manipulation and oral transit with suspected pharyngeal swallow delay. Per chart review patient completed MBS 2/2021 and was placed on mechanical soft diet with mildly thickened liquids. Per daughter at bedside patient seen by SLP for home health treatment and most recently on pureed diet, moderately thickened liquids. At bedside this date patient tolerated PO trials without overt s/s aspiration and with stable vital signs. Given bedside presentation feel patient is safe to continue baseline diet. SLP can complete repeat MBS for reassessment of swallow physiology if MD wishes. Patient will benefit from skilled intervention to address the above impairments. Patients rehabilitation potential is considered to be Fair     PLAN :  Recommendations and Planned Interventions:  Pureed diet  Moderately thickened liquids  Sit up for all PO  Small bites   Single sips  Medication as tolerated in puree  Frequency/Duration: Patient will be followed by speech-language pathology 3 times a week to address goals. Discharge Recommendations: To Be Determined     SUBJECTIVE:   Patient stated Amarjit Russell. Patient's daughter at bedside reports baseline diet is pureed with moderately thickened liquids.     OBJECTIVE:     Past Medical History:   Diagnosis Date    Alcoholism ([facility] [address] 11/10/2011 Arthritis     COPD (chronic obstructive pulmonary disease) ([facility] [address] 2/18/2021    Dementia     Dementia ([facility] [address] 11/10/2011    Depression 11/10/2011    Diverticulosis 1/27/2014    HTN (hypertension) 6/18/2012    Hyperlipidemia 6/24/2012    Hypertension     Hypovitaminosis D 12/17/2013    Intraventricular hemorrhage, nontraumatic ([facility] [address] 6/18/2012    Mass of pancreas 1/27/2014    1.5 CM DENSITY POST HEAD OF PANCREAS ON CT 1/27/2014    Osteoporosis 12/16/2013    Other ill-defined conditions(799.89)     broken right shoulder    Stroke [facility])      Past Surgical History:   Procedure Laterality Date    HX APPENDECTOMY      HX CATARACT REMOVAL      HX CHOLECYSTECTOMY      HX HYSTERECTOMY      HX ORTHOPAEDIC      repair lt shoulder fx     Prior Level of Function/Home Situation:      Diet prior to admission: Pureed, moderately thickened liquids  Current Diet:  NPO   Cognitive and Communication Status:  Neurologic State: Alert, Drowsy, Eyes open to voice  Orientation Level: Disoriented to place, Disoriented to situation, Oriented to person  Cognition: Follows commands  Perception: Appears intact  Perseveration: No perseveration noted  Safety/Judgement: Decreased awareness of environment  Oral Assessment:  Oral Assessment  Labial: No impairment  Dentition: Intact; Natural  Oral Hygiene: Moist oral mucosa  Lingual: Decreased rate  Velum: Unable to visualize  Mandible: No impairment  P.O.  Trials:  Patient Position: Upright in bed  Vocal quality prior to P.O.: No impairment  Consistency Presented: Honey thick liquid;Puree  How Presented: Self-fed/presented;SLP-fed/presented;Cup/sip;Spoon     Bolus Acceptance: No impairment  Bolus Formation/Control: Impaired  Type of Impairment: Delayed  Propulsion: Delayed (# of seconds)  Oral Residue: None        Aspiration Signs/Symptoms: None                Oral Phase Severity: Moderate  Pharyngeal Phase Severity : Moderate    NOMS:   The NOMS functional outcome measure was used to quantify this patient's level of swallowing impairment. Based on the NOMS, the patient was determined to be at level 3 for swallow function       NOMS Swallowing Levels:  Level 1 (CN): NPO  Level 2 (CM): NPO but takes consistency in therapy  Level 3 (CL): Takes less than 50% of nutrition p.o. and continues with nonoral feedings; and/or safe with mod cues; and/or max diet restriction  Level 4 (CK): Safe swallow but needs mod cues; and/or mod diet restriction; and/or still requires some nonoral feeding/supplements  Level 5 (CJ): Safe swallow with min diet restriction; and/or needs min cues  Level 6 (CI): Independent with p.o.; rare cues; usually self cues; may need to avoid some foods or needs extra time  Level 7 ([address] [place] [address]): Independent for all p.o.  JORY. (2003). National Outcomes Measurement System (NOMS): Adult Speech-Language Pathology User's Guide. After treatment:   Patient left in no apparent distress in bed, Call bell within reach, Nursing notified, and Caregiver / family present    COMMUNICATION/EDUCATION:   Patient was educated regarding role of SLP, diet and POC. Patient closed her eyes. Patient's daughter asking appropriate questions and indicated understanding. The patient's plan of care including recommendations, planned interventions, and recommended diet changes were discussed with: Registered nurse. Patient/family have participated as able in goal setting and plan of care. Patient/family agree to work toward stated goals and plan of care.     Thank you for this referral.  Josué Colon, SLP  Time Calculation: 18 mins

## 2021-10-15 NOTE — ACP (ADVANCE CARE PLANNING)
Advance Care Planning     Advance Care Planning (ACP) Physician/NP/PA Conversation      Date of Conversation: 10/14/2021  Conducted with: Healthcare Decision Maker: Named in Advance Directive or Healthcare Power of  [place]:   No healthcare decision makers have been documented. Click here to complete [address] including selection of the Healthcare Decision Maker Relationship (ie \"Primary\")    Daughter Srinath Moser [phone]    Care Preferences:    Hospitalization: \"If your health worsens and it becomes clear that your chance of recovery is unlikely, what would be your preference regarding hospitalization? \"  The patient would prefer hospitalization. Ventilation: \"If you were unable to breathe on your own and your chance of recovery was unlikely, what would be your preference about the use of a ventilator (breathing machine) if it was available to you? \"   The patient would desire the use of a ventilator. Resuscitation: \"In the event your heart stopped as a result of an underlying serious health condition, would you want attempts to be made to restart your heart, or would you prefer a natural death? \"   No, do NOT attempt to resuscitate.     Additional topics discussed: ventilation preferences, hospitalization preferences and resuscitation preferences    Conversation Outcomes / Follow-Up Plan:   ACP complete - no further action today  Reviewed DNR/DNI and patient elects DNR order - completed portable DNR form & placed order     Length of Voluntary ACP Conversation in minutes:  20 minutes    Virginie Rosales NP

## 2021-10-16 ENCOUNTER — APPOINTMENT (OUTPATIENT)
Dept: NON INVASIVE DIAGNOSTICS | Age: 86
DRG: 177 | End: 2021-10-16
Attending: FAMILY MEDICINE
Payer: MEDICARE

## 2021-10-16 LAB
ECHO AV MEAN GRADIENT: 5.02 MMHG
ECHO AV PEAK GRADIENT: 10.81 MMHG
ECHO AV PEAK VELOCITY: 164.39 CM/S
ECHO AV VTI: 27.64 CM
ECHO EST RA PRESSURE: 3 MMHG
ECHO LA MAJOR AXIS: 5.02 CM
ECHO LA MINOR AXIS: 3.02 CM
ECHO LV E' LATERAL VELOCITY: 10.04 CM/S
ECHO LV E' SEPTAL VELOCITY: 7.3 CM/S
ECHO LV INTERNAL DIMENSION DIASTOLIC: 3.72 CM (ref 3.9–5.3)
ECHO LV INTERNAL DIMENSION SYSTOLIC: 2.52 CM
ECHO LV IVSD: 1.12 CM (ref 0.6–0.9)
ECHO LV MASS 2D: 121.8 G (ref 67–162)
ECHO LV MASS INDEX 2D: 73.4 G/M2 (ref 43–95)
ECHO LV POSTERIOR WALL DIASTOLIC: 0.98 CM (ref 0.6–0.9)
ECHO LVOT PEAK GRADIENT: 3.81 MMHG
ECHO LVOT PEAK VELOCITY: 97.6 CM/S
ECHO LVOT VTI: 14.84 CM
ECHO MV A VELOCITY: 63.32 CM/S
ECHO MV AREA PHT: 7.19 CM2
ECHO MV E DECELERATION TIME (DT): 105.55 MS
ECHO MV E VELOCITY: 160.12 CM/S
ECHO MV E/A RATIO: 2.53
ECHO MV E/E' LATERAL: 15.95
ECHO MV E/E' RATIO (AVERAGED): 18.94
ECHO MV E/E' SEPTAL: 21.93
ECHO MV MAX VELOCITY: 183.56 CM/S
ECHO MV MEAN GRADIENT: 4.82 MMHG
ECHO MV PEAK GRADIENT: 13.48 MMHG
ECHO MV PRESSURE HALF TIME (PHT): 30.61 MS
ECHO MV REGURGITANT VTIA: 143.49 CM
ECHO MV VTI: 32.81 CM
ECHO PV MAX VELOCITY: 79.09 CM/S
ECHO PV PEAK INSTANTANEOUS GRADIENT SYSTOLIC: 2.5 MMHG
ECHO PV REGURGITANT MAX VELOCITY: 132.37 CM/S
ECHO RIGHT VENTRICULAR SYSTOLIC PRESSURE (RVSP): 39.12 MMHG
ECHO RV INTERNAL DIMENSION: 2.8 CM
ECHO RV TAPSE: 1.3 CM (ref 1.5–2)
ECHO TV REGURGITANT MAX VELOCITY: 190.83 CM/S
ECHO TV REGURGITANT MAX VELOCITY: 300.5 CM/S
ECHO TV REGURGITANT PEAK GRADIENT: 36.12 MMHG

## 2021-10-16 PROCEDURE — 77030038269 HC DRN EXT URIN PURWCK BARD -A

## 2021-10-16 PROCEDURE — 93306 TTE W/DOPPLER COMPLETE: CPT | Performed by: INTERNAL MEDICINE

## 2021-10-16 PROCEDURE — 74011250636 HC RX REV CODE- 250/636: Performed by: INTERNAL MEDICINE

## 2021-10-16 PROCEDURE — 74011250637 HC RX REV CODE- 250/637: Performed by: FAMILY MEDICINE

## 2021-10-16 PROCEDURE — 74011250636 HC RX REV CODE- 250/636: Performed by: FAMILY MEDICINE

## 2021-10-16 PROCEDURE — 74011000250 HC RX REV CODE- 250: Performed by: FAMILY MEDICINE

## 2021-10-16 PROCEDURE — 74011000250 HC RX REV CODE- 250: Performed by: PHYSICIAN ASSISTANT

## 2021-10-16 PROCEDURE — 74011000258 HC RX REV CODE- 258: Performed by: EMERGENCY MEDICINE

## 2021-10-16 PROCEDURE — 93306 TTE W/DOPPLER COMPLETE: CPT

## 2021-10-16 PROCEDURE — 74011000250 HC RX REV CODE- 250: Performed by: EMERGENCY MEDICINE

## 2021-10-16 PROCEDURE — 99233 SBSQ HOSP IP/OBS HIGH 50: CPT | Performed by: INTERNAL MEDICINE

## 2021-10-16 PROCEDURE — 65660000000 HC RM CCU STEPDOWN

## 2021-10-16 RX ORDER — DIGOXIN 0.25 MG/ML
125 INJECTION INTRAMUSCULAR; INTRAVENOUS DAILY
Status: DISCONTINUED | OUTPATIENT
Start: 2021-10-16 | End: 2021-10-17

## 2021-10-16 RX ORDER — IPRATROPIUM BROMIDE AND ALBUTEROL SULFATE 2.5; .5 MG/3ML; MG/3ML
3 SOLUTION RESPIRATORY (INHALATION)
Status: DISCONTINUED | OUTPATIENT
Start: 2021-10-16 | End: 2021-10-19

## 2021-10-16 RX ADMIN — Medication 10 ML: at 07:02

## 2021-10-16 RX ADMIN — METRONIDAZOLE 500 MG: 500 INJECTION, SOLUTION INTRAVENOUS at 15:00

## 2021-10-16 RX ADMIN — DIGOXIN 125 MCG: 0.25 INJECTION INTRAMUSCULAR; INTRAVENOUS at 10:19

## 2021-10-16 RX ADMIN — METOPROLOL TARTRATE 5 MG: 5 INJECTION INTRAVENOUS at 06:30

## 2021-10-16 RX ADMIN — WATER 1 G: 1 INJECTION INTRAMUSCULAR; INTRAVENOUS; SUBCUTANEOUS at 19:00

## 2021-10-16 RX ADMIN — METOPROLOL TARTRATE 5 MG: 5 INJECTION INTRAVENOUS at 23:15

## 2021-10-16 RX ADMIN — ACETAMINOPHEN 650 MG: 325 TABLET ORAL at 23:44

## 2021-10-16 RX ADMIN — SODIUM CHLORIDE 125 ML/HR: 9 INJECTION, SOLUTION INTRAVENOUS at 11:43

## 2021-10-16 RX ADMIN — ESCITALOPRAM OXALATE 10 MG: 10 TABLET ORAL at 18:00

## 2021-10-16 RX ADMIN — AZITHROMYCIN MONOHYDRATE 500 MG: 500 INJECTION, POWDER, LYOPHILIZED, FOR SOLUTION INTRAVENOUS at 00:56

## 2021-10-16 RX ADMIN — Medication 10 ML: at 23:14

## 2021-10-16 RX ADMIN — DILTIAZEM HYDROCHLORIDE 7.5 MG/HR: 5 INJECTION, SOLUTION INTRAVENOUS at 00:55

## 2021-10-16 RX ADMIN — METRONIDAZOLE 500 MG: 500 INJECTION, SOLUTION INTRAVENOUS at 06:23

## 2021-10-16 RX ADMIN — METOPROLOL TARTRATE 5 MG: 5 INJECTION INTRAVENOUS at 12:00

## 2021-10-16 RX ADMIN — METOPROLOL TARTRATE 5 MG: 5 INJECTION INTRAVENOUS at 17:29

## 2021-10-16 RX ADMIN — METRONIDAZOLE 500 MG: 500 INJECTION, SOLUTION INTRAVENOUS at 23:16

## 2021-10-16 NOTE — PROGRESS NOTES
1930 Bedside and Verbal shift change report given to Julia Lopez (oncoming nurse) by Robinson De León (offgoing nurse). Report included the following information SBAR, Kardex and Cardiac Rhythm AFIB. Pt remains on Dilt gtt at 7.5mg/her. HR 90s-low 100s    2200: Pt developed a mild fever of 100.2. Tylenol given. Repeat temp 98.2    0400: CHG bath    0800: Bedside and Verbal shift change report given to Davida Hart (oncoming nurse) by Julia Lopez (offgoing nurse). Report included the following information SBAR, Kardex and Cardiac Rhythm AFIB.
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
## 2021-10-16 NOTE — PROGRESS NOTES
[address] [facility] Adult  Hospitalist Group                                                                                          Hospitalist Progress Note  Robe Roman MD  Answering service: [phone] from in house phone        Date of Service:  10/16/2021  NAME:  Wilbert Pearce  :  3/16/1931  MRN:  [id number]      Admission Summary:   Copied form admit: \" Wilbert Pearce is a 80 y.o. female who presents with shortness of breath     Patient presented to the hospital with hypoxia, apparently patient has home health and they noticed that the patient's oxygen saturation has been running low, it was running in the 80s, patient was brought to the hospital.  Patient is a poor historian, has history of dementia, so not much history could be obtained from the patient, patient is complaining of some abdominal pain, when patient arrived to the hospital was found to be in A. fib with RVR, was requested to be admitted to the hospitalist service. Patient currently appears somewhat uncomfortable. I tried calling patient's daughter Nell Lorenzo, and left a voicemail \"    Interval history / Subjective:     10/16/2021 :    DNR in place   Cont on po intake efforts, pt not able to manage on herself       Assessment & Plan:     A. fib with RVR- diltz drip, po replacement as feel pt can take; Else iv dig to po dig   - meds adjusted by card; see MAR    Acute hypoxic respiratory failure - 02 /nebs/monitor   - no acute resp distress on exam     Community-acquired pneumonia- aspiration:  CT 10/14:  1. No pulmonary embolus. 2.  Pulmonary edema pattern with moderate right and small left pleural  effusions. 3.  Debris/secretions in the central airways, increased risk for aspiration  Pneumonitis.     Hypertension  Adjust meds as needed  BP Readings from Last 1 Encounters:   10/16/21 136/89      CoDE status; DNR     Hyperlipidemia    Dysphagia: speech eval  - done, puree/honey thickness liquid Hospital Problems  Date Reviewed: 2/12/2021        Codes Class Noted POA    Atrial fibrillation [facility]) ICD-10-CM: I48.91  ICD-9-CM: 427.31  10/14/2021 Unknown                  After personally interviewing pt at bedside the following is noted on 10/16/2021 :    Review of Systems   Reason unable to perform ROS: dementia               I had a face to face encounter with patient on 10/16/2021 at bedside for the following physical exam:     PHYSICAL EXAM:    Visit Vitals  /89   Pulse (!) 108   Temp 98.4 °F (36.9 °C)   Resp 18   Wt 65.8 kg (145 lb)   SpO2 97%   BMI 26.52 kg/m²          Physical Exam  Constitutional:       General: She is not in acute distress. Appearance: She is obese. She is not ill-appearing. Comments: Marked debility, poor in bed mobility    HENT:      Head: Normocephalic and atraumatic. Right Ear: External ear normal.      Left Ear: External ear normal.      Mouth/Throat:      Mouth: Mucous membranes are dry. Pharynx: Oropharynx is clear. Eyes:      Extraocular Movements: Extraocular movements intact. Conjunctiva/sclera: Conjunctivae normal.      Pupils: Pupils are equal, round, and reactive to light. Cardiovascular:      Rate and Rhythm: Tachycardia present. Rhythm irregular. Pulmonary:      Comments: Coarse no wheezing   Abdominal:      General: Abdomen is flat. Bowel sounds are normal.      Palpations: Abdomen is soft. Musculoskeletal:         General: No swelling or deformity. Skin:     General: Skin is warm and dry. Neurological:      Mental Status: Mental status is at baseline.       Comments: Near baseline    Psychiatric:         Mood and Affect: Mood normal.         Behavior: Behavior normal.      Comments: Not agitated not anxious demented                      Data Review:    Review and/or order of clinical lab test      Labs:     Recent Labs     10/15/21  0539 10/14/21  1115   WBC 8.4 11.9*   HGB 9.4* 10.7*   HCT 31.3* 34.9*    231 Recent Labs     10/15/21  0539 10/14/21  1115    136   K 3.8 3.9   * 108   CO2 22 25   BUN 14 19   CREA 0.90 1.04*   * 128*   CA 8.6 9.1   MG  --  2.4     Recent Labs     10/14/21  1115   ALT 29   *   TBILI 0.5   TP 7.8   ALB 3.0*   GLOB 4.8*   LPSE 104     No results for input(s): INR, PTP, APTT, INREXT, INREXT in the last 72 hours. No results for input(s): FE, TIBC, PSAT, FERR in the last 72 hours. No results found for: FOL, RBCF   No results for input(s): PH, PCO2, PO2 in the last 72 hours. No results for input(s): CPK, CKNDX, TROIQ in the last 72 hours.     No lab exists for component: CPKMB  Lab Results   Component Value Date/Time    Cholesterol, total 190 06/24/2012 05:15 AM    HDL Cholesterol 30 06/24/2012 05:15 AM    LDL, calculated 130.4 (H) 06/24/2012 05:15 AM    Triglyceride 148 06/24/2012 05:15 AM    CHOL/HDL Ratio 6.3 (H) 06/24/2012 05:15 AM     Lab Results   Component Value Date/Time    Glucose (POC) 107 10/15/2021 11:16 AM    Glucose (POC) 154 (H) 02/16/2021 11:13 AM    Glucose (POC) 116 (H) 06/18/2012 01:30 PM     Lab Results   Component Value Date/Time    Color YELLOW/STRAW 02/10/2021 03:33 PM    Appearance CLEAR 02/10/2021 03:33 PM    Specific gravity 1.019 02/10/2021 03:33 PM    pH (UA) 5.0 02/10/2021 03:33 PM    Protein TRACE (A) 02/10/2021 03:33 PM    Glucose Negative 02/10/2021 03:33 PM    Ketone Negative 02/10/2021 03:33 PM    Bilirubin Negative 02/10/2021 03:33 PM    Urobilinogen 0.2 02/10/2021 03:33 PM    Nitrites Negative 02/10/2021 03:33 PM    Leukocyte Esterase TRACE (A) 02/10/2021 03:33 PM    Epithelial cells FEW 02/10/2021 03:33 PM    Bacteria Negative 02/10/2021 03:33 PM    WBC 5-10 02/10/2021 03:33 PM    RBC 0-5 02/10/2021 03:33 PM         Medications Reviewed:     Current Facility-Administered Medications   Medication Dose Route Frequency    digoxin (LANOXIN) injection 125 mcg  125 mcg IntraVENous DAILY    albuterol-ipratropium (DUO-NEB) 2.5 MG-0.5 MG/3 ML  3 mL Nebulization Q4H PRN    metroNIDAZOLE (FLAGYL) IVPB premix 500 mg  500 mg IntraVENous Q8H    dilTIAZem (CARDIZEM) 125 mg in dextrose 5% 125 mL infusion  0-15 mg/hr IntraVENous TITRATE    [Held by provider] amLODIPine (NORVASC) tablet 5 mg  5 mg Oral DAILY    escitalopram oxalate (LEXAPRO) tablet 10 mg  10 mg Oral QPM    sodium chloride (NS) flush 5-40 mL  5-40 mL IntraVENous Q8H    sodium chloride (NS) flush 5-40 mL  5-40 mL IntraVENous PRN    0.9% sodium chloride infusion  125 mL/hr IntraVENous CONTINUOUS    acetaminophen (TYLENOL) tablet 650 mg  650 mg Oral Q4H PRN    cefTRIAXone (ROCEPHIN) 1 g in sterile water (preservative free) 10 mL IV syringe  1 g IntraVENous Q24H    azithromycin (ZITHROMAX) 500 mg in 0.9% sodium chloride 250 mL (VIAL-MATE)  500 mg IntraVENous Q24H    metoprolol (LOPRESSOR) injection 5 mg  5 mg IntraVENous Q6H     ______________________________________________________________________  EXPECTED LENGTH OF STAY: 3d 12h  ACTUAL LENGTH OF STAY:          2                 Diego Montes MD

## 2021-10-17 ENCOUNTER — APPOINTMENT (OUTPATIENT)
Dept: GENERAL RADIOLOGY | Age: 86
DRG: 177 | End: 2021-10-17
Attending: INTERNAL MEDICINE
Payer: MEDICARE

## 2021-10-17 LAB
ALBUMIN SERPL-MCNC: 2.3 G/DL (ref 3.5–5)
ALBUMIN/GLOB SERPL: 0.6 {RATIO} (ref 1.1–2.2)
ALP SERPL-CCNC: 109 U/L (ref 45–117)
ALT SERPL-CCNC: 16 U/L (ref 12–78)
ANION GAP SERPL CALC-SCNC: 4 MMOL/L (ref 5–15)
ARTERIAL PATENCY WRIST A: POSITIVE
AST SERPL-CCNC: 11 U/L (ref 15–37)
BASE DEFICIT BLD-SCNC: 3.3 MMOL/L
BASOPHILS # BLD: 0.1 K/UL (ref 0–0.1)
BASOPHILS NFR BLD: 1 % (ref 0–1)
BDY SITE: ABNORMAL
BILIRUB SERPL-MCNC: 0.3 MG/DL (ref 0.2–1)
BUN SERPL-MCNC: 12 MG/DL (ref 6–20)
BUN/CREAT SERPL: 16 (ref 12–20)
CALCIUM SERPL-MCNC: 7.9 MG/DL (ref 8.5–10.1)
CHLORIDE SERPL-SCNC: 117 MMOL/L (ref 97–108)
CO2 SERPL-SCNC: 21 MMOL/L (ref 21–32)
CREAT SERPL-MCNC: 0.75 MG/DL (ref 0.55–1.02)
DIFFERENTIAL METHOD BLD: ABNORMAL
EOSINOPHIL # BLD: 0.1 K/UL (ref 0–0.4)
EOSINOPHIL NFR BLD: 1 % (ref 0–7)
ERYTHROCYTE [DISTWIDTH] IN BLOOD BY AUTOMATED COUNT: 16.1 % (ref 11.5–14.5)
GAS FLOW.O2 O2 DELIVERY SYS: ABNORMAL L/MIN
GLOBULIN SER CALC-MCNC: 4.1 G/DL (ref 2–4)
GLUCOSE SERPL-MCNC: 117 MG/DL (ref 65–100)
HCO3 BLD-SCNC: 20.6 MMOL/L (ref 22–26)
HCT VFR BLD AUTO: 34.2 % (ref 35–47)
HGB BLD-MCNC: 10.4 G/DL (ref 11.5–16)
IMM GRANULOCYTES # BLD AUTO: 0.1 K/UL (ref 0–0.04)
IMM GRANULOCYTES NFR BLD AUTO: 1 % (ref 0–0.5)
LACTATE SERPL-SCNC: 0.6 MMOL/L (ref 0.4–2)
LYMPHOCYTES # BLD: 0.6 K/UL (ref 0.8–3.5)
LYMPHOCYTES NFR BLD: 8 % (ref 12–49)
MAGNESIUM SERPL-MCNC: 1.8 MG/DL (ref 1.6–2.4)
MCH RBC QN AUTO: 27.4 PG (ref 26–34)
MCHC RBC AUTO-ENTMCNC: 30.4 G/DL (ref 30–36.5)
MCV RBC AUTO: 90.2 FL (ref 80–99)
MONOCYTES # BLD: 0.4 K/UL (ref 0–1)
MONOCYTES NFR BLD: 6 % (ref 5–13)
NEUTS SEG # BLD: 5.9 K/UL (ref 1.8–8)
NEUTS SEG NFR BLD: 83 % (ref 32–75)
NRBC # BLD: 0 K/UL (ref 0–0.01)
NRBC BLD-RTO: 0 PER 100 WBC
O2/TOTAL GAS SETTING VFR VENT: 4 %
PCO2 BLD: 32.2 MMHG (ref 35–45)
PH BLD: 7.41 [PH] (ref 7.35–7.45)
PLATELET # BLD AUTO: 236 K/UL (ref 150–400)
PMV BLD AUTO: 10.4 FL (ref 8.9–12.9)
PO2 BLD: 91 MMHG (ref 80–100)
POTASSIUM SERPL-SCNC: 3.2 MMOL/L (ref 3.5–5.1)
PROT SERPL-MCNC: 6.4 G/DL (ref 6.4–8.2)
RBC # BLD AUTO: 3.79 M/UL (ref 3.8–5.2)
RBC MORPH BLD: ABNORMAL
SAO2 % BLD: 97.3 % (ref 92–97)
SODIUM SERPL-SCNC: 142 MMOL/L (ref 136–145)
SPECIMEN TYPE: ABNORMAL
WBC # BLD AUTO: 7.2 K/UL (ref 3.6–11)

## 2021-10-17 PROCEDURE — 77010033678 HC OXYGEN DAILY

## 2021-10-17 PROCEDURE — 74011250636 HC RX REV CODE- 250/636: Performed by: INTERNAL MEDICINE

## 2021-10-17 PROCEDURE — 80053 COMPREHEN METABOLIC PANEL: CPT

## 2021-10-17 PROCEDURE — 74011000250 HC RX REV CODE- 250: Performed by: EMERGENCY MEDICINE

## 2021-10-17 PROCEDURE — 83735 ASSAY OF MAGNESIUM: CPT

## 2021-10-17 PROCEDURE — 74011250636 HC RX REV CODE- 250/636: Performed by: FAMILY MEDICINE

## 2021-10-17 PROCEDURE — 36600 WITHDRAWAL OF ARTERIAL BLOOD: CPT

## 2021-10-17 PROCEDURE — 74011000250 HC RX REV CODE- 250: Performed by: FAMILY MEDICINE

## 2021-10-17 PROCEDURE — 85025 COMPLETE CBC W/AUTO DIFF WBC: CPT

## 2021-10-17 PROCEDURE — 36415 COLL VENOUS BLD VENIPUNCTURE: CPT

## 2021-10-17 PROCEDURE — 82803 BLOOD GASES ANY COMBINATION: CPT

## 2021-10-17 PROCEDURE — 74011250637 HC RX REV CODE- 250/637: Performed by: INTERNAL MEDICINE

## 2021-10-17 PROCEDURE — 74011000250 HC RX REV CODE- 250: Performed by: PHYSICIAN ASSISTANT

## 2021-10-17 PROCEDURE — 71045 X-RAY EXAM CHEST 1 VIEW: CPT

## 2021-10-17 PROCEDURE — 99233 SBSQ HOSP IP/OBS HIGH 50: CPT | Performed by: INTERNAL MEDICINE

## 2021-10-17 PROCEDURE — 74011000258 HC RX REV CODE- 258: Performed by: EMERGENCY MEDICINE

## 2021-10-17 PROCEDURE — 83605 ASSAY OF LACTIC ACID: CPT

## 2021-10-17 PROCEDURE — 65660000000 HC RM CCU STEPDOWN

## 2021-10-17 PROCEDURE — 74011250637 HC RX REV CODE- 250/637: Performed by: FAMILY MEDICINE

## 2021-10-17 PROCEDURE — 74011000250 HC RX REV CODE- 250: Performed by: INTERNAL MEDICINE

## 2021-10-17 RX ORDER — METOPROLOL TARTRATE 5 MG/5ML
5 INJECTION INTRAVENOUS
Status: DISCONTINUED | OUTPATIENT
Start: 2021-10-17 | End: 2021-10-19

## 2021-10-17 RX ORDER — METOPROLOL TARTRATE 25 MG/1
25 TABLET, FILM COATED ORAL 2 TIMES DAILY
Status: DISCONTINUED | OUTPATIENT
Start: 2021-10-17 | End: 2021-10-18

## 2021-10-17 RX ORDER — DILTIAZEM HYDROCHLORIDE 30 MG/1
60 TABLET, FILM COATED ORAL
Status: DISCONTINUED | OUTPATIENT
Start: 2021-10-17 | End: 2021-10-19

## 2021-10-17 RX ORDER — FUROSEMIDE 10 MG/ML
40 INJECTION INTRAMUSCULAR; INTRAVENOUS 2 TIMES DAILY
Status: COMPLETED | OUTPATIENT
Start: 2021-10-17 | End: 2021-10-18

## 2021-10-17 RX ADMIN — DILTIAZEM HYDROCHLORIDE 60 MG: 30 TABLET, FILM COATED ORAL at 09:07

## 2021-10-17 RX ADMIN — ACETAMINOPHEN 650 MG: 325 TABLET ORAL at 11:00

## 2021-10-17 RX ADMIN — METOPROLOL TARTRATE 25 MG: 25 TABLET, FILM COATED ORAL at 21:58

## 2021-10-17 RX ADMIN — AZITHROMYCIN MONOHYDRATE 500 MG: 500 INJECTION, POWDER, LYOPHILIZED, FOR SOLUTION INTRAVENOUS at 00:28

## 2021-10-17 RX ADMIN — DILTIAZEM HYDROCHLORIDE 60 MG: 30 TABLET, FILM COATED ORAL at 11:30

## 2021-10-17 RX ADMIN — WATER 1 G: 1 INJECTION INTRAMUSCULAR; INTRAVENOUS; SUBCUTANEOUS at 18:30

## 2021-10-17 RX ADMIN — DILTIAZEM HYDROCHLORIDE 60 MG: 30 TABLET, FILM COATED ORAL at 21:58

## 2021-10-17 RX ADMIN — Medication 10 ML: at 06:13

## 2021-10-17 RX ADMIN — METOPROLOL TARTRATE 5 MG: 5 INJECTION INTRAVENOUS at 22:16

## 2021-10-17 RX ADMIN — METOPROLOL TARTRATE 5 MG: 5 INJECTION INTRAVENOUS at 15:19

## 2021-10-17 RX ADMIN — DILTIAZEM HYDROCHLORIDE 60 MG: 30 TABLET, FILM COATED ORAL at 15:27

## 2021-10-17 RX ADMIN — AZITHROMYCIN MONOHYDRATE 500 MG: 500 INJECTION, POWDER, LYOPHILIZED, FOR SOLUTION INTRAVENOUS at 23:51

## 2021-10-17 RX ADMIN — DILTIAZEM HYDROCHLORIDE 15 MG/HR: 5 INJECTION, SOLUTION INTRAVENOUS at 01:29

## 2021-10-17 RX ADMIN — METOPROLOL TARTRATE 25 MG: 25 TABLET, FILM COATED ORAL at 15:19

## 2021-10-17 RX ADMIN — METRONIDAZOLE 500 MG: 500 INJECTION, SOLUTION INTRAVENOUS at 15:19

## 2021-10-17 RX ADMIN — METRONIDAZOLE 500 MG: 500 INJECTION, SOLUTION INTRAVENOUS at 22:13

## 2021-10-17 RX ADMIN — ESCITALOPRAM OXALATE 10 MG: 10 TABLET ORAL at 18:00

## 2021-10-17 RX ADMIN — SODIUM CHLORIDE 125 ML/HR: 9 INJECTION, SOLUTION INTRAVENOUS at 04:54

## 2021-10-17 RX ADMIN — METRONIDAZOLE 500 MG: 500 INJECTION, SOLUTION INTRAVENOUS at 07:09

## 2021-10-17 RX ADMIN — Medication 10 ML: at 22:30

## 2021-10-17 RX ADMIN — METOPROLOL TARTRATE 5 MG: 5 INJECTION INTRAVENOUS at 06:13

## 2021-10-17 RX ADMIN — POTASSIUM BICARBONATE 40 MEQ: 391 TABLET, EFFERVESCENT ORAL at 18:00

## 2021-10-17 RX ADMIN — FUROSEMIDE 40 MG: 10 INJECTION, SOLUTION INTRAMUSCULAR; INTRAVENOUS at 18:00

## 2021-10-17 NOTE — PROGRESS NOTES
[address] [facility] Adult  Hospitalist Group                                                                                          Hospitalist Progress Note  Ketan Elizabeth MD  Answering service: [phone] from in house phone        Date of Service:  10/17/2021  NAME:  Noni Bamberger  :  3/16/1931  MRN:  [id number]      Admission Summary:   Copied form admit: \" Noni Bamberger is a 80 y.o. female who presents with shortness of breath     Patient presented to the hospital with hypoxia, apparently patient has home health and they noticed that the patient's oxygen saturation has been running low, it was running in the 80s, patient was brought to the hospital.  Patient is a poor historian, has history of dementia, so not much history could be obtained from the patient, patient is complaining of some abdominal pain, when patient arrived to the hospital was found to be in A. fib with RVR, was requested to be admitted to the hospitalist service. Patient currently appears somewhat uncomfortable. I tried calling patient's daughter Behzad Gross, and left a voicemail \"    Interval history / Subjective:     10/17/2021 :    No acute distress   Yet mildly tachy/afib       Assessment & Plan:     A. fib with RVR- diltz drip, po replacement as feel pt can take; Else iv dig to po dig   - meds adjusted by card; see MAR    Acute hypoxic respiratory failure - 02 /nebs/monitor   - no acute resp distress on exam     Community-acquired pneumonia- aspiration:  CT 10/14:  1. No pulmonary embolus. 2.  Pulmonary edema pattern with moderate right and small left pleural  effusions. 3.  Debris/secretions in the central airways, increased risk for aspiration  Pneumonitis.     Hypertension  Adjust meds as needed  BP Readings from Last 1 Encounters:   10/17/21 (!) 148/88      CoDE status; DNR     Hypokalemia: replacing     Hyperlipidemia    Dysphagia: speech eval  - done, puree/honey thickness liquid Hospital Problems  Date Reviewed: 2/12/2021        Codes Class Noted POA    Atrial fibrillation [facility]) ICD-10-CM: I48.91  ICD-9-CM: 427.31  10/14/2021 Unknown                  After personally interviewing pt at bedside the following is noted on 10/17/2021 :    Review of Systems   Unable to perform ROS: Critical illness (letharthy/ sleepy )              I had a face to face encounter with patient on 10/17/2021 at bedside for the following physical exam:     PHYSICAL EXAM:    Visit Vitals  BP (!) 148/88   Pulse (!) 102   Temp 99.7 °F (37.6 °C)   Resp 22   Ht 5' 2\" (1.575 m)   Wt 66.1 kg (145 lb 12.8 oz)   SpO2 92%   BMI 26.67 kg/m²          Physical Exam  Constitutional:       General: She is not in acute distress. Appearance: She is obese. She is not ill-appearing. Comments: Marked debility, poor in bed mobility    HENT:      Head: Normocephalic and atraumatic. Right Ear: External ear normal.      Left Ear: External ear normal.      Mouth/Throat:      Mouth: Mucous membranes are dry. Pharynx: Oropharynx is clear. Eyes:      Extraocular Movements: Extraocular movements intact. Conjunctiva/sclera: Conjunctivae normal.      Pupils: Pupils are equal, round, and reactive to light. Cardiovascular:      Rate and Rhythm: Tachycardia present. Rhythm irregular. Pulmonary:      Effort: Pulmonary effort is normal.      Breath sounds: Normal breath sounds. Comments: Coarse no wheezing   Abdominal:      General: Abdomen is flat. Bowel sounds are normal.      Palpations: Abdomen is soft. Musculoskeletal:         General: No swelling or deformity. Skin:     General: Skin is warm and dry. Neurological:      Mental Status: Mental status is at baseline.       Comments: Sleepy    Psychiatric:         Mood and Affect: Mood normal.         Behavior: Behavior normal.      Comments: Not agitated not anxious demented ; is sleepy                      Data Review:    Review and/or order of clinical lab test      Labs:     Recent Labs     10/17/21  0123 10/15/21  0539   WBC 7.2 8.4   HGB 10.4* 9.4*   HCT 34.2* 31.3*    247     Recent Labs     10/17/21  0123 10/15/21  0539    139   K 3.2* 3.8   * 111*   CO2 21 22   BUN 12 14   CREA 0.75 0.90   * 119*   CA 7.9* 8.6   MG 1.8  --      Recent Labs     10/17/21  0123   ALT 16      TBILI 0.3   TP 6.4   ALB 2.3*   GLOB 4.1*     No results for input(s): INR, PTP, APTT, INREXT, INREXT in the last 72 hours. No results for input(s): FE, TIBC, PSAT, FERR in the last 72 hours. No results found for: FOL, RBCF   No results for input(s): PH, PCO2, PO2 in the last 72 hours. No results for input(s): CPK, CKNDX, TROIQ in the last 72 hours.     No lab exists for component: CPKMB  Lab Results   Component Value Date/Time    Cholesterol, total 190 06/24/2012 05:15 AM    HDL Cholesterol 30 06/24/2012 05:15 AM    LDL, calculated 130.4 (H) 06/24/2012 05:15 AM    Triglyceride 148 06/24/2012 05:15 AM    CHOL/HDL Ratio 6.3 (H) 06/24/2012 05:15 AM     Lab Results   Component Value Date/Time    Glucose (POC) 107 10/15/2021 11:16 AM    Glucose (POC) 154 (H) 02/16/2021 11:13 AM    Glucose (POC) 116 (H) 06/18/2012 01:30 PM     Lab Results   Component Value Date/Time    Color YELLOW/STRAW 02/10/2021 03:33 PM    Appearance CLEAR 02/10/2021 03:33 PM    Specific gravity 1.019 02/10/2021 03:33 PM    pH (UA) 5.0 02/10/2021 03:33 PM    Protein TRACE (A) 02/10/2021 03:33 PM    Glucose Negative 02/10/2021 03:33 PM    Ketone Negative 02/10/2021 03:33 PM    Bilirubin Negative 02/10/2021 03:33 PM    Urobilinogen 0.2 02/10/2021 03:33 PM    Nitrites Negative 02/10/2021 03:33 PM    Leukocyte Esterase TRACE (A) 02/10/2021 03:33 PM    Epithelial cells FEW 02/10/2021 03:33 PM    Bacteria Negative 02/10/2021 03:33 PM    WBC 5-10 02/10/2021 03:33 PM    RBC 0-5 02/10/2021 03:33 PM         Medications Reviewed:     Current Facility-Administered Medications   Medication Dose Route Frequency    dilTIAZem IR (CARDIZEM) tablet 60 mg  60 mg Oral AC&HS    metoprolol (LOPRESSOR) injection 5 mg  5 mg IntraVENous Q6H PRN    metoprolol tartrate (LOPRESSOR) tablet 25 mg  25 mg Oral BID    furosemide (LASIX) injection 40 mg  40 mg IntraVENous BID    potassium bicarb-citric acid (EFFER-K) tablet 40 mEq  40 mEq Oral BID    albuterol-ipratropium (DUO-NEB) 2.5 MG-0.5 MG/3 ML  3 mL Nebulization Q4H PRN    metroNIDAZOLE (FLAGYL) IVPB premix 500 mg  500 mg IntraVENous Q8H    escitalopram oxalate (LEXAPRO) tablet 10 mg  10 mg Oral QPM    sodium chloride (NS) flush 5-40 mL  5-40 mL IntraVENous Q8H    sodium chloride (NS) flush 5-40 mL  5-40 mL IntraVENous PRN    acetaminophen (TYLENOL) tablet 650 mg  650 mg Oral Q4H PRN    cefTRIAXone (ROCEPHIN) 1 g in sterile water (preservative free) 10 mL IV syringe  1 g IntraVENous Q24H    azithromycin (ZITHROMAX) 500 mg in 0.9% sodium chloride 250 mL (VIAL-MATE)  500 mg IntraVENous Q24H     ______________________________________________________________________  EXPECTED LENGTH OF STAY: 3d 12h  ACTUAL LENGTH OF STAY:          20 [place], MD

## 2021-10-17 NOTE — PROGRESS NOTES
1930: Bedside and Verbal shift change report given to Julia Lopez (oncoming nurse) by Glenna Rivera RN (offgoing nurse). Report included the following information SBAR, Kardex and Cardiac Rhythm AFIB.     2300: Increased Dilt gtt to 15mg. BP elevated in the 218H to 103C systolic. HR low 100s to 120s.    0021: Notified Hospitalist of elevated BP, temp of 100.5 and increased work of breathing. ABG, CXR, CBC, BMP and Lactate ordered. PRN tylenol given. Lab results unremarkable. CXR shows moderate bilateral pulmonary edema and or pneumonia. 0400: BP normalized between 120s to 140s  and temp now 98.8. Bedside and Verbal shift change report given to [address] (oncoming nurse) by Julia Lopez (offgoing nurse). Report included the following information SBAR, Kardex and Cardiac Rhythm AFIB.
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
## 2021-10-18 LAB
ANION GAP SERPL CALC-SCNC: 4 MMOL/L (ref 5–15)
BASOPHILS # BLD: 0 K/UL (ref 0–0.1)
BASOPHILS NFR BLD: 1 % (ref 0–1)
BUN SERPL-MCNC: 12 MG/DL (ref 6–20)
BUN/CREAT SERPL: 14 (ref 12–20)
CALCIUM SERPL-MCNC: 8 MG/DL (ref 8.5–10.1)
CHLORIDE SERPL-SCNC: 113 MMOL/L (ref 97–108)
CO2 SERPL-SCNC: 25 MMOL/L (ref 21–32)
CREAT SERPL-MCNC: 0.84 MG/DL (ref 0.55–1.02)
DIFFERENTIAL METHOD BLD: ABNORMAL
EOSINOPHIL # BLD: 0 K/UL (ref 0–0.4)
EOSINOPHIL NFR BLD: 1 % (ref 0–7)
ERYTHROCYTE [DISTWIDTH] IN BLOOD BY AUTOMATED COUNT: 16.1 % (ref 11.5–14.5)
GLUCOSE SERPL-MCNC: 101 MG/DL (ref 65–100)
HCT VFR BLD AUTO: 37.4 % (ref 35–47)
HGB BLD-MCNC: 11.5 G/DL (ref 11.5–16)
IMM GRANULOCYTES # BLD AUTO: 0.1 K/UL (ref 0–0.04)
IMM GRANULOCYTES NFR BLD AUTO: 1 % (ref 0–0.5)
LYMPHOCYTES # BLD: 0.8 K/UL (ref 0.8–3.5)
LYMPHOCYTES NFR BLD: 10 % (ref 12–49)
MCH RBC QN AUTO: 27.3 PG (ref 26–34)
MCHC RBC AUTO-ENTMCNC: 30.7 G/DL (ref 30–36.5)
MCV RBC AUTO: 88.6 FL (ref 80–99)
MONOCYTES # BLD: 0.5 K/UL (ref 0–1)
MONOCYTES NFR BLD: 6 % (ref 5–13)
NEUTS SEG # BLD: 6.9 K/UL (ref 1.8–8)
NEUTS SEG NFR BLD: 81 % (ref 32–75)
NRBC # BLD: 0 K/UL (ref 0–0.01)
NRBC BLD-RTO: 0 PER 100 WBC
PLATELET # BLD AUTO: 247 K/UL (ref 150–400)
PMV BLD AUTO: 11.1 FL (ref 8.9–12.9)
POTASSIUM SERPL-SCNC: 3 MMOL/L (ref 3.5–5.1)
RBC # BLD AUTO: 4.22 M/UL (ref 3.8–5.2)
SODIUM SERPL-SCNC: 142 MMOL/L (ref 136–145)
WBC # BLD AUTO: 8.4 K/UL (ref 3.6–11)

## 2021-10-18 PROCEDURE — 74011250636 HC RX REV CODE- 250/636: Performed by: INTERNAL MEDICINE

## 2021-10-18 PROCEDURE — 85025 COMPLETE CBC W/AUTO DIFF WBC: CPT

## 2021-10-18 PROCEDURE — 92526 ORAL FUNCTION THERAPY: CPT

## 2021-10-18 PROCEDURE — 80048 BASIC METABOLIC PNL TOTAL CA: CPT

## 2021-10-18 PROCEDURE — 65660000000 HC RM CCU STEPDOWN

## 2021-10-18 PROCEDURE — 77010033678 HC OXYGEN DAILY

## 2021-10-18 PROCEDURE — 74011250637 HC RX REV CODE- 250/637: Performed by: STUDENT IN AN ORGANIZED HEALTH CARE EDUCATION/TRAINING PROGRAM

## 2021-10-18 PROCEDURE — 74011250637 HC RX REV CODE- 250/637: Performed by: NURSE PRACTITIONER

## 2021-10-18 PROCEDURE — 99233 SBSQ HOSP IP/OBS HIGH 50: CPT | Performed by: INTERNAL MEDICINE

## 2021-10-18 PROCEDURE — 36415 COLL VENOUS BLD VENIPUNCTURE: CPT

## 2021-10-18 PROCEDURE — 74011000250 HC RX REV CODE- 250: Performed by: INTERNAL MEDICINE

## 2021-10-18 PROCEDURE — 87040 BLOOD CULTURE FOR BACTERIA: CPT

## 2021-10-18 PROCEDURE — 74011250637 HC RX REV CODE- 250/637: Performed by: INTERNAL MEDICINE

## 2021-10-18 PROCEDURE — 74011250637 HC RX REV CODE- 250/637: Performed by: FAMILY MEDICINE

## 2021-10-18 PROCEDURE — 74011000250 HC RX REV CODE- 250: Performed by: FAMILY MEDICINE

## 2021-10-18 PROCEDURE — 94760 N-INVAS EAR/PLS OXIMETRY 1: CPT

## 2021-10-18 PROCEDURE — 74011250636 HC RX REV CODE- 250/636: Performed by: FAMILY MEDICINE

## 2021-10-18 PROCEDURE — 77030038269 HC DRN EXT URIN PURWCK BARD -A

## 2021-10-18 RX ORDER — DIGOXIN 0.25 MG/ML
250 INJECTION INTRAMUSCULAR; INTRAVENOUS
Status: COMPLETED | OUTPATIENT
Start: 2021-10-18 | End: 2021-10-18

## 2021-10-18 RX ORDER — ACYCLOVIR 800 MG/1
800 TABLET ORAL
Status: DISCONTINUED | OUTPATIENT
Start: 2021-10-18 | End: 2021-10-19

## 2021-10-18 RX ORDER — DILTIAZEM HYDROCHLORIDE 30 MG/1
60 TABLET, FILM COATED ORAL ONCE
Status: COMPLETED | OUTPATIENT
Start: 2021-10-18 | End: 2021-10-18

## 2021-10-18 RX ORDER — POTASSIUM CHLORIDE 7.45 MG/ML
10 INJECTION INTRAVENOUS
Status: COMPLETED | OUTPATIENT
Start: 2021-10-18 | End: 2021-10-18

## 2021-10-18 RX ORDER — METOPROLOL TARTRATE 25 MG/1
25 TABLET, FILM COATED ORAL EVERY 6 HOURS
Status: DISCONTINUED | OUTPATIENT
Start: 2021-10-18 | End: 2021-10-19

## 2021-10-18 RX ADMIN — DILTIAZEM HYDROCHLORIDE 60 MG: 30 TABLET, FILM COATED ORAL at 12:37

## 2021-10-18 RX ADMIN — Medication 10 ML: at 20:17

## 2021-10-18 RX ADMIN — DILTIAZEM HYDROCHLORIDE 60 MG: 30 TABLET, FILM COATED ORAL at 01:44

## 2021-10-18 RX ADMIN — WATER 1 G: 1 INJECTION INTRAMUSCULAR; INTRAVENOUS; SUBCUTANEOUS at 18:17

## 2021-10-18 RX ADMIN — METOPROLOL TARTRATE 25 MG: 25 TABLET, FILM COATED ORAL at 15:12

## 2021-10-18 RX ADMIN — METRONIDAZOLE 500 MG: 500 INJECTION, SOLUTION INTRAVENOUS at 17:14

## 2021-10-18 RX ADMIN — DILTIAZEM HYDROCHLORIDE 60 MG: 30 TABLET, FILM COATED ORAL at 05:25

## 2021-10-18 RX ADMIN — FUROSEMIDE 40 MG: 10 INJECTION, SOLUTION INTRAMUSCULAR; INTRAVENOUS at 09:37

## 2021-10-18 RX ADMIN — METRONIDAZOLE 500 MG: 500 INJECTION, SOLUTION INTRAVENOUS at 08:57

## 2021-10-18 RX ADMIN — ACYCLOVIR 800 MG: 800 TABLET ORAL at 18:17

## 2021-10-18 RX ADMIN — POTASSIUM CHLORIDE 10 MEQ: 10 INJECTION, SOLUTION INTRAVENOUS at 12:38

## 2021-10-18 RX ADMIN — METOPROLOL TARTRATE 5 MG: 5 INJECTION INTRAVENOUS at 04:30

## 2021-10-18 RX ADMIN — DILTIAZEM HYDROCHLORIDE 60 MG: 30 TABLET, FILM COATED ORAL at 20:15

## 2021-10-18 RX ADMIN — METOPROLOL TARTRATE 25 MG: 25 TABLET, FILM COATED ORAL at 20:15

## 2021-10-18 RX ADMIN — METOPROLOL TARTRATE 25 MG: 25 TABLET, FILM COATED ORAL at 09:37

## 2021-10-18 RX ADMIN — POTASSIUM BICARBONATE 40 MEQ: 391 TABLET, EFFERVESCENT ORAL at 09:37

## 2021-10-18 RX ADMIN — ACYCLOVIR 800 MG: 800 TABLET ORAL at 08:57

## 2021-10-18 RX ADMIN — Medication 10 ML: at 08:59

## 2021-10-18 RX ADMIN — ACYCLOVIR 800 MG: 800 TABLET ORAL at 15:04

## 2021-10-18 RX ADMIN — ACYCLOVIR 800 MG: 800 TABLET ORAL at 20:15

## 2021-10-18 RX ADMIN — DIGOXIN 250 MCG: 0.25 INJECTION INTRAMUSCULAR; INTRAVENOUS at 09:38

## 2021-10-18 RX ADMIN — ACYCLOVIR 800 MG: 800 TABLET ORAL at 12:37

## 2021-10-18 RX ADMIN — POTASSIUM CHLORIDE 10 MEQ: 10 INJECTION, SOLUTION INTRAVENOUS at 15:12

## 2021-10-18 RX ADMIN — DIGOXIN 250 MCG: 0.25 INJECTION INTRAMUSCULAR; INTRAVENOUS at 12:37

## 2021-10-18 RX ADMIN — ACYCLOVIR 800 MG: 800 TABLET ORAL at 01:45

## 2021-10-18 RX ADMIN — POTASSIUM CHLORIDE 10 MEQ: 10 INJECTION, SOLUTION INTRAVENOUS at 13:56

## 2021-10-18 RX ADMIN — METRONIDAZOLE 500 MG: 500 INJECTION, SOLUTION INTRAVENOUS at 23:25

## 2021-10-18 RX ADMIN — DILTIAZEM HYDROCHLORIDE 60 MG: 30 TABLET, FILM COATED ORAL at 17:14

## 2021-10-18 RX ADMIN — ESCITALOPRAM OXALATE 10 MG: 10 TABLET ORAL at 18:17

## 2021-10-18 NOTE — PROGRESS NOTES
[facility]  Hospitalist Group                                                                                          Hospitalist Progress Note  Robe Roman MD  Answering service: [phone] from in house phone        Date of Service:  10/18/2021  NAME:  Wilbert Pearce  :  3/16/1931  MRN:  [id number]      Admission Summary:   Copied form admit: \" Wilbert Pearce is a 80 y.o. female who presents with shortness of breath     Patient presented to the hospital with hypoxia, apparently patient has home health and they noticed that the patient's oxygen saturation has been running low, it was running in the 80s, patient was brought to the hospital.  Patient is a poor historian, has history of dementia, so not much history could be obtained from the patient, patient is complaining of some abdominal pain, when patient arrived to the hospital was found to be in A. fib with RVR, was requested to be admitted to the hospitalist service. Patient currently appears somewhat uncomfortable. I tried calling patient's daughter Nell Lorenzo, and left a voicemail \"    Interval history / Subjective:     10/18/2021 :    Tachy to 140's dig given   Shingles; on antivirals as of last pm   Cont on dysphagia diet, discussed with daughter, decision is no MBS: as if test poorly, no decision to not feed and no plans for feeding tube;    Palliative med to weigh in    DNR continues, portable copy DDNR on chart        Assessment & Plan:     A. fib with RVR- diltz drip, po replacement as feel pt can take;   - Tiff Caro iv dig  10/17 and again   - iv dig 10/18/2021 ; dilt additional oral dose as well        Acute hypoxic respiratory failure - 02 /nebs/monitor   - no acute resp distress on exam     Community-acquired pneumonia- aspiration:  CT 10/14:  1. No pulmonary embolus. 2.  Pulmonary edema pattern with moderate right and small left pleural  effusions.   3.  Debris/secretions in the central airways, increased risk for aspiration  Pneumonitis. 10/18/2021 :   Tachy to 140's dig given   Shingles; on antivirals as of last pm   Cont on dysphagia diet, discussed with daughter, decision is no MBS: as if test poorly, no decision to not feed and no plans for feeding tube;    Palliative med to weigh in    DNR continues, portable copy DDNR on chart     Hypertension  Adjust meds as needed  BP Readings from Last 1 Encounters:   10/18/21 (!) 164/59        Shingles: 10/17:   - acyclovir started     CoDE status; DNR     Hypokalemia: replacing     Hyperlipidemia    Dysphagia: speech eval  - done, puree/honey thickness liquid        Hospital Problems  Date Reviewed: 2/12/2021        Codes Class Noted POA    Atrial fibrillation [facility]) ICD-10-CM: I48.91  ICD-9-CM: 427.31  10/14/2021 Unknown                  After personally interviewing pt at bedside the following is noted on 10/18/2021 :    Review of Systems   Unable to perform ROS: Critical illness (letharthy/ sleepy )              I had a face to face encounter with patient on 10/18/2021 at bedside for the following physical exam:     PHYSICAL EXAM:    Visit Vitals  BP (!) 164/59 (BP 1 Location: Right arm, BP Patient Position: At rest)   Pulse (!) 144   Temp 100.2 °F (37.9 °C)   Resp 22   Ht 5' 2\" (1.575 m)   Wt 66.5 kg (146 lb 8 oz)   SpO2 97%   BMI 26.80 kg/m²          Physical Exam  Constitutional:       General: She is not in acute distress. Appearance: She is obese. She is not ill-appearing. Comments: Marked debility, poor in bed mobility    HENT:      Head: Normocephalic and atraumatic. Right Ear: External ear normal.      Left Ear: External ear normal.      Mouth/Throat:      Mouth: Mucous membranes are dry. Pharynx: Oropharynx is clear. Eyes:      Extraocular Movements: Extraocular movements intact. Conjunctiva/sclera: Conjunctivae normal.      Pupils: Pupils are equal, round, and reactive to light.    Cardiovascular:      Rate and Rhythm: Tachycardia present. Rhythm irregular. Pulmonary:      Effort: Pulmonary effort is normal.      Breath sounds: Normal breath sounds. Comments: Coarse no wheezing   Abdominal:      General: Abdomen is flat. Bowel sounds are normal.      Palpations: Abdomen is soft. Musculoskeletal:         General: No swelling or deformity. Skin:     Comments: See other notes; shingles flank    Neurological:      Mental Status: Mental status is at baseline. Comments: Sleepy    Psychiatric:         Mood and Affect: Mood normal.         Behavior: Behavior normal.      Comments: Not agitated not anxious demented ; is sleepy                      Data Review:    Review and/or order of clinical lab test      Labs:     Recent Labs     10/18/21  0601 10/17/21  0123   WBC 8.4 7.2   HGB 11.5 10.4*   HCT 37.4 34.2*    236     Recent Labs     10/18/21  0601 10/17/21  0123    142   K 3.0* 3.2*   * 117*   CO2 25 21   BUN 12 12   CREA 0.84 0.75   * 117*   CA 8.0* 7.9*   MG  --  1.8     Recent Labs     10/17/21  0123   ALT 16      TBILI 0.3   TP 6.4   ALB 2.3*   GLOB 4.1*     No results for input(s): INR, PTP, APTT, INREXT, INREXT in the last 72 hours. No results for input(s): FE, TIBC, PSAT, FERR in the last 72 hours. No results found for: FOL, RBCF   No results for input(s): PH, PCO2, PO2 in the last 72 hours. No results for input(s): CPK, CKNDX, TROIQ in the last 72 hours.     No lab exists for component: CPKMB  Lab Results   Component Value Date/Time    Cholesterol, total 190 06/24/2012 05:15 AM    HDL Cholesterol 30 06/24/2012 05:15 AM    LDL, calculated 130.4 (H) 06/24/2012 05:15 AM    Triglyceride 148 06/24/2012 05:15 AM    CHOL/HDL Ratio 6.3 (H) 06/24/2012 05:15 AM     Lab Results   Component Value Date/Time    Glucose (POC) 107 10/15/2021 11:16 AM    Glucose (POC) 154 (H) 02/16/2021 11:13 AM    Glucose (POC) 116 (H) 06/18/2012 01:30 PM     Lab Results   Component Value Date/Time    Color YELLOW/STRAW 02/10/2021 03:33 PM    Appearance CLEAR 02/10/2021 03:33 PM    Specific gravity 1.019 02/10/2021 03:33 PM    pH (UA) 5.0 02/10/2021 03:33 PM    Protein TRACE (A) 02/10/2021 03:33 PM    Glucose Negative 02/10/2021 03:33 PM    Ketone Negative 02/10/2021 03:33 PM    Bilirubin Negative 02/10/2021 03:33 PM    Urobilinogen 0.2 02/10/2021 03:33 PM    Nitrites Negative 02/10/2021 03:33 PM    Leukocyte Esterase TRACE (A) 02/10/2021 03:33 PM    Epithelial cells FEW 02/10/2021 03:33 PM    Bacteria Negative 02/10/2021 03:33 PM    WBC 5-10 02/10/2021 03:33 PM    RBC 0-5 02/10/2021 03:33 PM         Medications Reviewed:     Current Facility-Administered Medications   Medication Dose Route Frequency    acyclovir (ZOVIRAX) tablet 800 mg  800 mg Oral 5XD    metoprolol tartrate (LOPRESSOR) tablet 25 mg  25 mg Oral Q6H    potassium chloride 10 mEq in 100 ml IVPB  10 mEq IntraVENous Q1H    dilTIAZem IR (CARDIZEM) tablet 60 mg  60 mg Oral AC&HS    metoprolol (LOPRESSOR) injection 5 mg  5 mg IntraVENous Q6H PRN    albuterol-ipratropium (DUO-NEB) 2.5 MG-0.5 MG/3 ML  3 mL Nebulization Q4H PRN    metroNIDAZOLE (FLAGYL) IVPB premix 500 mg  500 mg IntraVENous Q8H    escitalopram oxalate (LEXAPRO) tablet 10 mg  10 mg Oral QPM    sodium chloride (NS) flush 5-40 mL  5-40 mL IntraVENous Q8H    sodium chloride (NS) flush 5-40 mL  5-40 mL IntraVENous PRN    acetaminophen (TYLENOL) tablet 650 mg  650 mg Oral Q4H PRN    cefTRIAXone (ROCEPHIN) 1 g in sterile water (preservative free) 10 mL IV syringe  1 g IntraVENous Q24H    azithromycin (ZITHROMAX) 500 mg in 0.9% sodium chloride 250 mL (VIAL-MATE)  500 mg IntraVENous Q24H     ______________________________________________________________________  EXPECTED LENGTH OF STAY: 3d 12h  ACTUAL LENGTH OF STAY:          [address]man, MD

## 2021-10-18 NOTE — PROGRESS NOTES
Problem: Dysphagia (Adult)  Goal: *Acute Goals and Plan of Care (Insert Text)  Description: Speech Therapy Goals  Initiated 10/15/2021  1. Patient will tolerate pureed diet, moderately thickened liquids without adverse response and free of s/s aspiration within 7 days. Outcome: Resolved/Met   SPEECH LANGUAGE PATHOLOGY DYSPHAGIA TREATMENT/DISCHARGE  Patient: Marc Monaco (72 y.o. female)  Date: 10/18/2021  Diagnosis: Atrial fibrillation ([facility] [I48.91] <principal problem not specified>       Precautions:      ASSESSMENT:  Rn reporting tolerance with puree diet/ honey thick liquids; however, she was passed on by another nurse that there was some concern for tolerance. Patient drowsy, agreeable to PO. She tolerated tsp and straw sips of honey thick liquid along with purees without issue. Pureed consistency and honey thick liquids is patient's baseline diet which is the most restrictive diet option at this time. Recommend continuing on her baseline diet. Suspect tolerance will directly mirror mentation/alertness. PLAN:  Continue on baseline diet of pureed consistency/ moderately thick/honey thick liquids   Strict upright   Crush meds  Assistance with PO  If concerns for tolerance, would hold PO and re-assess when fully alert and accepting. She is already on the most restrictive diet possible    Patient will be discharged from acute skilled speech therapy at this time. Rationale for discharge:  Ike Bernal reached    Discharge Recommendations:  Home Health     SUBJECTIVE:   Patient stated i'll rest.     OBJECTIVE:   Cognitive and Communication Status:  Neurologic State: Alert, Confused  Orientation Level: Oriented to person, Disoriented to place, Disoriented to situation, Disoriented to time  Cognition: Follows commands, Memory loss, Poor safety awareness    Perception: Appears intact    Perseveration: No perseveration noted    Safety/Judgement: Decreased awareness of environment  Dysphagia Treatment:    P.O. Trials:  Patient Position: up in bed  Vocal quality prior to P.O.: No impairment  Consistency Presented: Honey thick liquid;Puree  How Presented: SLP-fed/presented;Straw;Spoon     Bolus Acceptance: No impairment  Bolus Formation/Control: Impaired  Type of Impairment: Delayed  Propulsion: Delayed (# of seconds)  Oral Residue: None  Initiation of Swallow: Delayed (# of seconds)  Laryngeal Elevation: Decreased                 Oral Phase Severity: Moderate  Pharyngeal Phase Severity : Moderate            NOMS:   The NOMS functional outcome measure was used to quantify this patient's level of swallowing impairment. Based on the NOMS, the patient was determined to be at level 3 for swallow function     NOMS Swallowing Levels:  Level 1 (CN): NPO  Level 2 (CM): NPO but takes consistency in therapy  Level 3 (CL): Takes less than 50% of nutrition p.o. and continues with nonoral feedings; and/or safe with mod cues; and/or max diet restriction  Level 4 (CK): Safe swallow but needs mod cues; and/or mod diet restriction; and/or still requires some nonoral feeding/supplements  Level 5 (CJ): Safe swallow with min diet restriction; and/or needs min cues  Level 6 (CI): Independent with p.o.; rare cues; usually self cues; may need to avoid some foods or needs extra time  Level 7 ([address] [place] [address]): Independent for all p.o.  JORY. (2003). National Outcomes Measurement System (NOMS): Adult Speech-Language Pathology User's Guide. Pain:  Pain Scale 1: Numeric (0 - 10)  Pain Intensity 1: 0       After treatment:   Patient left in no apparent distress in bed, Call bell within reach, and Nursing notified    COMMUNICATION/EDUCATION:       The patient's plan of care including recommendations, planned interventions, and recommended diet changes were discussed with: Registered nurse.      DARRYL Cramer  Time Calculation: 13 mins

## 2021-10-18 NOTE — PROGRESS NOTES
Called to evaluate torso rash. Seen and examined at bedside. T5-76 dermatome rash and blistering consistent with shingles. Start Acyclovir. HR noted in 100-115 range. GIve additional PO dose of dilt 60.

## 2021-10-18 NOTE — PROGRESS NOTES
0800: care assumed from noc shift    Patient feeling fair, limited words. Answers questions with one to two words. Dtr at bedside, very supportive. Shingles present on left flank. Contact precautions continued and dtr well educated on this. POC reviewed. Patient with UCAF, improvement noted throughout the day with IV dig and PO dilt. Patient drowsy. Oriented only to self. Hypokalemic, replaced as ordered. Monitoring closely. Bed alarm on. Repo by lift team q2h. Safety maintained. Patient tolerating weaning supplemental O2 from 5L to 3L throughout the day. Remains dyspneic with activity, including with eating, though recovers quickly.

## 2021-10-18 NOTE — PROGRESS NOTES
Transitions of Care Plan:  RUR: 14%  Clinical Plan: HR control  Consults: Cardiology  Baseline: total care; has 24/7 private duty caregiver support at home; extensive DME; supportive daughter  Disposition: home with previous caregiver support; BLS transport    Chart review for clinical update -   Med management HR control    Disposition:  Home with caregiver support    CM will continue to follow.     William Felix, MPH  Care Manager  [address] [place] [address]  Available via [address] [place] [address] or  [phone]

## 2021-10-18 NOTE — PROGRESS NOTES
Cardiac Electrophysiology Hospital Progress Note     Subjective:      Milagro Lovett is a 80 y.o. patient who is seen for management of AF with RVR. Interim:  Stopped diltiazem IV gtt yesterday, transitioned to diltiazem IR 60 mg po q 6 hrs. Still in AF, rate suboptimally controlled today (110s-140s at rest). CXR yesterday showed increased moderate bilateral pulmonary edema and/or pneumonia. Diuresing with furosemide 40 mg IV bid. She remains hypokalemic despite supplementation (K 3 today). BP mildly elevated. Diagnosed with shingles overnight, T5-T6 dermatome rash/blistering. Still not oriented, has history of dementia. HPI:  She presented to the ER on 10/14/2021 for hypoxia, reportedly in 80s since 10/13/2021. Noted to be in [address] [place] [address] with RVR at time of admission. Diltiazem IV gtt initiated. CXR showed bibasilar infiltrate (R>L) consistent with pneumonia. Chest CTA showed pulmonary edema pattern with moderate right & small left pleural effusions. COVID negative. Echo showed LVEF 50-55% with dilated RV, JACKIE, mild to mod MR, mild TR, & mild PH. No OAC due to history of GI bleed. Previous:  Previous aspiration pneumonia. GI bleed. Chronically fractured left humerus.         Problem List  Date Reviewed: 2/12/2021          Codes Class Noted    Atrial fibrillation [facility]) ICD-10-CM: I48.91  ICD-9-CM: 427.31  10/14/2021        COPD [facility]chronic obstructive pulmonary disease) ([facility] ICD-10-CM: J44.9  ICD-9-CM: 758  2/18/2021        Pneumonia ICD-10-CM: J18.9  ICD-9-CM: 486  2/10/2021        Rectal bleeding ICD-10-CM: K62.5  ICD-9-CM: 569.3  1/27/2014        Mass of pancreas ICD-10-CM: K86.89  ICD-9-CM: 577.8  1/27/2014    Overview Signed 1/27/2014  6:22 PM by Davidson Claudio MD     1.5 CM DENSITY POST HEAD OF PANCREAS ON CT 1/27/2014             Diverticulosis ICD-10-CM: K57.90  ICD-9-CM: 562.10  1/27/2014        GI bleed ICD-10-CM: K92.2  ICD-9-CM: 578.9  1/17/2014 Hypovitaminosis D ICD-10-CM: E55.9  ICD-9-CM: 268.9  12/17/2013        Osteoporosis ICD-10-CM: M81.0  ICD-9-CM: 733.00  12/16/2013        Low back pain ICD-10-CM: M54.50  ICD-9-CM: 724.2  12/15/2013    Overview Addendum 12/16/2013  4:09 PM by David Colon MD     Subacute to chronic T12 fracture             Altered mental status ICD-10-CM: R41.82  ICD-9-CM: 780.97  9/3/2013        Alkaline phosphatase elevation ICD-10-CM: R74.8  ICD-9-CM: 790.5  9/2/2013        Hyperlipidemia ICD-10-CM: E78.5  ICD-9-CM: 272.4  6/24/2012        Intraventricular hemorrhage, nontraumatic (HCC) ICD-10-CM: I61.5  ICD-9-CM: 860  6/18/2012        HTN (hypertension) ICD-10-CM: I10  ICD-9-CM: 401.9  6/18/2012        Dementia ([facility] ICD-10-CM: F93.03  ICD-9-CM: 294.20  11/10/2011        Depression ICD-10-CM: K91. A  ICD-9-CM: 835  11/10/2011        Right hip pain ICD-10-CM: M25.551  ICD-9-CM: 719.45  6/4/2011                Current Facility-Administered Medications   Medication Dose Route Frequency Provider Last Rate Last Admin    acyclovir (ZOVIRAX) tablet 800 mg  800 mg Oral 5XD Siria Slaughter MD   800 mg at 10/18/21 0145    digoxin (LANOXIN) injection 250 mcg  250 mcg IntraVENous NOW Millie Avilez MD        metoprolol tartrate (LOPRESSOR) tablet 25 mg  25 mg Oral Q6H Sai TORO NP        dilTIAZem IR (CARDIZEM) tablet 60 mg  60 mg Oral AC&HS Fatuma Almaguer MD   60 mg at 10/18/21 0525    metoprolol (LOPRESSOR) injection 5 mg  5 mg IntraVENous Q6H PRN Fatuma Almaguer MD   5 mg at 10/18/21 0430    furosemide (LASIX) injection 40 mg  40 mg IntraVENous BID Fatuma Almaguer MD   40 mg at 10/17/21 1800    potassium bicarb-citric acid (EFFER-K) tablet 40 mEq  40 mEq Oral BID Fatuma Almaguer MD   40 mEq at 10/17/21 1800    albuterol-ipratropium (DUO-NEB) 2.5 MG-0.5 MG/3 ML  3 mL Nebulization Q4H PRN Ayan Duenas MD        metroNIDAZOLE (FLAGYL) IVPB premix 500 mg  500 mg IntraVENous Q8H Millie Avilez  mL/hr at 10/17/21 2213 500 mg at 10/17/21 2213    escitalopram oxalate (LEXAPRO) tablet 10 mg  10 mg Oral QPM Shani Boykin MD   10 mg at 10/17/21 1800    sodium chloride (NS) flush 5-40 mL  5-40 mL IntraVENous Q8H Shani Boykin MD   10 mL at 10/17/21 2230    sodium chloride (NS) flush 5-40 mL  5-40 mL IntraVENous PRN Shani Boykin MD        acetaminophen (TYLENOL) tablet 650 mg  650 mg Oral Q4H PRN Shani Boykin MD   650 mg at 10/17/21 1100    cefTRIAXone (ROCEPHIN) 1 g in sterile water (preservative free) 10 mL IV syringe  1 g IntraVENous Q24H Shani Boykin MD   1 g at 10/17/21 1830    azithromycin (ZITHROMAX) 500 mg in 0.9% sodium chloride 250 mL (VIAL-MATE)  500 mg IntraVENous Q24H Shani Boykin  mL/hr at 10/17/21 2351 500 mg at 10/17/21 2351     Allergies   Allergen Reactions    Codeine Hives     Pt cannot really remember allergy, uncertain of reactioni    Ibuprofen Other (comments)     Abn LFT'S & FEVER    Pcn [Penicillins] Hives     Past Medical History:   Diagnosis Date    Alcoholism ([facility] 11/10/2011    Arthritis     COPD (chronic obstructive pulmonary disease) ([facility] 2/18/2021    Dementia     Dementia ([facility] 11/10/2011    Depression 11/10/2011    Diverticulosis 1/27/2014    HTN (hypertension) 6/18/2012    Hyperlipidemia 6/24/2012    Hypertension     Hypovitaminosis D 12/17/2013    Intraventricular hemorrhage, nontraumatic ([facility] 6/18/2012    Mass of pancreas 1/27/2014    1.5 CM DENSITY POST HEAD OF PANCREAS ON CT 1/27/2014    Osteoporosis 12/16/2013    Other ill-defined conditions(799.89)     broken right shoulder    Stroke [facility])      Past Surgical History:   Procedure Laterality Date    HX APPENDECTOMY      HX CATARACT REMOVAL      HX CHOLECYSTECTOMY      HX HYSTERECTOMY      HX ORTHOPAEDIC      repair lt shoulder fx     No family history on file. Social History     Tobacco Use    Smoking status: Former Smoker     Packs/day: 1.00     Years: 50.00     Pack years: 50.00   Substance Use Topics    Alcohol use:  Yes Comment: dtrs state amount unknown        Review of Systems: Unable to obtain review of systems due to patient's unoriented status, dementia. Objective:     Visit Vitals  BP (!) 141/91 (BP 1 Location: Left upper arm, BP Patient Position: At rest)   Pulse (!) 151   Temp 98.4 °F (36.9 °C)   Resp 20   Ht 5' 2\" (1.575 m)   Wt 145 lb 12.8 oz (66.1 kg)   SpO2 100%   BMI 26.67 kg/m²      Physical Exam:   Constitutional: Well-developed and well-nourished. No respiratory distress. Head: Normocephalic and atraumatic. Eyes: Pupils are equal, round  ENT: Unsure regarding hearing. Little verbalization. Neck: Supple. No JVD present. Cardiovascular: Tachy rate, irregular rhythm. Exam reveals no gallop and no friction rub. No murmur heard. Pulmonary/Chest: Effort normal and breath sounds normal. No wheezes. Abdominal: Soft, no tenderness. Musculoskeletal: Moves extremities independently. Vasc/lymphatic: + bilat lower extremity edema  Neurological: Awake, not oriented. Skin: Skin is warm and dry  Psychiatric: Minimal verbalization. EKG (10/14/2021): AF, ventricular rate 158 bpm, T wave inversion inferiorly & anterolaterally. Assessment/Plan:     Imaging/Studies:  Echo (10/16/2021): LVEF 50-55%, upper normal wall thickness. Mildly dilated RV. Mod dilated LA, severely dilated RA. Mod MAC, mild MS, mild to mod MR. Mild TR. Mild PH. CTA chest (10/14/2021): No PE. Coronary artery calcifications. Pulmonary edema pattern with mod right & small left pleural effusions. Debris/secretions in the central airways, increased risk for aspiration pneumonitis. AF with RVR: Suboptimal control on current diltiazem IR & metoprolol po. Increase metoprolol to 25 mg po q 6 hrs. Advise caution with digoxin, had not ordered due to hypokalemia & hypocalcemia. Pulmonary edema: Noted via chest CTA. Persistent pleural effusions on CXR 10/17/2021. Continue diuresis with furosemide.   LVEF 50-55% on echo.    Hypokalemia: K 3 today despite supplementation yesterday. Supplement with 40 mEq po bid. Able to take moderately thick liquids & purees. Taking applesauce po. [place], TAV[facility]DANIEL [address] [facility] [place] [facility]  10/18/21    Addendum from EP attending: This patient was seen and examined by me in a face-to-face visit today. I reviewed the medical record including lab results, imaging and other provider notes. I confirmed the history as described above. I spoke to the patient, obtained history and examined the patient. I discussed assessments and plans in details with nurse practitioner. Below are my treatment plans and recommendations. She said she was feeling fine  Vital signs with AF RVR  Exam shows     Nursing note and vitals reviewed. Constitutional: well-developed and well-nourished. No distress. Head: Normocephalic and atraumatic. Eyes: Pupils are equal, round   Neck: Neck supple. No JVD present. Cardiovascular: fast rate, irregular rhythm and normal heart sounds. Exam reveals no gallop and no friction rub. No murmur heard. Pulmonary/Chest: Effort normal and breath sounds normal. No wheezes. Abdominal: Soft, no rebound. Musculoskeletal: no tenderness. Neurological: alert   Skin: Skin is warm and dry, not diaphoretic. Psychiatric: flat affect. Assessment and Plan:   AFIB RVR: still need to increase metoprolol   If K is normal, can given digoxin    Pulmonary edema: will continue with diuresis    Thank you for involving me in this patient's care and please call with further concerns or questions. Franklyn Montes M.D.   Electrophysiology/Cardiology  [facility]  [phone] , [address]  [address]  [phone]

## 2021-10-18 NOTE — CALL BACK NOTE
Perfectserve message from nurse:    HR jumping around lowest was 111 while highest just got up to 152. Adm PRN metoprolol. Response: please give morning dose of cardizem 60 mg due at 7:30 AM now.     Patient Vitals for the past 24 hrs:   BP Temp Pulse Resp SpO2   10/18/21 0434   (!) 116     10/18/21 0432   (!) 144     10/18/21 0420 (!) 154/90 100 °F (37.8 °C) (!) 115 20 100 %   10/18/21 0356   (!) 124     10/18/21 0158   (!) 121     10/18/21 0157   (!) 141  100 %   10/17/21 2252   85  94 %   10/17/21 2215   (!) 142     10/17/21 2203   (!) 123     10/17/21 1954   (!) 115     10/17/21 1924 (!) 149/72 98.4 °F (36.9 °C)  22 97 %   10/17/21 1533 (!) 148/88 99.7 °F (37.6 °C) (!) 102 22 92 %   10/17/21 1126 (!) 177/98 100.1 °F (37.8 °C) (!) 107 20 97 %   10/17/21 1056 (!) 154/94  (!) 110  97 %   10/17/21 1026 (!) 144/91  95  97 %   10/17/21 0956 (!) 153/90  98  95 %   10/17/21 0700 (!) 144/75 100.3 °F (37.9 °C) 86 20 95 %   10/17/21 0630 122/67  83  93 %   10/17/21 0600 (!) 152/101  99  97 %   10/17/21 0554   (!) 109     10/17/21 0530 128/75  (!) 102           Current Facility-Administered Medications:     acyclovir (ZOVIRAX) tablet 800 mg, 800 mg, Oral, 5XD, Siria Slaughter MD, 800 mg at 10/18/21 0145    dilTIAZem IR (CARDIZEM) tablet 60 mg, 60 mg, Oral, AC&HS, Parviz Gautam MD, 60 mg at 10/17/21 2158    metoprolol (LOPRESSOR) injection 5 mg, 5 mg, IntraVENous, Q6H PRN, Parviz Gautam MD, 5 mg at 10/18/21 0430    metoprolol tartrate (LOPRESSOR) tablet 25 mg, 25 mg, Oral, BID, Parviz Gautam MD, 25 mg at 10/17/21 2158    furosemide (LASIX) injection 40 mg, 40 mg, IntraVENous, BID, Parviz Gautam MD, 40 mg at 10/17/21 1800    potassium bicarb-citric acid (EFFER-K) tablet 40 mEq, 40 mEq, Oral, BID, Parviz Gautam MD, 40 mEq at 10/17/21 1800    albuterol-ipratropium (DUO-NEB) 2.5 MG-0.5 MG/3 ML, 3 mL, Nebulization, Q4H PRN, Candy Bond MD    metroNIDAZOLE (FLAGYL) IVPB premix 500 mg, 500 mg, IntraVENous, Q8H, Hyun Martínez MD, Last Rate: 100 mL/hr at 10/17/21 2213, 500 mg at 10/17/21 2213    escitalopram oxalate (LEXAPRO) tablet 10 mg, 10 mg, Oral, QPM, Corona Alarcon MD, 10 mg at 10/17/21 1800    sodium chloride (NS) flush 5-40 mL, 5-40 mL, IntraVENous, Q8H, Yash Hardin MD, 10 mL at 10/17/21 2230    sodium chloride (NS) flush 5-40 mL, 5-40 mL, IntraVENous, PRN, Corona Alarcon MD    acetaminophen (TYLENOL) tablet 650 mg, 650 mg, Oral, Q4H PRN, Corona Alarcon MD, 650 mg at 10/17/21 1100    cefTRIAXone (ROCEPHIN) 1 g in sterile water (preservative free) 10 mL IV syringe, 1 g, IntraVENous, Q24H, Yash Hardin MD, 1 g at 10/17/21 1830    azithromycin (ZITHROMAX) 500 mg in 0.9% sodium chloride 250 mL (VIAL-MATE), 500 mg, IntraVENous, Q24H, Yash Hardin MD, Last Rate: 250 mL/hr at 10/17/21 2351, 500 mg at 10/17/21 2351

## 2021-10-18 NOTE — PROGRESS NOTES
Charting and patient care of [facility] by student orientee Lalitha Matthews from 5598 to 0730 was supervised and reviewed by this RN. 1930: Bedside shift change report given to Max Shane RN (oncoming nurse) by Max Shane RN (offgoing nurse). Report included the following information SBAR, Kardex, Intake/Output, MAR, Recent Results, Med Rec Status and Cardiac Rhythm  . 2203: Metoprolol administered. HR occasionally in the 130-140's.   0020: Dr Terri Walker made aware of skin blistering left torso/underam, warm, red.  0055: Dr. Odessa Sr at bedside, confirmed left flank +shingles. Acyclovir & one time dose of dilt ordered for elevated -130's.   0430: Dr. Terri Walker aware via perfectserve of patients HR jumstanleyg from 111-152. Adm PRN Metoprolol. 0630: HR still elev despite cardizem adm early. Dr. Terri Walker made aware. 0533: Temperature 101.0 orally. Paired blood cultures ordered. 0700: Repeat temp orally 98.4 - unmedicated,.

## 2021-10-19 ENCOUNTER — APPOINTMENT (OUTPATIENT)
Dept: CT IMAGING | Age: 86
DRG: 177 | End: 2021-10-19
Attending: STUDENT IN AN ORGANIZED HEALTH CARE EDUCATION/TRAINING PROGRAM
Payer: MEDICARE

## 2021-10-19 VITALS
DIASTOLIC BLOOD PRESSURE: 71 MMHG | SYSTOLIC BLOOD PRESSURE: 140 MMHG | WEIGHT: 135.2 LBS | HEART RATE: 95 BPM | OXYGEN SATURATION: 99 % | TEMPERATURE: 97.6 F | HEIGHT: 62 IN | RESPIRATION RATE: 16 BRPM | BODY MASS INDEX: 24.88 KG/M2

## 2021-10-19 LAB
ANION GAP SERPL CALC-SCNC: 5 MMOL/L (ref 5–15)
BACTERIA SPEC CULT: NORMAL
BACTERIA SPEC CULT: NORMAL
BASOPHILS # BLD: 0.2 K/UL (ref 0–0.1)
BASOPHILS NFR BLD: 2 % (ref 0–1)
BNP SERPL-MCNC: 7630 PG/ML
BUN SERPL-MCNC: 14 MG/DL (ref 6–20)
BUN/CREAT SERPL: 16 (ref 12–20)
CALCIUM SERPL-MCNC: 8.5 MG/DL (ref 8.5–10.1)
CHLORIDE SERPL-SCNC: 108 MMOL/L (ref 97–108)
CO2 SERPL-SCNC: 26 MMOL/L (ref 21–32)
CREAT SERPL-MCNC: 0.86 MG/DL (ref 0.55–1.02)
DIFFERENTIAL METHOD BLD: ABNORMAL
EOSINOPHIL # BLD: 0 K/UL (ref 0–0.4)
EOSINOPHIL NFR BLD: 0 % (ref 0–7)
ERYTHROCYTE [DISTWIDTH] IN BLOOD BY AUTOMATED COUNT: 15.9 % (ref 11.5–14.5)
GLUCOSE BLD STRIP.AUTO-MCNC: 99 MG/DL (ref 65–117)
GLUCOSE SERPL-MCNC: 144 MG/DL (ref 65–100)
HCT VFR BLD AUTO: 37.9 % (ref 35–47)
HGB BLD-MCNC: 11.8 G/DL (ref 11.5–16)
IMM GRANULOCYTES # BLD AUTO: 0 K/UL
IMM GRANULOCYTES NFR BLD AUTO: 0 %
LYMPHOCYTES # BLD: 0.9 K/UL (ref 0.8–3.5)
LYMPHOCYTES NFR BLD: 11 % (ref 12–49)
MAGNESIUM SERPL-MCNC: 2.3 MG/DL (ref 1.6–2.4)
MCH RBC QN AUTO: 27.3 PG (ref 26–34)
MCHC RBC AUTO-ENTMCNC: 31.1 G/DL (ref 30–36.5)
MCV RBC AUTO: 87.7 FL (ref 80–99)
MONOCYTES # BLD: 0.4 K/UL (ref 0–1)
MONOCYTES NFR BLD: 5 % (ref 5–13)
NEUTS SEG # BLD: 6.3 K/UL (ref 1.8–8)
NEUTS SEG NFR BLD: 82 % (ref 32–75)
NRBC # BLD: 0 K/UL (ref 0–0.01)
NRBC BLD-RTO: 0 PER 100 WBC
PLATELET # BLD AUTO: 203 K/UL (ref 150–400)
PLATELET COMMENTS,PCOM: ABNORMAL
PMV BLD AUTO: 10.8 FL (ref 8.9–12.9)
POTASSIUM SERPL-SCNC: 3.5 MMOL/L (ref 3.5–5.1)
RBC # BLD AUTO: 4.32 M/UL (ref 3.8–5.2)
RBC MORPH BLD: ABNORMAL
RBC MORPH BLD: ABNORMAL
SERVICE CMNT-IMP: NORMAL
SODIUM SERPL-SCNC: 139 MMOL/L (ref 136–145)
WBC # BLD AUTO: 7.8 K/UL (ref 3.6–11)

## 2021-10-19 PROCEDURE — 80048 BASIC METABOLIC PNL TOTAL CA: CPT

## 2021-10-19 PROCEDURE — 74011000250 HC RX REV CODE- 250: Performed by: INTERNAL MEDICINE

## 2021-10-19 PROCEDURE — 74011250636 HC RX REV CODE- 250/636: Performed by: INTERNAL MEDICINE

## 2021-10-19 PROCEDURE — 36415 COLL VENOUS BLD VENIPUNCTURE: CPT

## 2021-10-19 PROCEDURE — 99233 SBSQ HOSP IP/OBS HIGH 50: CPT | Performed by: INTERNAL MEDICINE

## 2021-10-19 PROCEDURE — 74011250636 HC RX REV CODE- 250/636: Performed by: FAMILY MEDICINE

## 2021-10-19 PROCEDURE — 70450 CT HEAD/BRAIN W/O DYE: CPT

## 2021-10-19 PROCEDURE — 83880 ASSAY OF NATRIURETIC PEPTIDE: CPT

## 2021-10-19 PROCEDURE — 74011000258 HC RX REV CODE- 258: Performed by: INTERNAL MEDICINE

## 2021-10-19 PROCEDURE — 77030038269 HC DRN EXT URIN PURWCK BARD -A

## 2021-10-19 PROCEDURE — 82962 GLUCOSE BLOOD TEST: CPT

## 2021-10-19 PROCEDURE — 74011250637 HC RX REV CODE- 250/637: Performed by: STUDENT IN AN ORGANIZED HEALTH CARE EDUCATION/TRAINING PROGRAM

## 2021-10-19 PROCEDURE — 85025 COMPLETE CBC W/AUTO DIFF WBC: CPT

## 2021-10-19 PROCEDURE — 74011250637 HC RX REV CODE- 250/637: Performed by: NURSE PRACTITIONER

## 2021-10-19 PROCEDURE — 83735 ASSAY OF MAGNESIUM: CPT

## 2021-10-19 PROCEDURE — 77010033678 HC OXYGEN DAILY

## 2021-10-19 PROCEDURE — 74011250637 HC RX REV CODE- 250/637: Performed by: INTERNAL MEDICINE

## 2021-10-19 PROCEDURE — 94760 N-INVAS EAR/PLS OXIMETRY 1: CPT

## 2021-10-19 PROCEDURE — 65270000029 HC RM PRIVATE

## 2021-10-19 RX ORDER — HYOSCYAMINE SULFATE 0.12 MG/1
0.12 TABLET SUBLINGUAL
Status: DISCONTINUED | OUTPATIENT
Start: 2021-10-19 | End: 2021-10-20 | Stop reason: HOSPADM

## 2021-10-19 RX ORDER — MORPHINE SULFATE 2 MG/ML
2 INJECTION, SOLUTION INTRAMUSCULAR; INTRAVENOUS
Status: DISCONTINUED | OUTPATIENT
Start: 2021-10-19 | End: 2021-10-20 | Stop reason: HOSPADM

## 2021-10-19 RX ORDER — DIGOXIN 0.25 MG/ML
125 INJECTION INTRAMUSCULAR; INTRAVENOUS DAILY
Status: DISCONTINUED | OUTPATIENT
Start: 2021-10-19 | End: 2021-10-19

## 2021-10-19 RX ORDER — POTASSIUM CHLORIDE 7.45 MG/ML
10 INJECTION INTRAVENOUS
Status: DISCONTINUED | OUTPATIENT
Start: 2021-10-19 | End: 2021-10-19

## 2021-10-19 RX ORDER — LORAZEPAM 2 MG/ML
1 INJECTION INTRAMUSCULAR
Status: DISCONTINUED | OUTPATIENT
Start: 2021-10-19 | End: 2021-10-20 | Stop reason: HOSPADM

## 2021-10-19 RX ADMIN — DILTIAZEM HYDROCHLORIDE 60 MG: 30 TABLET, FILM COATED ORAL at 06:30

## 2021-10-19 RX ADMIN — METOPROLOL TARTRATE 25 MG: 25 TABLET, FILM COATED ORAL at 02:02

## 2021-10-19 RX ADMIN — Medication 10 ML: at 06:26

## 2021-10-19 RX ADMIN — AZITHROMYCIN MONOHYDRATE 500 MG: 500 INJECTION, POWDER, LYOPHILIZED, FOR SOLUTION INTRAVENOUS at 01:17

## 2021-10-19 RX ADMIN — DILTIAZEM HYDROCHLORIDE 2.5 MG/HR: 5 INJECTION INTRAVENOUS at 09:59

## 2021-10-19 RX ADMIN — METRONIDAZOLE 500 MG: 500 INJECTION, SOLUTION INTRAVENOUS at 06:14

## 2021-10-19 RX ADMIN — Medication 10 ML: at 23:22

## 2021-10-19 RX ADMIN — ACYCLOVIR 800 MG: 800 TABLET ORAL at 06:14

## 2021-10-19 RX ADMIN — DIGOXIN 125 MCG: 0.25 INJECTION INTRAMUSCULAR; INTRAVENOUS at 09:59

## 2021-10-19 RX ADMIN — ACYCLOVIR SODIUM 500 MG: 50 INJECTION, SOLUTION INTRAVENOUS at 11:00

## 2021-10-19 NOTE — PROGRESS NOTES
[facility] Adult  Hospitalist Group                                                                                          Hospitalist Progress Note  Jignesh Collins MD  Answering service: [phone] from in house phone        Date of Service:  10/19/2021  NAME:  Dannielle Otoole  :  3/16/1931  MRN:  [id number]      Admission Summary:   Copied form admit: \" Dannielle Otoole is a 80 y.o. female who presents with shortness of breath     Patient presented to the hospital with hypoxia, apparently patient has home health and they noticed that the patient's oxygen saturation has been running low, it was running in the 80s, patient was brought to the hospital.  Patient is a poor historian, has history of dementia, so not much history could be obtained from the patient, patient is complaining of some abdominal pain, when patient arrived to the hospital was found to be in A. fib with RVR, was requested to be admitted to the hospitalist service. Patient currently appears somewhat uncomfortable.   I tried calling patient's daughter Cynthia Chappell, and left a voicemail \"    Interval history / Subjective:     10/19/2021 :    Cont tachy   Low K addressed   Had changed neuro status, discussed w family, Dr. Jennifer Young, Dr. Maximino Alexander: -MD's agree w this Rajani Meeter as to conservative management    Conservative management indicated   CT  head for better understanding; not code stroke as no intervention planned       Assessment & Plan:     A. fib with RVR- diltz drip, po replacement as feel pt can take;   - Yamila Rios iv dig  10/17 and again   - iv dig 10/18/2021 ; dilt   additional oral dose as well   - 10/19/2021 : tachy, now pt not verbal/not following,  Case discussed w family; likely stoke,   Iv for control til other wise as in Hospice care/comfort care    Aucte CVA 10/19/2021      Had changed neuro status, discussed w family, Dr. Jenniefr Young, Dr. Maximino Alexander: -MD's agree w this writer for  conservative management.  Conservative management indicated CT  head for better understanding; not code stroke as no intervention planned       Acute hypoxic respiratory failure - 02 /nebs/monitor   - no acute resp distress on exam     Community-acquired pneumonia- aspiration:  CT 10/14:  1. No pulmonary embolus. 2.  Pulmonary edema pattern with moderate right and small left pleural  effusions. 3.  Debris/secretions in the central airways, increased risk for aspiration  Pneumonitis. 10/18/2021 :   Tachy to 140's dig given   Shingles; on antivirals as of last pm   Cont on dysphagia diet, discussed with daughter, decision is no MBS: as if test poorly, no decision to not feed and no plans for feeding tube;    Palliative med to weigh in    DNR continues, portable copy DDNR on chart     Hypertension  Adjust meds as needed  BP Readings from Last 1 Encounters:   10/19/21 (!) 140/61        Shingles: 10/17:   - acyclovir started -change to iv     CoDE status; DNR     Hypokalemia: replacing     Hyperlipidemia    Dysphagia: speech eval  - done, puree/honey thickness liquid        Hospital Problems  Date Reviewed: 2/12/2021        Codes Class Noted POA    Atrial fibrillation [facility]) ICD-10-CM: I48.91  ICD-9-CM: 427.31  10/14/2021 Unknown                  After personally interviewing pt at bedside the following is noted on 10/19/2021 :    Review of Systems   Unable to perform ROS: Critical illness (non verbal)              I had a face to face encounter with patient on 10/19/2021 at bedside for the following physical exam:     PHYSICAL EXAM:    Visit Vitals  BP (!) 140/61 (BP 1 Location: Right upper arm, BP Patient Position: At rest)   Pulse (!) 119   Temp 97.7 °F (36.5 °C)   Resp 24   Ht 5' 2\" (1.575 m)   Wt 61.3 kg (135 lb 3.2 oz)   SpO2 98%   BMI 24.73 kg/m²          Physical Exam  Constitutional:       General: She is in acute distress. Appearance: She is ill-appearing. She is not toxic-appearing or diaphoretic.    HENT: Head: Normocephalic and atraumatic. Right Ear: External ear normal.      Left Ear: External ear normal.      Nose: Nose normal.      Mouth/Throat:      Mouth: Mucous membranes are dry. Pharynx: Oropharynx is clear. Eyes:      General:         Right eye: No discharge. Left eye: No discharge. Cardiovascular:      Rate and Rhythm: Tachycardia present. Rhythm irregular. Pulmonary:      Effort: Pulmonary effort is normal.      Breath sounds: Normal breath sounds. Abdominal:      General: Abdomen is flat. There is no distension. Musculoskeletal:         General: No swelling or deformity. Skin:     General: Skin is warm and dry. Neurological:      Comments: Lt pablo ; facial ; and decreased tone of LUE,  ? LUE  Non verbal ; will not follow                      Data Review:    Review and/or order of clinical lab test      Labs:     Recent Labs     10/19/21  0127 10/18/21  0601   WBC 7.8 8.4   HGB 11.8 11.5   HCT 37.9 37.4    247     Recent Labs     10/19/21  0127 10/18/21  0601 10/17/21  0123    142 142   K 3.5 3.0* 3.2*    113* 117*   CO2 26 25 21   BUN 14 12 12   CREA 0.86 0.84 0.75   * 101* 117*   CA 8.5 8.0* 7.9*   MG 2.3  --  1.8     Recent Labs     10/17/21  0123   ALT 16      TBILI 0.3   TP 6.4   ALB 2.3*   GLOB 4.1*     No results for input(s): INR, PTP, APTT, INREXT, INREXT in the last 72 hours. No results for input(s): FE, TIBC, PSAT, FERR in the last 72 hours. No results found for: FOL, RBCF   No results for input(s): PH, PCO2, PO2 in the last 72 hours. No results for input(s): CPK, CKNDX, TROIQ in the last 72 hours.     No lab exists for component: CPKMB  Lab Results   Component Value Date/Time    Cholesterol, total 190 06/24/2012 05:15 AM    HDL Cholesterol 30 06/24/2012 05:15 AM    LDL, calculated 130.4 (H) 06/24/2012 05:15 AM    Triglyceride 148 06/24/2012 05:15 AM    CHOL/HDL Ratio 6.3 (H) 06/24/2012 05:15 AM     Lab Results   Component Value Date/Time    Glucose (POC) 99 10/19/2021 07:12 AM    Glucose (POC) 107 10/15/2021 11:16 AM    Glucose (POC) 154 (H) 02/16/2021 11:13 AM    Glucose (POC) 116 (H) 06/18/2012 01:30 PM     Lab Results   Component Value Date/Time    Color YELLOW/STRAW 02/10/2021 03:33 PM    Appearance CLEAR 02/10/2021 03:33 PM    Specific gravity 1.019 02/10/2021 03:33 PM    pH (UA) 5.0 02/10/2021 03:33 PM    Protein TRACE (A) 02/10/2021 03:33 PM    Glucose Negative 02/10/2021 03:33 PM    Ketone Negative 02/10/2021 03:33 PM    Bilirubin Negative 02/10/2021 03:33 PM    Urobilinogen 0.2 02/10/2021 03:33 PM    Nitrites Negative 02/10/2021 03:33 PM    Leukocyte Esterase TRACE (A) 02/10/2021 03:33 PM    Epithelial cells FEW 02/10/2021 03:33 PM    Bacteria Negative 02/10/2021 03:33 PM    WBC 5-10 02/10/2021 03:33 PM    RBC 0-5 02/10/2021 03:33 PM         Medications Reviewed:     Current Facility-Administered Medications   Medication Dose Route Frequency    acyclovir (ZOVIRAX) tablet 800 mg  800 mg Oral 5XD    metoprolol tartrate (LOPRESSOR) tablet 25 mg  25 mg Oral Q6H    dilTIAZem IR (CARDIZEM) tablet 60 mg  60 mg Oral AC&HS    metoprolol (LOPRESSOR) injection 5 mg  5 mg IntraVENous Q6H PRN    albuterol-ipratropium (DUO-NEB) 2.5 MG-0.5 MG/3 ML  3 mL Nebulization Q4H PRN    metroNIDAZOLE (FLAGYL) IVPB premix 500 mg  500 mg IntraVENous Q8H    escitalopram oxalate (LEXAPRO) tablet 10 mg  10 mg Oral QPM    sodium chloride (NS) flush 5-40 mL  5-40 mL IntraVENous Q8H    sodium chloride (NS) flush 5-40 mL  5-40 mL IntraVENous PRN    acetaminophen (TYLENOL) tablet 650 mg  650 mg Oral Q4H PRN    cefTRIAXone (ROCEPHIN) 1 g in sterile water (preservative free) 10 mL IV syringe  1 g IntraVENous Q24H    azithromycin (ZITHROMAX) 500 mg in 0.9% sodium chloride 250 mL (VIAL-MATE)  500 mg IntraVENous Q24H     ______________________________________________________________________  EXPECTED LENGTH OF STAY: 3d 12h  ACTUAL LENGTH OF STAY: [address] [place] [address], MD

## 2021-10-19 NOTE — PROGRESS NOTES
- patient going on hospice per hospitalist after talking to family. -report update given to [place] to call [facility] to give report and transport patient.

## 2021-10-19 NOTE — PROGRESS NOTES
Response to RRT in Room 460. No family present during this encounter. Unable to assess due to medical interventions. Provided ministry of presence & collaborated with staff. Advised nurse to contact [facility] for any further referrals. Visited by: Melissa Cruz.  Heather Banuelos, [address] [place] [address] Road paging Service [phone]

## 2021-10-19 NOTE — PROGRESS NOTES
Day #1 of IV Acyclovir  Indication:  Herpes Zoster  Current regimen:  rx to dose    Recent Labs     10/19/21  0127 10/18/21  0601 10/17/21  0123   WBC 7.8 8.4 7.2   CREA 0.86 0.84 0.75   BUN 14 12 12     Est CrCl: 38 ml/min  Temp (24hrs), Av.8 °F (37.1 °C), Min:97.7 °F (36.5 °C), Max:100.2 °F (37.9 °C)    IBW = 50.1 kg    Plan: Patient unable to take PO. Asked to convert oral acyclovir to IV. 10 mg/kg/dose q8h is usual dose.   Adjusted to q12h for crcl 25-50.  500 mg IV q12h ordered to start at 11am.

## 2021-10-19 NOTE — PROGRESS NOTES
Transitions of Care Plan:  RUR: 12%  Clinical Plan: neuro event noted this AM; conservative management; Afib w rate control; possible comfort/hospice  Consults: Cardiology; Palliative Medicine  Baseline: total care patient with 24/7 caregiver support at home  Disposition: home with previous caregiver support; BLS transport vs home hospice w caregiver support - following for patient's progress and palliative medicine update from family decision    Chart reviewed for clinical update -   Patient with neuro changes this morning; family wishes conservative management; code stroke cancelled  Patient non-responsive to verbal stimuli; able to respond to sternal rub for pain  Palliative Medicine consulted to discuss goals of care - appears hospice may be appropriate  Unable to take PO medications due to possible stroke this AM - may be switched back to IV cardizem for Afib    1400 - CM sent home hospice referral to [facility] [address] based on choice from family. CM will continue to follow.     Genice Frankel, MPH  Care Manager [facility]  Available via [employer] or

## 2021-10-19 NOTE — CALL BACK NOTE
Patient had 6 beats of ventricular tachycardia while sleeping. Patient's magnesium was normal at 1.8 yesterday. She had potassium of 3.0 at 6:01 am on 10/18/2021. She got KCl 10 meq IV x 3 doses this morning. Will recheck potassium and magnesium and replace accordingly. Patient is on AV mely blocking agent with cardizem 60 mg po qAC and qhs. Current Facility-Administered Medications:     acyclovir (ZOVIRAX) tablet 800 mg, 800 mg, Oral, 5XD, Siria Slaughter MD, 800 mg at 10/18/21 2015    metoprolol tartrate (LOPRESSOR) tablet 25 mg, 25 mg, Oral, Q6H, Sahil TORO NP, 25 mg at 10/18/21 2015    dilTIAZem IR (CARDIZEM) tablet 60 mg, 60 mg, Oral, AC&HS, Tina Mckinnon MD, 60 mg at 10/18/21 2015    metoprolol (LOPRESSOR) injection 5 mg, 5 mg, IntraVENous, Q6H PRN, Tina Mckinnon MD, 5 mg at 10/18/21 0430    albuterol-ipratropium (DUO-NEB) 2.5 MG-0.5 MG/3 ML, 3 mL, Nebulization, Q4H PRN, Malinda Gomes MD    metroNIDAZOLE (FLAGYL) IVPB premix 500 mg, 500 mg, IntraVENous, Q8H, Sean Jameson MD, Last Rate: 100 mL/hr at 10/18/21 1714, 500 mg at 10/18/21 1714    escitalopram oxalate (LEXAPRO) tablet 10 mg, 10 mg, Oral, QPM, Malinda Gomes MD, 10 mg at 10/18/21 1817    sodium chloride (NS) flush 5-40 mL, 5-40 mL, IntraVENous, Q8H, Yash Hardin MD, 10 mL at 10/18/21 2017    sodium chloride (NS) flush 5-40 mL, 5-40 mL, IntraVENous, PRN, Malinda Gomes MD    acetaminophen (TYLENOL) tablet 650 mg, 650 mg, Oral, Q4H PRN, Malinda Gomes MD, 650 mg at 10/17/21 1100    cefTRIAXone (ROCEPHIN) 1 g in sterile water (preservative free) 10 mL IV syringe, 1 g, IntraVENous, Q24H, Yash Hardin MD, 1 g at 10/18/21 1817    azithromycin (ZITHROMAX) 500 mg in 0.9% sodium chloride 250 mL (VIAL-MATE), 500 mg, IntraVENous, Q24H, Yash Hardin MD, Last Rate: 250 mL/hr at 10/17/21 2351, 500 mg at 10/17/21 2351        Ref.  Range 10/17/2021 01:23 10/18/2021 06:01   Sodium Latest Ref Range: 136 - 145 mmol/L 142 142   Potassium Latest Ref Range: 3.5 - 5.1 mmol/L 3.2 (L) 3.0 (L)   Chloride Latest Ref Range: 97 - 108 mmol/L 117 (H) 113 (H)   CO2 Latest Ref Range: 21 - 32 mmol/L 21 25   Anion gap Latest Ref Range: 5 - 15 mmol/L 4 (L) 4 (L)   Glucose Latest Ref Range: 65 - 100 mg/dL 117 (H) 101 (H)   BUN Latest Ref Range: 6 - 20 MG/DL 12 12   Creatinine Latest Ref Range: 0.55 - 1.02 MG/DL 0.75 0.84   BUN/Creatinine ratio Latest Ref Range: 12 - 20   16 14   Calcium Latest Ref Range: 8.5 - 10.1 MG/DL 7.9 (L) 8.0 (L)   Magnesium Latest Ref Range: 1.6 - 2.4 mg/dL 1.8    GFR est non-AA Latest Ref Range: >60 ml/min/1.73m2 >60 >60   GFR est AA Latest Ref Range: >60 ml/min/1.73m2 >60 >60   Bilirubin, total Latest Ref Range: 0.2 - 1.0 MG/DL 0.3    Protein, total Latest Ref Range: 6.4 - 8.2 g/dL 6.4    Albumin Latest Ref Range: 3.5 - 5.0 g/dL 2.3 (L)    Globulin Latest Ref Range: 2.0 - 4.0 g/dL 4.1 (H)    A-G Ratio Latest Ref Range: 1.1 - 2.2   0.6 (L)    ALT Latest Ref Range: 12 - 78 U/L 16    AST Latest Ref Range: 15 - 37 U/L 11 (L)    Alk.  phosphatase Latest Ref Range: 45 - 117 U/L 109    Lactic acid Latest Ref Range: 0.4 - 2.0 MMOL/L 0.6

## 2021-10-19 NOTE — PROGRESS NOTES
Charting and patient care of Ana Cristina Luke by student orientee Balbina Delphinejoaquin from 1629 to 0730 was supervised and reviewed by this RN. 1930: Bedside shift change report given to Alma Cruz RN (oncoming nurse) by SHARONDA Meeks (offgoing nurse). Report included the following information SBAR, Kardex, Intake/Output, MAR, Recent Results, Med Rec Status and Cardiac Rhythm  . 2019: 8 run of VT. Dr. Pedraza Awkward aware via perfect serve. Will cont to monitor. 2020: Weaned o2 from 3L/min to 2L/min. [id number] [phone]: Wound consult placed for shingles. 1394: matehw Diallo md notified. No orders at this time. 0630: Patient opened eye spontaneously upon entry. Kept eyes closed tight and responded with pain stimuli. Left facial droop.  0050: Rapid Response called. 8802: Dr. Roscoe Amanda paged. 0710: Bleck at bedside. Head CT, Head/Neck CT ordered verbally. 0715: Dr. Roscoe Amanda at bedside. 0233: Cancel code stroke per Dr. Roscoe Amanda and patient wishes.

## 2021-10-19 NOTE — PROGRESS NOTES
Brief:    Family chooses Hospice ; their preferred hospice company is [facility] [address] [place] [address]   [email or web address]     Case Management is advised via CC.      Fina Bond MD 10/19/2021 1:37 PM

## 2021-10-19 NOTE — PROGRESS NOTES
Cardiac Electrophysiology Hospital Progress Note     Subjective:      Amaury Willis is a 80 y.o. patient who is seen for management of AF with RVR. Interim:  Less responsive today, grimaces to sternal rub. Possible left facial droop. Code Stroke called by hospitalist.    Remains in AF, rates better controlled but still suboptimal.  Now holding po meds due to Code Stroke. K WNL this morning, normal Cr. NT pro-BNP 7630. Still diuresing, net down 505 ml. BP mildly elevated. HPI:  She presented to the ER on 10/14/2021 for hypoxia, reportedly in 80s since 10/13/2021. Noted to be in [address] [place] [address] with RVR at time of admission. Diltiazem IV gtt initiated. CXR showed bibasilar infiltrate (R>L) consistent with pneumonia. Chest CTA showed pulmonary edema pattern with moderate right & small left pleural effusions. COVID negative. Echo showed LVEF 50-55% with dilated RV, JACKIE, mild to mod MR, mild TR, & mild PH. No OAC due to history of GI bleed. Shingles noted 10/18/2021. Previous:  Previous aspiration pneumonia. GI bleed. Chronically fractured left humerus.         Problem List  Date Reviewed: 2/12/2021          Codes Class Noted    Atrial fibrillation [facility]) ICD-10-CM: I48.91  ICD-9-CM: 427.31  10/14/2021        COPD (chronic [facility] disease) ([facility] ICD-10-CM: J44.9  ICD-9-CM: 944  2/18/2021        Pneumonia ICD-10-CM: J18.9  ICD-9-CM: 486  2/10/2021        Rectal bleeding ICD-10-CM: K62.5  ICD-9-CM: 569.3  1/27/2014        Mass of pancreas ICD-10-CM: K86.89  ICD-9-CM: 577.8  1/27/2014    Overview Signed 1/27/2014  6:22 PM by Michael Medina MD     1.5 CM DENSITY POST HEAD OF PANCREAS ON CT 1/27/2014             Diverticulosis ICD-10-CM: K57.90  ICD-9-CM: 562.10  1/27/2014        GI bleed ICD-10-CM: K92.2  ICD-9-CM: 578.9  1/17/2014        Hypovitaminosis D ICD-10-CM: E55.9  ICD-9-CM: 268.9  12/17/2013        Osteoporosis ICD-10-CM: M81.0  ICD-9-CM: 733.00 12/16/2013        Low back pain ICD-10-CM: M54.50  ICD-9-CM: 724.2  12/15/2013    Overview Addendum 12/16/2013  4:09 PM by Desi Garcia MD     Subacute to chronic T12 fracture             Altered mental status ICD-10-CM: R41.82  ICD-9-CM: 780.97  9/3/2013        Alkaline phosphatase elevation ICD-10-CM: R74.8  ICD-9-CM: 790.5  9/2/2013        Hyperlipidemia ICD-10-CM: E78.5  ICD-9-CM: 272.4  6/24/2012        Intraventricular hemorrhage, nontraumatic (HCC) ICD-10-CM: I61.5  ICD-9-CM: 220  6/18/2012        HTN (hypertension) ICD-10-CM: I10  ICD-9-CM: 401.9  6/18/2012        Dementia ([address] ICD-10-CM: F03.90  ICD-9-CM: 294.20  11/10/2011        Depression ICD-10-CM: D43. A  ICD-9-CM: 791  11/10/2011        Right hip pain ICD-10-CM: M25.551  ICD-9-CM: 719.45  6/4/2011                Current Facility-Administered Medications   Medication Dose Route Frequency Provider Last Rate Last Admin    digoxin (LANOXIN) injection 125 mcg  125 mcg IntraVENous DAILY Keagan Barker MD        acyclovir (ZOVIRAX) 500 mg in 0.9% sodium chloride 100 mL IVPB  10 mg/kg (Ideal) IntraVENous Q12H Keagan Barker MD        [facility] metoprolol tartrate (LOPRESSOR) tablet 25 mg  25 mg Oral Q6H Dennis TORO NP   25 mg at 10/19/21 0202    [Held by provider] dilTIAZem IR (CARDIZEM) tablet 60 mg  60 mg Oral AC&HS Joana Villarreal MD   60 mg at 10/19/21 0630    metoprolol (LOPRESSOR) injection 5 mg  5 mg IntraVENous Q6H PRN Joana Villarreal MD   5 mg at 10/18/21 0430    albuterol-ipratropium (DUO-NEB) 2.5 MG-0.5 MG/3 ML  3 mL Nebulization Q4H PRN Js Benton MD        metroNIDAZOLE (FLAGYL) IVPB premix 500 mg  500 mg IntraVENous Q8H Keagan Barker  mL/hr at 10/19/21 0614 500 mg at 10/19/21 0614    [Held by provider] escitalopram oxalate (LEXAPRO) tablet 10 mg  10 mg Oral QPM Js Benton MD   10 mg at 10/18/21 1812    sodium chloride (NS) flush 5-40 mL  5-40 mL IntraVENous Q8H Js Benton MD   10 mL at 10/19/21 1586 sodium chloride (NS) flush 5-40 mL  5-40 mL IntraVENous PRN Toyin Mcdonnell MD        acetaminophen (TYLENOL) tablet 650 mg  650 mg Oral Q4H PRN Toyin Mcdonnell MD   650 mg at 10/17/21 1100    cefTRIAXone (ROCEPHIN) 1 g in sterile water (preservative free) 10 mL IV syringe  1 g IntraVENous Q24H Toyin Mcdonnell MD   1 g at 10/18/21 1817    azithromycin (ZITHROMAX) 500 mg in 0.9% sodium chloride 250 mL (VIAL-MATE)  500 mg IntraVENous Q24H Toyin Mcdonnell  mL/hr at 10/19/21 0117 500 mg at 10/19/21 0117     Allergies   Allergen Reactions    Codeine Hives     Pt cannot really remember allergy, uncertain of reactioni    Ibuprofen Other (comments)     Abn LFT'S & FEVER    Pcn [Penicillins] Hives     Past Medical History:   Diagnosis Date    Alcoholism ([facility] 11/10/2011    Arthritis     COPD (chronic obstructive pulmonary disease) ([facility] 2/18/2021    Dementia     Dementia ([facility] 11/10/2011    Depression 11/10/2011    Diverticulosis 1/27/2014    HTN (hypertension) 6/18/2012    Hyperlipidemia 6/24/2012    Hypertension     Hypovitaminosis D 12/17/2013    Intraventricular hemorrhage, nontraumatic ([facility] 6/18/2012    Mass of pancreas 1/27/2014    1.5 CM DENSITY POST HEAD OF PANCREAS ON CT 1/27/2014    Osteoporosis 12/16/2013    Other ill-defined conditions(799.89)     broken right shoulder    Stroke [facility])      Past Surgical History:   Procedure Laterality Date    HX APPENDECTOMY      HX CATARACT REMOVAL      HX CHOLECYSTECTOMY      HX HYSTERECTOMY      HX ORTHOPAEDIC      repair lt shoulder fx     No family history on file. Social History     Tobacco Use    Smoking status: Former Smoker     Packs/day: 1.00     Years: 50.00     Pack years: 50.00   Substance Use Topics    Alcohol use: Yes     Comment: dtrs state amount unknown        Review of Systems: Unable to obtain review of systems due to patient's unoriented status, dementia.        Objective:     Visit Vitals  BP (!) 140/61 (BP 1 Location: Right upper arm, BP Patient Position: At rest)   Pulse (!) 119   Temp 97.7 °F (36.5 °C)   Resp 24   Ht 5' 2\" (1.575 m)   Wt 135 lb 3.2 oz (61.3 kg)   SpO2 98%   BMI 24.73 kg/m²      Physical Exam:   Constitutional: Well-developed and well-nourished. No respiratory distress. Head: Normocephalic and atraumatic. Eyes: Closed. ENT: Possible left facial droop. Neck: Supple. No JVD present. Cardiovascular: Tachy rate, irregular rhythm. Exam reveals no gallop and no friction rub. No murmur heard. Pulmonary/Chest: Effort normal and breath sounds normal. No wheezes. Abdominal: Soft, no tenderness. Musculoskeletal: Symmetrical.  Vasc/lymphatic: + bilat lower extremity edema  Neurological: Minimally responsive, grimaces to sternal rub. Skin: Skin is warm and dry    EKG (10/14/2021): AF, ventricular rate 158 bpm, T wave inversion inferiorly & anterolaterally. Assessment/Plan:     Imaging/Studies:  Echo (10/16/2021): LVEF 50-55%, upper normal wall thickness. Mildly dilated RV. Mod dilated LA, severely dilated RA. Mod MAC, mild MS, mild to mod MR. Mild TR. Mild PH. CTA chest (10/14/2021): No PE. Coronary artery calcifications. Pulmonary edema pattern with mod right & small left pleural effusions. Debris/secretions in the central airways, increased risk for aspiration pneumonitis. AF with RVR: Suboptimal control on current diltiazem IR & metoprolol po, increased yesterday. Had cautioned regarding digoxin, had not ordered due to hypokalemia & hypocalcemia. K & Ca normalized today, so could now consider. Pulmonary edema: Noted via chest CTA. Persistent pleural effusions on CXR 10/17/2021. Continue diuresis with furosemide. LVEF 50-55% on echo. JETT Jolly  [facility] [place]  10/19/21    Addendum from EP attending: This patient was seen and examined by me in a face-to-face visit today. I reviewed the medical record including lab results, imaging and other provider notes.  I confirmed the history as described above. I spoke to the patient, obtained history and examined the patient. I discussed assessments and plans in details with nurse practitioner. Below are my treatment plans and recommendations. She was not responding much this morning  She has AF RVR  Exam shows      Constitutional: lethargic   Head: Normocephalic and atraumatic. Eyes: closed  Neck: Neck supple. No JVD present. Cardiovascular: fast rate, irregular rhythm and normal heart sounds. Exam reveals no gallop and no friction rub. No murmur heard. Pulmonary/Chest: Effort normal and breath sounds normal. No wheezes. Abdominal: Soft   Musculoskeletal: no tenderness. Neurological: not alert  Skin: warm and dry   Psychiatric: flat affect. Assessment and Plan:   AFIB RVR: she does not appear to be able to take PO meds today  May go back on cardizem IV  She may have a stroke and I spoke to Dr aPstora Calvo regarding head CT  Dr Pastora Calvo will talk to family about goal of care    Pulmonary edema: will continue with diuresis    Thank you for involving me in this patient's care and please call with further concerns or questions. Luis Starr M.D.   Electrophysiology/Cardiology  [facility]  [address], [address] [address] , [address]  [phone]

## 2021-10-20 PROCEDURE — 94760 N-INVAS EAR/PLS OXIMETRY 1: CPT

## 2021-10-20 PROCEDURE — 77010033678 HC OXYGEN DAILY

## 2021-10-20 RX ADMIN — Medication 10 ML: at 05:49

## 2021-10-20 NOTE — DISCHARGE SUMMARY
Discharge Summary       PATIENT ID: Peter Velasquez  MRN: [id number]   YOB: 1931    DATE OF ADMISSION: 10/14/2021 10:43 AM    DATE OF DISCHARGE: 10/20/2021    PRIMARY CARE PROVIDER: Mariia Aguilar MD     ATTENDING PHYSICIAN: Griselda Lewis MD   DISCHARGING PROVIDER: Griselda Lewis MD    To contact this individual call [phone] and ask the  to page. If unavailable ask to be transferred the Adult Hospitalist Department. CONSULTATIONS: IP CONSULT TO HOSPITALIST  IP CONSULT TO CARDIOLOGY    PROCEDURES/SURGERIES: * No surgery found *    ADMITTING [facility] COURSE:   Per notes:    Brief:     Family chooses Hospice ; their preferred hospice company is [facility]   [email or web address]      Case Management is advised via Gayathri White MD 10/19/2021 1:37 PM             Admission Summary:   Copied form admit: \" Camillia Milan a 80 y. o. female who presents with shortness of breath     Patient presented to the hospital with hypoxia, apparently patient has home health and they noticed that the patient's oxygen saturation has been running low, it was running in the 80s, patient was brought to the hospital. Ginny Adair is a poor historian, has history of dementia, so not much history could be obtained from the patient, patient is complaining of some abdominal pain, when patient arrived to the hospital was found to be in A. fib with RVR, was requested to be admitted to the hospitalist service. Ginny Adair currently appears somewhat uncomfortable.  I tried calling patient's daughter Kemi Salter, and left a voicemail \"     Interval history / Subjective:      10/19/2021 :   · Cont tachy  · Low K addressed  · Had changed neuro status, discussed w family, Dr. Lisa Ingram, Dr. Cindy South: -MD's agree w this Mertie Pontiff as to conservative management   · Conservative management indicated  · CT  head for better understanding; not code stroke as no intervention planned         Assessment & Plan:      A. fib with RVR- diltz drip, po replacement as feel pt can take;   - Lluvia Beans iv dig  10/17 and again   - iv dig 10/18/2021 ; dilt   additional oral dose as well   - 10/19/2021 : tachy, now pt not verbal/not following,  Case discussed w family; likely stoke,   Iv for control til other wise as in Hospice care/comfort care  - discharged home w comfort measures/hospice care forward.      Aucte CVA 10/19/2021     · Had changed neuro status, discussed w family, Dr. Yohannes Walker, Dr. Beckie Koenig: -MD's agree w this writer for  conservative management. · Conservative management indicated CT  head for better understanding; not code stroke as no intervention planned   - CT neg for acute cva but would need MRI to rule, out, empirically had cva, acute;and additionally severe cerebral microvascular changes and  atrophy noted ruthie frontal lobe      Acute hypoxic respiratory failure - 02 /nebs/monitor   - no acute resp distress on exam - to hospice      Community-acquired pneumonia- aspiration:  CT 10/14:  1.  No pulmonary embolus. 2.  Pulmonary edema pattern with moderate right and small left pleural  effusions. 3.  Debris/secretions in the central airways, increased risk for aspiration  Pneumonitis.   10/18/2021 :  · Tachy to 140's dig given  · Shingles; on antivirals as of last pm  · Cont on dysphagia diet, discussed with daughter, decision is no MBS: as if test poorly, no decision to not feed and no plans for feeding tube;   · Palliative med to weigh in   · DNR continues, portable copy DDNR on chart   · - hospice dispo 10/20/2021      Hypertension  Adjust meds as needed      BP Readings from Last 1 Encounters:   10/19/21 (!) 140/61         Shingles: 10/17:   - acyclovir started -change to iv    -comfort measures forward     CoDE status; DNR      Hypokalemia: replacing      Hyperlipidemia     Dysphagia: speech eval  - done, puree/honey thickness liquid             DISCHARGE DIAGNOSES / PLAN:      1.  as above      ADDITIONAL CARE RECOMMENDATIONS:   na     PENDING TEST RESULTS:   At the time of discharge the following test results are still pending: na    FOLLOW UP APPOINTMENTS:    Follow-up Information     Follow up With Specialties Details Why Contact Info    Clary Ellington MD Internal Medicine   [address] [facility] [address]  [phone]           Hospice rec's forward       DISCHARGE MEDICATIONS:  Current Discharge Medication List      CONTINUE these medications which have NOT CHANGED    Details   albuterol-ipratropium (DUO-NEB) 2.5 mg-0.5 mg/3 ml nebu 3 mL by Nebulization route every four (4) hours as needed. !! albuterol (PROVENTIL VENTOLIN) 2.5 mg /3 mL (0.083 %) nebu USE 1 VIAL VIA NEBULIZER EVERY 4 HOURS AS NEEDED FOR WHEEZING  Qty: 75 mL, Refills: 11      !! albuterol (PROVENTIL VENTOLIN) 2.5 mg /3 mL (0.083 %) nebu 3 mL by Nebulization route four (4) times daily. Qty: 30 Nebule, Refills: 11      albuterol (Ventolin HFA) 90 mcg/actuation inhaler INHALE 2 PUFFS EVERY 4 HOURS AS NEEDED FOR WHEEZING  Qty: 1 Inhaler, Refills: 11      inhalational spacing device 1 Each by Does Not Apply route as needed (wheezing). Qty: 1 Device, Refills: 0      acetaminophen (TYLENOL) 325 mg tablet Take  by mouth every four (4) hours as needed for Pain. nystatin (MYCOSTATIN) 100,000 unit/mL suspension Take 5 mL by mouth four (4) times daily. Dab her mouth with one teaspoonful of Nystatin 4 times daily  Qty: 200 mL, Refills: 0      !! nystatin (Nyamyc) powder APPLY TO AFFECTED AREA 4 TIMES A DAY. Qty: 60 g, Refills: 11      !! nystatin (MYCOSTATIN) powder Apply  to affected area four (4) times daily. Qty: 60 g, Refills: 11       !! - Potential duplicate medications found. Please discuss with provider.       STOP taking these medications       budesonide (PULMICORT) 0.5 mg/2 mL nbsp Comments:   Reason for Stopping:         predniSONE (DELTASONE) 5 mg tablet Comments: Reason for Stopping:         escitalopram oxalate (LEXAPRO) 10 mg tablet Comments:   Reason for Stopping:         amLODIPine (NORVASC) 5 mg tablet Comments:   Reason for Stopping:         Qnkpeta-Hxempwlotex-DK-Acetam (NYQUIL D) 6.25-30- mg/15 mL liqd Comments:   Reason for Stopping:         cholecalciferol, vitamin D3, (VITAMIN D3) 2,000 unit tab Comments:   Reason for Stopping:                 NOTIFY YOUR PHYSICIAN FOR ANY OF THE FOLLOWING:   Fever over 101 degrees for 24 hours. Chest pain, shortness of breath, fever, chills, nausea, vomiting, diarrhea, change in mentation, falling, weakness, bleeding. Severe pain or pain not relieved by medications. Or, any other signs or symptoms that you may have questions about.     DISPOSITION:    Home With:   OT  PT  HH  RN       Long term SNF/Inpatient Rehab    Independent/assisted living   x Hospice    Other:       PATIENT CONDITION AT DISCHARGE:     Functional status   x Poor     Deconditioned     Independent      Cognition     Lucid     Forgetful    x Dementia      Catheters/lines (plus indication)    Harmon     PICC     PEG    x None      Code status     Full code    x DNR      PHYSICAL EXAMINATION AT DISCHARGE:  Visit Vitals  BP (!) 140/71 (BP 1 Location: Left arm, BP Patient Position: At rest)   Pulse 95   Temp 97.6 °F (36.4 °C)   Resp 16   Ht 5' 2\" (1.575 m)   Wt 61.3 kg (135 lb 3.2 oz)   SpO2 99%   BMI 24.73 kg/m²        CHRONIC MEDICAL DIAGNOSES:  Problem List as of 10/20/2021 Date Reviewed: 10/20/2021        Codes Class Noted - Resolved    Atrial fibrillation ([facility] ICD-10-CM: I48.91  ICD-9-CM: 427.31  10/14/2021 - Present        COPD (chronic obstructive pulmonary disease) ([facility] ICD-10-CM: J44.9  ICD-9-CM: 463  2/18/2021 - Present        Pneumonia ICD-10-CM: J18.9  ICD-9-CM: 486  2/10/2021 - Present        Rectal bleeding ICD-10-CM: K62.5  ICD-9-CM: 569.3  1/27/2014 - Present        Mass of pancreas ICD-10-CM: K86.89  ICD-9-CM: 577.8  1/27/2014 - Present Overview Signed 1/27/2014  6:22 PM by Emelyn Ornelas MD     1.5 CM DENSITY POST HEAD OF PANCREAS ON CT 1/27/2014             Diverticulosis ICD-10-CM: K57.90  ICD-9-CM: 562.10  1/27/2014 - Present        GI bleed ICD-10-CM: K92.2  ICD-9-CM: 578.9  1/17/2014 - Present        Hypovitaminosis D ICD-10-CM: E55.9  ICD-9-CM: 268.9  12/17/2013 - Present        Osteoporosis ICD-10-CM: M81.0  ICD-9-CM: 733.00  12/16/2013 - Present        Low back pain ICD-10-CM: M54.50  ICD-9-CM: 724.2  12/15/2013 - Present    Overview Addendum 12/16/2013  4:09 PM by Emelyn Ornelas MD     Subacute to chronic T12 fracture             Altered mental status ICD-10-CM: R41.82  ICD-9-CM: 780.97  9/3/2013 - Present        Alkaline phosphatase elevation ICD-10-CM: R74.8  ICD-9-CM: 790.5  9/2/2013 - Present        Hyperlipidemia ICD-10-CM: E78.5  ICD-9-CM: 272.4  6/24/2012 - Present        Intraventricular hemorrhage, nontraumatic (HCC) ICD-10-CM: I61.5  ICD-9-CM: 395  6/18/2012 - Present        HTN (hypertension) ICD-10-CM: I10  ICD-9-CM: 401.9  6/18/2012 - Present        Dementia ([facility] ICD-10-CM: F03.90  ICD-9-CM: 294.20  11/10/2011 - Present        Depression ICD-10-CM: F32. A  ICD-9-CM: 036  11/10/2011 - Present        Right hip pain ICD-10-CM: M25.551  ICD-9-CM: 719.45  6/4/2011 - Present        RESOLVED: Hypokalemia ICD-10-CM: E87.6  ICD-9-CM: 276.8  6/18/2012 - 6/19/2012        RESOLVED: Alcoholism ([facility] ICD-10-CM: G71.67  ICD-9-CM: 303.90  11/10/2011 - 4/20/2020              Greater than 20   minutes were spent with the patient on counseling and coordination of care    Signed:   Sammy Shepard MD  10/20/2021  9:18 AM

## 2021-10-20 NOTE — PROGRESS NOTES
Transitions of Care Plan:  RUR: 11%  Clinical Plan: Discharge to hospice today  Consults: None  Baseline: dementia; total care patient with 24/7 caregiver support at home  Disposition: [facility] with At [address]; BLS transport with oxygen    CM spoke with At [facility] - confirmed patient accepted and DME set up this morning. Hospice to admit patient into their service today around 12PM.    CM requested 10:30AM  time with [facility]. BLS stretcher transport required with 3LPM of supplemental oxygen in route. CM spoke with patient's daughter, Irena Fritz, and confirmed today's plan for discharge. No concerns. Medicare pt has received, reviewed, and signed 2nd IM letter informing them of their right to appeal the discharge. Signed copied has been placed on pt bedside chart.     Yehuda Arreguin, MPH  Care Manager [facility]  Available via [address] [place] [address] or

## 2021-10-20 NOTE — PROGRESS NOTES
1930: Bedside shift change report given to Aisha Ramirez RN (oncoming nurse) by Marycarmen Islas RN (offgoing nurse). Report included the following information SBAR, Kardex, Intake/Output, MAR, Recent Results, Med Rec Status and Cardiac Rhythm  . 2020: Attempted to call report. 2030: Eyes opened spontaneously, correctly stated name, Requesting to go home. 2100: Spoke with Daisy Lincoln. Explained hospice and patients current status. [phone].   2140Geoffnat Giraldo, daughter at bedside. Understands patients state of health and the need for hospice at this time. 2205: TRANSFER - OUT REPORT:  Verbal report given to Marycarmen Islas RN(name) on Aggie Kraus transferred to [address] (unit) for change in patient condition(Hospice Care) Report consisted of patients Situation, Background, Assessment and   Recommendations(SBAR). Information from the following report(s) SBAR, Kardex, Intake/Output, MAR, Recent Results, Med Rec Status, Cardiac Rhythm afib rvr and Quality Measures was reviewed with the receiving nurse.     Lines:   Peripheral IV 10/18/21 Left;Posterior Wrist (Active)   Site Assessment Clean, dry, & intact 10/19/21 1005   Phlebitis Assessment 0 10/19/21 1005   Infiltration Assessment 0 10/19/21 1005   Dressing Status Clean, dry, & intact 10/19/21 1005   Dressing Type Transparent;Tape 10/19/21 1005   Hub Color/Line Status Blue;Capped 10/19/21 1005   Action Taken Open ports on tubing capped 10/19/21 1005   Alcohol Cap Used Yes 10/19/21 1005       Peripheral IV 10/19/21 Right Antecubital (Active)   Site Assessment Clean, dry, & intact 10/19/21 1005   Phlebitis Assessment 0 10/19/21 1005   Infiltration Assessment 0 10/19/21 1005   Dressing Status Clean, dry, & intact 10/19/21 1005   Dressing Type Transparent;Tape 10/19/21 1005   Hub Color/Line Status Pink;Capped 10/19/21 1005   Action Taken Open ports on tubing capped 10/19/21 1005   Alcohol Cap Used Yes 10/19/21 1005        Opportunity for questions and clarification was provided.     Patient transported with:   O2 @ 3 liters  Tech

## 2021-10-21 ENCOUNTER — PATIENT OUTREACH (OUTPATIENT)
Dept: CASE MANAGEMENT | Age: 86
End: 2021-10-21

## 2021-10-21 NOTE — PROGRESS NOTES
No transition of care outreach indicated due to patient discharge to hospice care. Verified that pt has [facility] in place.

## 2021-10-23 LAB
BACTERIA SPEC CULT: NORMAL
SERVICE CMNT-IMP: NORMAL

## 2021-11-23 ENCOUNTER — PATIENT OUTREACH (OUTPATIENT)
Dept: CASE MANAGEMENT | Age: 86
End: 2021-11-23

## 2021-11-23 NOTE — PROGRESS NOTES
No transition of care outreach indicated due to patient discharge to hospice care. Confirmed with [facility] [address] that pt remains admitted to that service. Episode of care resolved.